# Patient Record
Sex: FEMALE | Race: WHITE | NOT HISPANIC OR LATINO | Employment: FULL TIME | URBAN - METROPOLITAN AREA
[De-identification: names, ages, dates, MRNs, and addresses within clinical notes are randomized per-mention and may not be internally consistent; named-entity substitution may affect disease eponyms.]

---

## 2018-08-08 ENCOUNTER — APPOINTMENT (OUTPATIENT)
Dept: CT IMAGING | Facility: HOSPITAL | Age: 28
End: 2018-08-08
Attending: EMERGENCY MEDICINE

## 2018-08-08 ENCOUNTER — HOSPITAL ENCOUNTER (EMERGENCY)
Facility: HOSPITAL | Age: 28
Discharge: HOME | End: 2018-08-08
Attending: EMERGENCY MEDICINE | Admitting: EMERGENCY MEDICINE

## 2018-08-08 ENCOUNTER — APPOINTMENT (OUTPATIENT)
Dept: GENERAL RADIOLOGY | Facility: HOSPITAL | Age: 28
End: 2018-08-08

## 2018-08-08 VITALS
RESPIRATION RATE: 18 BRPM | SYSTOLIC BLOOD PRESSURE: 118 MMHG | OXYGEN SATURATION: 99 % | HEIGHT: 65 IN | WEIGHT: 175 LBS | HEART RATE: 56 BPM | DIASTOLIC BLOOD PRESSURE: 74 MMHG | TEMPERATURE: 98.9 F | BODY MASS INDEX: 29.16 KG/M2

## 2018-08-08 DIAGNOSIS — K63.89 EPIPLOIC APPENDAGITIS: Primary | ICD-10-CM

## 2018-08-08 LAB
ALBUMIN SERPL-MCNC: 4.6 G/DL (ref 3.5–5.2)
ALBUMIN/GLOB SERPL: 1.4 G/DL
ALP SERPL-CCNC: 58 U/L (ref 40–129)
ALT SERPL W P-5'-P-CCNC: 22 U/L (ref 5–33)
ANION GAP SERPL CALCULATED.3IONS-SCNC: 13.7 MMOL/L
AST SERPL-CCNC: 23 U/L (ref 5–32)
BACTERIA UR QL AUTO: ABNORMAL /HPF
BASOPHILS # BLD AUTO: 0.03 10*3/MM3 (ref 0–0.2)
BASOPHILS NFR BLD AUTO: 0.5 % (ref 0–2)
BILIRUB SERPL-MCNC: 0.8 MG/DL (ref 0.2–1.2)
BILIRUB UR QL STRIP: NEGATIVE
BUN BLD-MCNC: 12 MG/DL (ref 6–20)
BUN/CREAT SERPL: 16.2 (ref 7–25)
CALCIUM SPEC-SCNC: 9.3 MG/DL (ref 8.6–10.5)
CHLORIDE SERPL-SCNC: 102 MMOL/L (ref 98–107)
CLARITY UR: CLEAR
CO2 SERPL-SCNC: 24.3 MMOL/L (ref 22–29)
COLOR UR: YELLOW
CREAT BLD-MCNC: 0.74 MG/DL (ref 0.57–1)
DEPRECATED RDW RBC AUTO: 40.9 FL (ref 37–54)
EOSINOPHIL # BLD AUTO: 0.13 10*3/MM3 (ref 0.1–0.3)
EOSINOPHIL NFR BLD AUTO: 2.2 % (ref 0–4)
ERYTHROCYTE [DISTWIDTH] IN BLOOD BY AUTOMATED COUNT: 11.9 % (ref 11.5–14.5)
GFR SERPL CREATININE-BSD FRML MDRD: 95 ML/MIN/1.73
GLOBULIN UR ELPH-MCNC: 3.3 GM/DL
GLUCOSE BLD-MCNC: 91 MG/DL (ref 65–99)
GLUCOSE UR STRIP-MCNC: NEGATIVE MG/DL
HCG SERPL QL: NEGATIVE
HCT VFR BLD AUTO: 40.5 % (ref 37–47)
HGB BLD-MCNC: 13.7 G/DL (ref 12–16)
HGB UR QL STRIP.AUTO: NEGATIVE
HYALINE CASTS UR QL AUTO: ABNORMAL /LPF
IMM GRANULOCYTES # BLD: 0.01 10*3/MM3 (ref 0–0.03)
IMM GRANULOCYTES NFR BLD: 0.2 % (ref 0–0.5)
KETONES UR QL STRIP: NEGATIVE
LEUKOCYTE ESTERASE UR QL STRIP.AUTO: ABNORMAL
LIPASE SERPL-CCNC: 25 U/L (ref 13–60)
LYMPHOCYTES # BLD AUTO: 1.56 10*3/MM3 (ref 0.6–4.8)
LYMPHOCYTES NFR BLD AUTO: 25.8 % (ref 20–45)
MCH RBC QN AUTO: 31.5 PG (ref 27–31)
MCHC RBC AUTO-ENTMCNC: 33.8 G/DL (ref 31–37)
MCV RBC AUTO: 93.1 FL (ref 81–99)
MONOCYTES # BLD AUTO: 0.68 10*3/MM3 (ref 0–1)
MONOCYTES NFR BLD AUTO: 11.3 % (ref 3–8)
NEUTROPHILS # BLD AUTO: 3.63 10*3/MM3 (ref 1.5–8.3)
NEUTROPHILS NFR BLD AUTO: 60 % (ref 45–70)
NITRITE UR QL STRIP: NEGATIVE
NRBC BLD MANUAL-RTO: 0 /100 WBC (ref 0–0)
PH UR STRIP.AUTO: 7 [PH] (ref 4.5–8)
PLATELET # BLD AUTO: 268 10*3/MM3 (ref 140–500)
PMV BLD AUTO: 10.6 FL (ref 7.4–10.4)
POTASSIUM BLD-SCNC: 3.8 MMOL/L (ref 3.5–5.2)
PROT SERPL-MCNC: 7.9 G/DL (ref 6–8.5)
PROT UR QL STRIP: NEGATIVE
RBC # BLD AUTO: 4.35 10*6/MM3 (ref 4.2–5.4)
RBC # UR: ABNORMAL /HPF
REF LAB TEST METHOD: ABNORMAL
SODIUM BLD-SCNC: 140 MMOL/L (ref 136–145)
SP GR UR STRIP: 1.01 (ref 1–1.03)
SQUAMOUS #/AREA URNS HPF: ABNORMAL /HPF
UROBILINOGEN UR QL STRIP: ABNORMAL
WBC NRBC COR # BLD: 6.04 10*3/MM3 (ref 4.8–10.8)
WBC UR QL AUTO: ABNORMAL /HPF

## 2018-08-08 PROCEDURE — 96374 THER/PROPH/DIAG INJ IV PUSH: CPT

## 2018-08-08 PROCEDURE — 0 IOPAMIDOL PER 1 ML: Performed by: EMERGENCY MEDICINE

## 2018-08-08 PROCEDURE — 71046 X-RAY EXAM CHEST 2 VIEWS: CPT

## 2018-08-08 PROCEDURE — 85025 COMPLETE CBC W/AUTO DIFF WBC: CPT | Performed by: EMERGENCY MEDICINE

## 2018-08-08 PROCEDURE — 74177 CT ABD & PELVIS W/CONTRAST: CPT

## 2018-08-08 PROCEDURE — 84703 CHORIONIC GONADOTROPIN ASSAY: CPT | Performed by: EMERGENCY MEDICINE

## 2018-08-08 PROCEDURE — 80053 COMPREHEN METABOLIC PANEL: CPT | Performed by: EMERGENCY MEDICINE

## 2018-08-08 PROCEDURE — 25010000002 KETOROLAC TROMETHAMINE PER 15 MG: Performed by: EMERGENCY MEDICINE

## 2018-08-08 PROCEDURE — 99284 EMERGENCY DEPT VISIT MOD MDM: CPT | Performed by: EMERGENCY MEDICINE

## 2018-08-08 PROCEDURE — 99283 EMERGENCY DEPT VISIT LOW MDM: CPT

## 2018-08-08 PROCEDURE — 83690 ASSAY OF LIPASE: CPT | Performed by: EMERGENCY MEDICINE

## 2018-08-08 PROCEDURE — 81001 URINALYSIS AUTO W/SCOPE: CPT | Performed by: EMERGENCY MEDICINE

## 2018-08-08 RX ORDER — KETOROLAC TROMETHAMINE 30 MG/ML
30 INJECTION, SOLUTION INTRAMUSCULAR; INTRAVENOUS ONCE
Status: COMPLETED | OUTPATIENT
Start: 2018-08-08 | End: 2018-08-08

## 2018-08-08 RX ORDER — PHENDIMETRAZINE TARTRATE 35 MG/1
TABLET ORAL
COMMUNITY
End: 2019-10-15 | Stop reason: ALTCHOICE

## 2018-08-08 RX ORDER — PHENTERMINE HYDROCHLORIDE 30 MG/1
30 CAPSULE ORAL EVERY MORNING
COMMUNITY
End: 2021-03-11

## 2018-08-08 RX ORDER — IBUPROFEN 800 MG/1
800 TABLET ORAL
Qty: 30 TABLET | Refills: 0 | Status: SHIPPED | OUTPATIENT
Start: 2018-08-08 | End: 2018-08-18

## 2018-08-08 RX ORDER — SODIUM CHLORIDE 0.9 % (FLUSH) 0.9 %
10 SYRINGE (ML) INJECTION AS NEEDED
Status: DISCONTINUED | OUTPATIENT
Start: 2018-08-08 | End: 2018-08-08 | Stop reason: HOSPADM

## 2018-08-08 RX ADMIN — KETOROLAC TROMETHAMINE 30 MG: 30 INJECTION, SOLUTION INTRAMUSCULAR at 10:33

## 2018-08-08 RX ADMIN — IOPAMIDOL 100 ML: 755 INJECTION, SOLUTION INTRAVENOUS at 13:43

## 2018-08-08 NOTE — ED PROVIDER NOTES
Subjective   History of Present Illness  History of Present Illness    Chief complaint: Right side pain     Location: Right lateral costal margin    Quality/Severity:  Described as sharp and moderate    Timing/Duration: Insidious onset approximately 24 hours ago    Modifying Factors: Certain movements and deep breathing causes pain    Associated Symptoms: None    Narrative: The patient is a normally healthy, 27-year-old, white female who presents as noted above.    Review of Systems   Constitutional: Negative for activity change, appetite change, fatigue and fever.   HENT: Negative for congestion.    Respiratory: Negative for cough, shortness of breath and wheezing.    Cardiovascular: Positive for chest pain. Negative for palpitations and leg swelling.   Gastrointestinal: Positive for abdominal pain (Right upper quadrant). Negative for diarrhea, nausea and vomiting.   Endocrine: Negative for polydipsia.   Genitourinary: Negative for difficulty urinating, dysuria, flank pain, frequency and urgency.   Musculoskeletal: Negative for back pain.   Skin: Negative for rash.   Neurological: Negative for dizziness, weakness and headaches.   Psychiatric/Behavioral: Negative for confusion.   All other systems reviewed and are negative.      History reviewed. No pertinent past medical history.    No Known Allergies    Past Surgical History:   Procedure Laterality Date   • THYROID SURGERY         History reviewed. No pertinent family history.    Social History     Social History   • Marital status: Unknown     Social History Main Topics   • Smoking status: Never Smoker   • Alcohol use Yes      Comment: occasionally   • Drug use: Unknown     Other Topics Concern   • Not on file           Objective   Physical Exam   Constitutional: She is oriented to person, place, and time. She appears well-developed and well-nourished.   HENT:   Head: Normocephalic and atraumatic.   Mouth/Throat: Oropharynx is clear and moist.   Eyes: Pupils are  equal, round, and reactive to light. Conjunctivae and EOM are normal.   Neck: Normal range of motion. Neck supple. No thyromegaly present.   Cardiovascular: Normal rate, regular rhythm and normal heart sounds.    No murmur heard.  Pulmonary/Chest: Effort normal and breath sounds normal. No respiratory distress. She has no wheezes. She has no rales. She exhibits tenderness (palpation of the right anterolateral costal margin with mild tenderness.).   Abdominal: Soft. Bowel sounds are normal. She exhibits no distension. There is tenderness (right upper quadrant).   Musculoskeletal: Normal range of motion. She exhibits no edema or tenderness.   Lymphadenopathy:     She has no cervical adenopathy.   Neurological: She is alert and oriented to person, place, and time.   Skin: Skin is warm and dry. No rash noted.   Psychiatric: She has a normal mood and affect. Her behavior is normal.   Nursing note and vitals reviewed.      Procedures  Xr Chest 2 View    Result Date: 8/8/2018  XR CHEST 2 VW-: 8/8/2018 11:45 AM  INDICATION:  Right-sided chest pain for 2 days  COMPARISON:  None available.  FINDINGS: PA and lateral views of the chest.  Heart and mediastinal contours are normal.  The lungs are clear.  No pneumothorax or pleural effusion.      Normal chest radiograph.  This report was finalized on 8/8/2018 12:16 PM by Dr. Arnol Swenson MD.      Ct Abdomen Pelvis With Contrast    Result Date: 8/8/2018  CT ABDOMEN PELVIS W CONTRAST-: 8/8/2018 1:35 PM  INDICATION: Right lower chest/abdominal pain.  TECHNIQUE: CT of the abdomen and pelvis with contrast. Coronal and sagittal reconstructions were obtained.  Radiation dose reduction techniques included automated exposure control or exposure modulation based on body size. Radiation audit for number of CT and nuclear cardiology exams performed in the last year: 0.   COMPARISON: None available.  FINDINGS: ABDOMEN: There is a focal area of inflammation surrounding some fat adjacent to the  hepatic flexure of the colon. I suspect this represents an epiploic appendicitis. The adjacent liver and: Do not show any significant pathologic.  The pancreas, spleen, and adrenal glands are normal. There is benign cyst off the superior pole left kidney.. The gallbladder is not distended.  The bowel is not dilated.  The appendix is normal.       PELVIS: There is a small volume of free fluid in the pelvis which is likely physiologic. The uterus and ovaries are within normal limits. The patient has an IUD.  No enlarged pelvic or inguinal lymph nodes.  No acute osseous abnormalities.      Small area of inflammation within the fat adjacent to the hepatic flexure of the colon.  This is likely secondary to an epiploic appendicitis. No complicating features are identified.     This report was finalized on 8/8/2018 1:50 PM by Dr. Arnol Swenson MD.           Final diagnoses:   Epiploic appendagitis         ED Course  ED Course as of Aug 08 1413   Wed Aug 08, 2018   1241 All pertinent findings discussed with patient as were the diagnosis of chest wall pain, treatment plan, expectations and warnings.  [ML]   1311 Patient was initially discharged after a full discussion of her current presentation.  However the patient seemed convinced that she was having a more significant problem and possibly an appendicitis.  The patient went so far as to call her mother who proceeded to explain to me that she had an atypical appendicitis at age 24.  Although based on current findings and workup it was felt that there was low risk of any significant thoracic or abdominal pathology.  However, because of the patient's extreme anxiety over her current pain a CT abdomen/pelvis will be obtained.  [ML]   1408 Final report on the CT abdomen/pelvis did show an area of inflammation in the hepatic flexure consistent with epiploic appendage orifice.  This diagnosis was discussed in detail with the patient along with the treatment plan, expectations  and warnings.  [ML]      ED Course User Index  [ML] Franklyn eBllo MD                  MDM  Number of Diagnoses or Management Options  Epiploic appendagitis: new and does not require workup     Amount and/or Complexity of Data Reviewed  Clinical lab tests: ordered and reviewed  Tests in the radiology section of CPT®: ordered and reviewed  Independent visualization of images, tracings, or specimens: yes    Risk of Complications, Morbidity, and/or Mortality  Presenting problems: high  Diagnostic procedures: high  Management options: moderate    Patient Progress  Patient progress: stable  My differential diagnosis for abdominal pain includes but is not limited to:  Gastritis, gastroenteritis, peptic ulcer disease, GERD, esophageal perforation, acute appendicitis, mesenteric adenitis, Meckel’s diverticulum, epiploic appendagitis, diverticulitis, colon cancer, ulcerative colitis, Crohn’s disease, intussusception, small bowel obstruction, adhesions, ischemic bowel, perforated viscus, ileus, obstipation, biliary colic, cholecystitis, cholelithiasis, Jerrod-Aneudy Get, hepatitis, pancreatitis, common bile duct obstruction, cholangitis, bile leak, splenic trauma, splenic rupture, splenic infarction, splenic abscess, abdominal abscess, ascites, spontaneous bacterial peritonitis, hernia, UTI, cystitis, prostatitis, ureterolithiasis, urinary obstruction, ovarian cyst, torsion, pregnancy, ectopic pregnancy, PID, pelvic abscess, mittelschmerz, endometriosis, AAA, myocardial infarction, pneumonia, cancer, porphyria, DKA, medications, sickle cell, viral syndrome, zoster    Labs this visit  Lab Results (last 24 hours)     Procedure Component Value Units Date/Time    Urinalysis With Microscopic If Indicated (No Culture) - Urine, Clean Catch [[id number]]  (Abnormal) Collected:  08/08/18 1027    Specimen:  Urine from Urine, Clean Catch Updated:  08/08/18 1057     Color, UA Yellow     Appearance, UA Clear     pH, UA 7.0      Specific Gravity, UA 1.015     Glucose, UA Negative     Ketones, UA Negative     Bilirubin, UA Negative     Blood, UA Negative     Protein, UA Negative     Leuk Esterase, UA Trace (A)     Nitrite, UA Negative     Urobilinogen, UA 0.2 E.U./dL    Urinalysis, Microscopic Only - Urine, Clean Catch [[id number]]  (Abnormal) Collected:  08/08/18 1027    Specimen:  Urine from Urine, Clean Catch Updated:  08/08/18 1106     RBC, UA None Seen /HPF      WBC, UA 3-5 (A) /HPF      Bacteria, UA None Seen /HPF      Squamous Epithelial Cells, UA 0-2 /HPF      Hyaline Casts, UA None Seen /LPF      Methodology Manual Light Microscopy    CBC & Differential [[id number]] Collected:  08/08/18 1032    Specimen:  Blood Updated:  08/08/18 1049    Narrative:       The following orders were created for panel order CBC & Differential.  Procedure                               Abnormality         Status                     ---------                               -----------         ------                     CBC Auto Differential[[id number]]        Abnormal            Final result                 Please view results for these tests on the individual orders.    Comprehensive Metabolic Panel [[id number]] Collected:  08/08/18 1032    Specimen:  Blood Updated:  08/08/18 1100     Glucose 91 mg/dL      BUN 12 mg/dL      Creatinine 0.74 mg/dL      Sodium 140 mmol/L      Potassium 3.8 mmol/L      Chloride 102 mmol/L      CO2 24.3 mmol/L      Calcium 9.3 mg/dL      Total Protein 7.9 g/dL      Albumin 4.60 g/dL      ALT (SGPT) 22 U/L      AST (SGOT) 23 U/L      Alkaline Phosphatase 58 U/L      Total Bilirubin 0.8 mg/dL      eGFR Non African Amer 95 mL/min/1.73      Globulin 3.3 gm/dL      A/G Ratio 1.4 g/dL      BUN/Creatinine Ratio 16.2     Anion Gap 13.7 mmol/L     Lipase [[id number]]  (Normal) Collected:  08/08/18 1032    Specimen:  Blood Updated:  08/08/18 1100     Lipase 25 U/L     hCG, Serum, Qualitative [[id number]]  (Normal) Collected:  08/08/18 1032     Specimen:  Blood Updated:  08/08/18 1055     HCG Qualitative Negative    CBC Auto Differential [[id number]]  (Abnormal) Collected:  08/08/18 1032    Specimen:  Blood Updated:  08/08/18 1049     WBC 6.04 10*3/mm3      RBC 4.35 10*6/mm3      Hemoglobin 13.7 g/dL      Hematocrit 40.5 %      MCV 93.1 fL      MCH 31.5 (H) pg      MCHC 33.8 g/dL      RDW 11.9 %      RDW-SD 40.9 fl      MPV 10.6 (H) fL      Platelets 268 10*3/mm3      Neutrophil % 60.0 %      Lymphocyte % 25.8 %      Monocyte % 11.3 (H) %      Eosinophil % 2.2 %      Basophil % 0.5 %      Immature Grans % 0.2 %      Neutrophils, Absolute 3.63 10*3/mm3      Lymphocytes, Absolute 1.56 10*3/mm3      Monocytes, Absolute 0.68 10*3/mm3      Eosinophils, Absolute 0.13 10*3/mm3      Basophils, Absolute 0.03 10*3/mm3      Immature Grans, Absolute 0.01 10*3/mm3      nRBC 0.0 /100 WBC         Prescribed on discharge             Medication List      New Prescriptions    ibuprofen 800 MG tablet  Commonly known as:  ADVIL,MOTRIN  Take 1 tablet by mouth 3 (Three) Times a Day With Meals for 30 doses.          All lab results, imaging results and other tests were reviewed by Franklyn Bello MD and unless otherwise specified were found to be unremarkable.        Final diagnoses:   Epiploic appendagitis            Franklyn Bello MD  08/08/18 [id number]

## 2018-08-08 NOTE — DISCHARGE INSTRUCTIONS
Return to the emergency department for increasing pain, fever, persistent vomiting, profuse diarrhea or any bleeding.  Diet and activity as tolerated.

## 2018-08-08 NOTE — ED NOTES
Upon discharging pt, she started crying and said she was not happy that she was being discharged. Spoke with pt for about 15mins about this and told her I will have the doctor come talk to her again. Pt was thankful for that.      Kevin Win, SERVANDO  08/08/18 [id number]

## 2020-02-26 ENCOUNTER — OFFICE VISIT (OUTPATIENT)
Dept: RETAIL CLINIC | Facility: CLINIC | Age: 30
End: 2020-02-26

## 2020-02-26 VITALS
HEART RATE: 78 BPM | TEMPERATURE: 98.5 F | DIASTOLIC BLOOD PRESSURE: 82 MMHG | OXYGEN SATURATION: 98 % | SYSTOLIC BLOOD PRESSURE: 132 MMHG | RESPIRATION RATE: 18 BRPM

## 2020-02-26 DIAGNOSIS — J34.89 TENDERNESS OVER MAXILLARY SINUS: ICD-10-CM

## 2020-02-26 DIAGNOSIS — G44.209 TENSION HEADACHE: Primary | ICD-10-CM

## 2020-02-26 PROCEDURE — 99213 OFFICE O/P EST LOW 20 MIN: CPT | Performed by: NURSE PRACTITIONER

## 2020-02-26 RX ORDER — GUAIFENESIN 600 MG/1
1200 TABLET, EXTENDED RELEASE ORAL 2 TIMES DAILY
Qty: 8 TABLET | Refills: 0 | Status: SHIPPED | OUTPATIENT
Start: 2020-02-26 | End: 2020-02-28

## 2020-02-26 RX ORDER — METHYLPREDNISOLONE 4 MG/1
TABLET ORAL
Qty: 21 TABLET | Refills: 0 | Status: SHIPPED | OUTPATIENT
Start: 2020-02-26 | End: 2021-03-11

## 2020-02-26 NOTE — PATIENT INSTRUCTIONS
Tension Headache, Adult  A tension headache is a feeling of pain, pressure, or aching in the head that is often felt over the front and sides of the head. The pain can be dull, or it can feel tight (constricting). There are two types of tension headache:  · Episodic tension headache. This is when the headaches happen fewer than 15 days a month.  · Chronic tension headache. This is when the headaches happen more than 15 days a month during a 3-month period.  A tension headache can last from 30 minutes to several days. It is the most common kind of headache. Tension headaches are not normally associated with nausea or vomiting, and they do not get worse with physical activity.  What are the causes?  The exact cause of this condition is not known. Tension headaches are often triggered by stress, anxiety, or depression. Other triggers include:  · Alcohol.  · Too much caffeine or caffeine withdrawal.  · Respiratory infections, such as colds, flu, or sinus infections.  · Dental problems or teeth clenching.  · Tiredness (fatigue).  · Holding your head and neck in the same position for a long period of time, such as while using a computer.  · Smoking.  · Arthritis of the neck.  What are the signs or symptoms?  Symptoms of this condition include:  · A feeling of pressure or tightness around the head.  · Dull, aching head pain.  · Pain over the front and sides of the head.  · Tenderness in the muscles of the head, neck, and shoulders.  How is this diagnosed?  This condition may be diagnosed based on your symptoms, your medical history, and a physical exam. If your symptoms are severe or unusual, you may have imaging tests, such as a CT scan or an MRI of your head. Your vision may also be checked.  How is this treated?  This condition may be treated with lifestyle changes and with medicines that help relieve symptoms.  Follow these instructions at home:  Managing pain  · Take over-the-counter and prescription medicines only as  told by your health care provider.  · When you have a headache, lie down in a dark, quiet room.  · If directed, apply ice to the head and neck:  ? Put ice in a plastic bag.  ? Place a towel between your skin and the bag.  ? Leave the ice on for 20 minutes, 2-3 times a day.  · If directed, apply heat to the back of your neck as often as told by your health care provider. Use the heat source that your health care provider recommends, such as a moist heat pack or a heating pad.  ? Place a towel between your skin and the heat source.  ? Leave the heat on for 20-30 minutes.  ? Remove the heat if your skin turns bright red. This is especially important if you are unable to feel pain, heat, or cold. You may have a greater risk of getting burned.  Eating and drinking  · Eat meals on a regular schedule.  · Limit alcohol intake to no more than 1 drink a day for nonpregnant women and 2 drinks a day for men. One drink equals 12 oz of beer, 5 oz of wine, or 1½ oz of hard liquor.  · Drink enough fluid to keep your urine pale yellow.  · Decrease your caffeine intake, or stop using caffeine.  Lifestyle  · Get 7-9 hours of sleep each night, or get the amount of sleep recommended by your health care provider.  · At bedtime, remove all electronic devices from your room. Electronic devices include computers, phones, and tablets.  · Find ways to manage your stress. Some things that can help relieve stress include:  ? Exercise.  ? Deep breathing exercises.  ? Yoga.  ? Listening to music.  ? Positive mental imagery.  · Try to sit up straight and avoid tensing your muscles.  · Do not use any products that contain nicotine or tobacco, such as cigarettes and e-cigarettes. If you need help quitting, ask your health care provider.  General instructions    · Keep all follow-up visits as told by your health care provider. This is important.  · Avoid any headache triggers. Keep a headache journal to help find out what may trigger your headaches.  For example, write down:  ? What you eat and drink.  ? How much sleep you get.  ? Any change to your diet or medicines.  Contact a health care provider if:  · Your headache does not get better.  · Your headache comes back.  · You are sensitive to sounds, light, or smells because of a headache.  · You have nausea or you vomit.  · Your stomach hurts.  Get help right away if:  · You suddenly develop a very severe headache along with any of the following:  ? A stiff neck.  ? Nausea and vomiting.  ? Confusion.  ? Weakness.  ? Double vision or loss of vision.  ? Shortness of breath.  ? Rash.  ? Unusual sleepiness.  ? Fever.  ? Trouble speaking.  ? Pain in your eyes or ears.  ? Trouble walking or balancing.  ? Feeling faint or passing out.  Summary  · A tension headache is a feeling of pain, pressure, or aching in the head that is often felt over the front and sides of the head.  · A tension headache can last from 30 minutes to several days. It is the most common kind of headache.  · This condition may be diagnosed based on your symptoms, your medical history, and a physical exam.  · This condition may be treated with lifestyle changes and with medicines that help relieve symptoms.  This information is not intended to replace advice given to you by your health care provider. Make sure you discuss any questions you have with your health care provider.  Document Released: 12/18/2006 Document Revised: 03/30/2018 Document Reviewed: 03/30/2018  [employer] Patient Education © 2020 [employer]

## 2020-07-03 ENCOUNTER — TELEPHONE (OUTPATIENT)
Dept: URGENT CARE | Facility: CLINIC | Age: 30
End: 2020-07-03

## 2020-07-03 DIAGNOSIS — B37.31 VAGINAL YEAST INFECTION: Primary | ICD-10-CM

## 2020-07-03 RX ORDER — FLUCONAZOLE 150 MG/1
TABLET ORAL
Qty: 2 TABLET | Refills: 0 | Status: SHIPPED | OUTPATIENT
Start: 2020-07-03 | End: 2021-03-11

## 2021-03-11 ENCOUNTER — OFFICE VISIT (OUTPATIENT)
Dept: OBSTETRICS AND GYNECOLOGY | Facility: CLINIC | Age: 31
End: 2021-03-11

## 2021-03-11 VITALS
SYSTOLIC BLOOD PRESSURE: 118 MMHG | WEIGHT: 168.2 LBS | BODY MASS INDEX: 28.02 KG/M2 | DIASTOLIC BLOOD PRESSURE: 80 MMHG | HEIGHT: 65 IN

## 2021-03-11 DIAGNOSIS — Z01.419 PAP SMEAR, LOW-RISK: ICD-10-CM

## 2021-03-11 DIAGNOSIS — Z01.419 ROUTINE GYNECOLOGICAL EXAMINATION: ICD-10-CM

## 2021-03-11 DIAGNOSIS — Z30.431 IUD CHECK UP: ICD-10-CM

## 2021-03-11 DIAGNOSIS — Z01.419 WELL WOMAN EXAM WITH ROUTINE GYNECOLOGICAL EXAM: Primary | ICD-10-CM

## 2021-03-11 LAB
B-HCG UR QL: NEGATIVE
BILIRUB BLD-MCNC: NEGATIVE MG/DL
CLARITY, POC: CLEAR
COLOR UR: YELLOW
GLUCOSE UR STRIP-MCNC: NEGATIVE MG/DL
INTERNAL NEGATIVE CONTROL: NEGATIVE
INTERNAL POSITIVE CONTROL: POSITIVE
KETONES UR QL: NEGATIVE
LEUKOCYTE EST, POC: NEGATIVE
Lab: 55
NITRITE UR-MCNC: NEGATIVE MG/ML
PH UR: 5 [PH] (ref 5–8)
PROT UR STRIP-MCNC: NEGATIVE MG/DL
RBC # UR STRIP: NEGATIVE /UL
SP GR UR: 1 (ref 1–1.03)
UROBILINOGEN UR QL: NORMAL

## 2021-03-11 PROCEDURE — 81025 URINE PREGNANCY TEST: CPT | Performed by: NURSE PRACTITIONER

## 2021-03-11 PROCEDURE — 99395 PREV VISIT EST AGE 18-39: CPT | Performed by: NURSE PRACTITIONER

## 2021-03-11 PROCEDURE — 81002 URINALYSIS NONAUTO W/O SCOPE: CPT | Performed by: NURSE PRACTITIONER

## 2021-03-11 RX ORDER — MULTIVIT WITH MINERALS/LUTEIN
250 TABLET ORAL DAILY
COMMUNITY
End: 2022-03-14

## 2021-03-11 RX ORDER — CALCIUM CARBONATE/VITAMIN D3 500-10/5ML
LIQUID (ML) ORAL
COMMUNITY

## 2021-03-11 NOTE — PROGRESS NOTES
GYN Annual Exam     Chief Complaint   Patient presents with   • Gynecologic Exam       Suha Mcconnell is a 30 y.o. female who presents for annual well woman exam. She is a new patient. She is sexually active. Currently using ParaGuard for contraception. She is happy with her contraception: Yes. Periods are regular every 28-30 days, lasting 5 days. Dysmenorrhea:moderate, occurring a varying times of cycle.. Cyclic symptoms include heavy bleeding, passing of clots, and cramping. She does have Naproxen for PRN use during cycles. She was on hormonal contraception and did not like how it made her feel so she changed to Para Guard. Para Guard was placed in . She does not do string checks.  No intermenstrual bleeding, spotting, or discharge.  Performing SBE: does do occas    She does not have any children. Menarche age 12.   HPI    OB History        0    Para   0    Term   0       0    AB   0    Living   0       SAB   0    TAB   0    Ectopic   0    Molar   0    Multiple   0    Live Births   0                Last Pap :   History of abnormal Pap smear: no  Family history of uterine, colon or ovarian cancer: no  Family history of breast cancer: no  History of abnormal mammogram: no  Gardasil Vaccine: did not get     Past Medical History:   Diagnosis Date   • Abdominal cramps    • Weight loss        Past Surgical History:   Procedure Laterality Date   • TONSILLECTOMY           Current Outpatient Medications:   •  albuterol sulfate HFA (Ventolin HFA) 108 (90 Base) MCG/ACT inhaler, INHALE 2 PUFFS PO Q 6 H PRN, Disp: , Rfl:   •  Cyanocobalamin (VITAMIN B 12 PO), Take  by mouth., Disp: , Rfl:   •  Fexofenadine HCl (ALLERGY 24-HR PO), Take  by mouth., Disp: , Rfl:   •  magnesium oxide (MAGOX) 400 (241.3 Mg) MG tablet tablet, Take 400 mg by mouth Daily., Disp: , Rfl:   •  naproxen (EC NAPROSYN) 500 MG EC tablet, Take 500 mg by mouth., Disp: , Rfl:   •  Omega-3 Fatty Acids (FISH OIL) 1000 MG capsule capsule, Take   "by mouth Daily., Disp: , Rfl:   •  PARAGARD INTRAUTERINE COPPER IU, by Intrauterine route., Disp: , Rfl:   •  vitamin C (ASCORBIC ACID) 250 MG tablet, Take 250 mg by mouth Daily., Disp: , Rfl:   •  vitamin D (ERGOCALCIFEROL) 1.25 MG (74061 UT) capsule capsule, Take 50,000 Units by mouth 1 (One) Time Per Week., Disp: , Rfl:   •  Zinc 30 MG capsule, Take  by mouth., Disp: , Rfl:     No Known Allergies    Social History     Tobacco Use   • Smoking status: Never Smoker   • Smokeless tobacco: Never Used   Substance Use Topics   • Alcohol use: Yes     Comment: occasionally   • Drug use: Never       Family History   Problem Relation Age of Onset   • No Known Problems Mother    • No Known Problems Father    • Breast cancer Neg Hx    • Ovarian cancer Neg Hx    • Colon cancer Neg Hx    • Uterine cancer Neg Hx        Review of Systems   Constitutional: Negative.    Respiratory: Negative.    Cardiovascular: Negative.    Gastrointestinal: Negative.    Genitourinary: Negative.    Musculoskeletal: Negative.    Skin: Negative.    Neurological: Negative.    Psychiatric/Behavioral: Negative.        /80   Ht 165.1 cm (65\")   Wt 76.3 kg (168 lb 3.2 oz)   LMP 02/08/2021   Breastfeeding No   BMI 27.99 kg/m²     Physical Exam  Vitals reviewed.   Constitutional:       Appearance: She is well-developed.   Neck:      Thyroid: No thyromegaly.   Cardiovascular:      Rate and Rhythm: Normal rate and regular rhythm.   Pulmonary:      Effort: Pulmonary effort is normal.      Breath sounds: Normal breath sounds.   Chest:      Breasts:         Right: No inverted nipple, mass, nipple discharge, skin change or tenderness.         Left: No inverted nipple, mass, nipple discharge, skin change or tenderness.   Abdominal:      General: Bowel sounds are normal.      Palpations: Abdomen is soft.   Genitourinary:     Exam position: Supine.      Labia:         Right: No rash, tenderness, lesion or injury.         Left: No rash, tenderness, lesion " or injury.       Vagina: No signs of injury and foreign body. No vaginal discharge, erythema, tenderness or bleeding.      Cervix: No cervical motion tenderness, discharge or friability.      Uterus: Not deviated, not enlarged, not fixed and not tender.       Adnexa:         Right: No mass, tenderness or fullness.          Left: No mass, tenderness or fullness.        Rectum: Normal.      Comments: IUD strings visualized  Musculoskeletal:         General: Normal range of motion.      Cervical back: Normal range of motion and neck supple.   Skin:     General: Skin is warm and dry.   Neurological:      General: No focal deficit present.      Mental Status: She is alert and oriented to person, place, and time.   Psychiatric:         Mood and Affect: Mood normal.         Behavior: Behavior normal.          Assessment     1. Well woman exam   2. IUD check  3. HPV vaccine need     Plan   1. Well woman exam: Pap collected Yes. Instructed on how to perform SBE. Recommend MVI daily.    2. Contraception: ParaGaurd IUD. Placed 2014. Strings visualized today.   3. STD: Enc condoms. Desires STD screen today- No.   4. Smoking status: non smoker  5.   Patient's Body mass index is 27.99 kg/m². BMI is above normal parameters. Recommendations include: exercise counseling and nutrition counseling.  6.   HPV vaccine need- Counseled on HPV vaccine     RTO SUNDEEP Carson  3/11/2021  13:12 EST

## 2021-03-17 PROBLEM — Z30.431 IUD CHECK UP: Status: ACTIVE | Noted: 2021-03-17

## 2021-03-17 PROBLEM — Z01.419 WELL WOMAN EXAM WITH ROUTINE GYNECOLOGICAL EXAM: Status: ACTIVE | Noted: 2021-03-17

## 2021-03-17 LAB
CONV .: ABNORMAL
CYTOLOGIST CVX/VAG CYTO: ABNORMAL
CYTOLOGY CVX/VAG DOC CYTO: ABNORMAL
CYTOLOGY CVX/VAG DOC THIN PREP: ABNORMAL
DX ICD CODE: ABNORMAL
DX ICD CODE: ABNORMAL
HIV 1 & 2 AB SER-IMP: ABNORMAL
HPV I/H RISK 4 DNA CVX QL PROBE+SIG AMP: NEGATIVE
OTHER STN SPEC: ABNORMAL
PATHOLOGIST CVX/VAG CYTO: ABNORMAL
STAT OF ADQ CVX/VAG CYTO-IMP: ABNORMAL

## 2021-09-13 ENCOUNTER — OFFICE VISIT (OUTPATIENT)
Dept: OBSTETRICS AND GYNECOLOGY | Facility: CLINIC | Age: 31
End: 2021-09-13

## 2021-09-13 VITALS — HEIGHT: 65 IN | BODY MASS INDEX: 28.29 KG/M2 | WEIGHT: 169.8 LBS

## 2021-09-13 DIAGNOSIS — Z87.42 HISTORY OF ABNORMAL CERVICAL PAPANICOLAOU SMEAR: Primary | ICD-10-CM

## 2021-09-13 PROBLEM — Z01.419 ROUTINE GYNECOLOGICAL EXAMINATION: Status: ACTIVE | Noted: 2021-09-13

## 2021-09-13 PROBLEM — Z01.419 PAP SMEAR, LOW-RISK: Status: ACTIVE | Noted: 2021-09-13

## 2021-09-13 PROCEDURE — 99214 OFFICE O/P EST MOD 30 MIN: CPT | Performed by: NURSE PRACTITIONER

## 2021-09-13 RX ORDER — HYDROXYCHLOROQUINE SULFATE 200 MG/1
200 TABLET, FILM COATED ORAL 2 TIMES DAILY
COMMUNITY
Start: 2021-08-01 | End: 2022-03-14

## 2021-09-13 NOTE — PROGRESS NOTES
"Subjective     Chief Complaint   Patient presents with   • Gynecologic Exam       Suha Mcconnell is a 30 y.o.  whose LMP is Patient's last menstrual period was 2021.. She presents today for an AE. She just completed an AE in 3/21. When questioned as to why she presents for an AE today when she just had one in March, she states, \"This is what I normally do, I pay for one out of my pocked and then 6 months later, I have an annual that I bill my insurance for.\" Engaged in a conversation with the patient regarding why she was wanting 2 annual exams per year. After asking several questions, it is revealed that she has a history of abnormal pap with her previous provider. She reports the provider wanted her to have a colpo. She implied that her previous provider was being very \"pushy\" regarding the procedure. She patient reports that she did not want to have to have the colpo as advised. In seeking another opinion, she presented to our office as a \"new patient\" and did not disclose the abnormal pap history so \"you would give me an unbiased opinion.\"     An in depth chart review was done with patient. Pap smear was neg in  and . She had a ASC-H/HPV neg in . Her pap smear this year was ASCUS/HPV neg.       HPI    HPI    The following portions of the patient's history were reviewed and updated as appropriate:vital signs, allergies, current medications, past medical history, past social history, past surgical history and problem list      Review of Systems     Review of Systems   Constitutional: Negative.    Respiratory: Negative.    Cardiovascular: Negative.    Gastrointestinal: Negative.    Genitourinary: Negative.    Musculoskeletal: Negative.    Skin: Negative.    Neurological: Negative.    Psychiatric/Behavioral: Negative.        Objective      Ht 165.1 cm (65\")   Wt 77 kg (169 lb 12.8 oz)   LMP 2021   Breastfeeding No   BMI 28.26 kg/m²     Physical Exam    Physical Exam  Vitals and " "nursing note reviewed.   Constitutional:       Appearance: Normal appearance.   Abdominal:      Palpations: Abdomen is soft.   Skin:     General: Skin is warm and dry.   Neurological:      General: No focal deficit present.      Mental Status: She is alert and oriented to person, place, and time.   Psychiatric:         Mood and Affect: Mood is anxious. Affect is tearful.         Lab Review   Labs:Previous labs from [facility]   No data reviewed    Assessment  Diagnoses and all orders for this visit:    1. History of abnormal cervical Papanicolaou smear (Primary)  -     POC Urinalysis Dipstick  -     POC Pregnancy, Urine  -     Cancel: IGP, Rfx Aptima HPV ASCU        Additional Assessment:   1. Abnormal pap     Plan     1. Abnormal pap- Had a lengthy disc regarding her abnormal pap history and the need for colpo. Disc that withholding information during a health care visit did not provide her with an unbiased opinion, it provided her with an incorrect opinion and follow up which could ultimately impact her negative health. Disc the importance of colpo and appropriate follow up for abnormal pap smears. In discussion of colpo she starts crying and saying, \"I dont have time for this, I dont want to be cut on.\" Again disc what the procedure for colpo was. She agrees to no repeat pap today and to schedule a colpo. Disc that 1 AE per year is sufficient care.     F/U for colpo     I spent 34 minutes caring for Suha on this date of service. This time includes time spent by me in the following activities: reviewing tests, obtaining and/or reviewing a separately obtained history, counseling and educating the patient/family/caregiver, documenting information in the medical record, independently interpreting results and communicating that information with the patient/family/caregiver and care coordination    Noreen Taylor, APRN  9/13/2021        "

## 2021-10-11 ENCOUNTER — OFFICE VISIT (OUTPATIENT)
Dept: OBSTETRICS AND GYNECOLOGY | Facility: CLINIC | Age: 31
End: 2021-10-11

## 2021-10-11 VITALS
WEIGHT: 171 LBS | SYSTOLIC BLOOD PRESSURE: 118 MMHG | HEIGHT: 65 IN | DIASTOLIC BLOOD PRESSURE: 72 MMHG | BODY MASS INDEX: 28.49 KG/M2

## 2021-10-11 DIAGNOSIS — Z87.42 HISTORY OF ABNORMAL CERVICAL PAPANICOLAOU SMEAR: ICD-10-CM

## 2021-10-11 DIAGNOSIS — Z13.89 SCREENING FOR GENITOURINARY CONDITION: Primary | ICD-10-CM

## 2021-10-11 PROBLEM — Z01.419 WELL WOMAN EXAM WITH ROUTINE GYNECOLOGICAL EXAM: Status: RESOLVED | Noted: 2021-03-17 | Resolved: 2021-10-11

## 2021-10-11 PROBLEM — Z01.419 ROUTINE GYNECOLOGICAL EXAMINATION: Status: RESOLVED | Noted: 2021-09-13 | Resolved: 2021-10-11

## 2021-10-11 LAB
B-HCG UR QL: NEGATIVE
INTERNAL NEGATIVE CONTROL: NEGATIVE
INTERNAL POSITIVE CONTROL: POSITIVE
Lab: NORMAL

## 2021-10-11 PROCEDURE — 99213 OFFICE O/P EST LOW 20 MIN: CPT | Performed by: OBSTETRICS & GYNECOLOGY

## 2021-10-11 PROCEDURE — 81025 URINE PREGNANCY TEST: CPT | Performed by: OBSTETRICS & GYNECOLOGY

## 2021-10-11 RX ORDER — LANOLIN ALCOHOL/MO/W.PET/CERES
CREAM (GRAM) TOPICAL SEE ADMIN INSTRUCTIONS
COMMUNITY

## 2021-10-11 RX ORDER — PHENTERMINE HYDROCHLORIDE 37.5 MG/1
CAPSULE ORAL EVERY 24 HOURS
COMMUNITY
End: 2022-03-14

## 2021-10-11 RX ORDER — CHLORAL HYDRATE 500 MG
CAPSULE ORAL EVERY 24 HOURS
COMMUNITY

## 2021-10-11 RX ORDER — FOLIC ACID 0.8 MG
TABLET ORAL EVERY 24 HOURS
COMMUNITY

## 2021-10-11 RX ORDER — HYDROXYCHLOROQUINE SULFATE 200 MG/1
TABLET, FILM COATED ORAL EVERY 12 HOURS SCHEDULED
COMMUNITY
Start: 2021-07-31 | End: 2022-03-14

## 2021-10-13 RX ORDER — NAPROXEN 500 MG/1
500 TABLET ORAL
OUTPATIENT
Start: 2021-10-13

## 2021-10-13 NOTE — PROGRESS NOTES
"PROBLEM VISIT    Chief Complaint: abnormal pap smear      Suha Mcconnell is a 31 y.o. patient who presents to me as a new patient for colposcopy.  Patient was seen by the nurse practitioner 9/13/21.  At that time patient was asking for a repeat Pap smear.  Patient had had a Pap smear in 3/2021 revealing ASCUS with negative high-risk HPV.  On further questioning it was discovered that the patient has had an abnormal Pap smear in with her previous OB/GYN and it was recommended that she have a colposcopy.  The pathology from the previous Pap smear revealed ASCUS-H.  Patient presents today for that colposcopy but is stating that she does not want the colposcopy and is very anxious about it.  Patient states that her previous OB/GYN told her that if she did not do a colposcopy that she could no longer be a patient there.  Patient is nervous that if she does not proceed with a colposcopy that she cannot be seen at our practice as well.  Chief Complaint   Patient presents with   • Colposcopy             The following portions of the patient's history were reviewed and updated as appropriate: allergies, current medications and problem list.    Review of Systems   Constitutional: Negative for appetite change, chills, fatigue, fever and unexpected weight change.   Gastrointestinal: Negative for abdominal distention, abdominal pain, anal bleeding, blood in stool, constipation, diarrhea, nausea and vomiting.   Genitourinary: Negative for dyspareunia, dysuria, menstrual problem, pelvic pain, vaginal bleeding, vaginal discharge and vaginal pain.       /72   Ht 165.1 cm (65\")   Wt 77.6 kg (171 lb)   LMP 09/20/2021   BMI 28.46 kg/m²     Physical Exam  Vitals reviewed.   Constitutional:       General: She is not in acute distress.     Appearance: She is well-developed. She is not diaphoretic.   Genitourinary:     General: Normal vulva.      Labia:         Right: No rash, tenderness, lesion or injury.         Left: No rash, " tenderness, lesion or injury.       Vagina: No signs of injury and foreign body. No vaginal discharge, erythema, tenderness or bleeding.      Cervix: No cervical motion tenderness, discharge or friability.   Skin:     General: Skin is warm.      Coloration: Skin is not pale.      Findings: No erythema or rash.   Neurological:      General: No focal deficit present.      Mental Status: She is alert and oriented to person, place, and time.      Cranial Nerves: No cranial nerve deficit.      Coordination: Coordination normal.   Psychiatric:         Mood and Affect: Mood normal.         Behavior: Behavior normal.         Thought Content: Thought content normal.         Judgment: Judgment normal.           Assessment/Plan   Diagnoses and all orders for this visit:    1. Screening for genitourinary condition (Primary)  -     POC Pregnancy, Urine    2. History of abnormal cervical Papanicolaou smear  -     IgP, Aptima HPV      31-year-old with history of abnormal Pap smear    Patient's ASCUS-H Pap smear was in 8/2020.  She never had a colposcopy.  Repeat Pap smear in 3/2021 was ASCUS with negative high-risk HPV.  Patient does not want a colposcopy.  She is asking for a repeat Pap smear.  Discussed with patient that I cannot completely reassure her that she does not have an abnormality there with just a screening Pap smear.  Patient states that she would like to proceed with the Pap smear and based on those findings we will consider doing a colposcopy if the results returned back as ASCUS.  A Pap smear was performed without difficulty.  Will await results before proceeding with further management.             No follow-ups on file.      Sara Perez,     10/11/21

## 2021-10-15 LAB
CYTOLOGIST CVX/VAG CYTO: ABNORMAL
CYTOLOGY CVX/VAG DOC CYTO: ABNORMAL
CYTOLOGY CVX/VAG DOC THIN PREP: ABNORMAL
DX ICD CODE: ABNORMAL
DX ICD CODE: ABNORMAL
HIV 1 & 2 AB SER-IMP: ABNORMAL
HPV I/H RISK 4 DNA CVX QL PROBE+SIG AMP: NEGATIVE
OTHER STN SPEC: ABNORMAL
PATHOLOGIST CVX/VAG CYTO: ABNORMAL
RECOM F/U CVX/VAG CYTO: ABNORMAL
STAT OF ADQ CVX/VAG CYTO-IMP: ABNORMAL

## 2021-11-02 ENCOUNTER — TELEPHONE (OUTPATIENT)
Dept: OBSTETRICS AND GYNECOLOGY | Facility: CLINIC | Age: 31
End: 2021-11-02

## 2021-11-02 NOTE — TELEPHONE ENCOUNTER
I signed off on this pap smear yesterday but I am sure nobody has had time to call her. Her pap smear is abnormal and she needs to be scheduled for a colposcopy

## 2021-11-12 ENCOUNTER — OFFICE VISIT (OUTPATIENT)
Dept: OBSTETRICS AND GYNECOLOGY | Facility: CLINIC | Age: 31
End: 2021-11-12

## 2021-11-12 DIAGNOSIS — F41.9 ANXIETY: Primary | ICD-10-CM

## 2021-11-12 RX ORDER — NAPROXEN 500 MG/1
500 TABLET ORAL 3 TIMES DAILY PRN
Qty: 30 TABLET | Refills: 0 | Status: SHIPPED | OUTPATIENT
Start: 2021-11-12 | End: 2021-11-15 | Stop reason: SDUPTHER

## 2021-11-12 RX ORDER — ALPRAZOLAM 0.5 MG/1
1 TABLET ORAL ONCE
Qty: 1 TABLET | Refills: 0 | Status: SHIPPED | OUTPATIENT
Start: 2021-11-12 | End: 2021-11-12

## 2021-11-12 NOTE — PROGRESS NOTES
PROBLEM VISIT    Chief Complaint: cervical dysplasia      Suha Mcconnell is a 31 y.o. patient requesting a phone call to discuss her abnormal Pap smear.  I saw patient for the first time on 10/11/2021 for colposcopy.  She had been seen by Noreen Taylor in the office and Beata had recommended to proceed with a colposcopy due to patient's history of ASCUS-H Pap smear.  Patient was very upset about proceeding with a colposcopy and said she wanted another Pap smear instead.  That Pap smear is now come back as LGSIL.  Patient is asking about what LGSIL means and what her risk is progressing to cancer.  Patient states that she is extremely nervous about proceeding with a cold poss could be an cries every time she thinks about it.          The following portions of the patient's history were reviewed and updated as appropriate: allergies, current medications and problem list.    Review of Systems    There were no vitals taken for this visit.    Physical Exam      Assessment/Plan   Diagnoses and all orders for this visit:    1. Anxiety (Primary)  -     ALPRAZolam (Xanax) 0.5 MG tablet; Take 2 tablets by mouth 1 (One) Time for 1 dose.  Dispense: 1 tablet; Refill: 0    Other orders  -     naproxen (EC NAPROSYN) 500 MG EC tablet; Take 1 tablet by mouth 3 (Three) Times a Day As Needed for Mild Pain .  Dispense: 30 tablet; Refill: 0      31-year-old with cervical dysplasia    Patient has a history of an ASCUS-H Pap smear.  She now has another Pap smear that is LGSIL.  I discussed with patient that my recommendations are to proceed with colposcopy to ensure that she does not have a high-grade lesion and needs a LEEP procedure.  Patient states that she is extremely nervous and is asking for anxiety medication.  We discussed the progression of low-grade to high-grade and then the cancer.  I discussed with patient that I am willing to give her an anxiety medicine to take prior to the procedure if she has someone to drive her.  She is  also asking for refill of her naproxen for her dysmenorrhea as well as to take prior to the procedure.  A refill of the naproxen as well as Xanax 0.5 mg 1 tab was prescribed.  We will proceed with scheduling colposcopy.             No follow-ups on file.      Sara Perez DO    11/12/2021  12:56 EST

## 2021-11-15 DIAGNOSIS — Z01.812 PRE-PROCEDURE LAB EXAM: Primary | ICD-10-CM

## 2021-11-15 RX ORDER — ALPRAZOLAM 0.5 MG/1
0.5 TABLET ORAL ONCE
Qty: 1 TABLET | Refills: 0 | Status: SHIPPED | OUTPATIENT
Start: 2021-11-15 | End: 2021-11-15

## 2021-11-15 RX ORDER — NAPROXEN 500 MG/1
500 TABLET ORAL 3 TIMES DAILY PRN
Qty: 30 TABLET | Refills: 0 | Status: SHIPPED | OUTPATIENT
Start: 2021-11-15 | End: 2022-06-23

## 2021-11-15 NOTE — TELEPHONE ENCOUNTER
[facility] due to no pharmacist, but the PT's scripts for her colpo on 11/16/21 are there.   Pharm[facility] was updated to [facility], Naproxen order sent to you for refill but unable to process a refill request for the controlled substance, Xanax. Please place new order for Xanax.

## 2021-11-16 ENCOUNTER — OFFICE VISIT (OUTPATIENT)
Dept: OBSTETRICS AND GYNECOLOGY | Facility: CLINIC | Age: 31
End: 2021-11-16

## 2021-11-16 VITALS
BODY MASS INDEX: 28.29 KG/M2 | DIASTOLIC BLOOD PRESSURE: 88 MMHG | HEIGHT: 65 IN | SYSTOLIC BLOOD PRESSURE: 118 MMHG | WEIGHT: 169.8 LBS

## 2021-11-16 DIAGNOSIS — R87.612 LGSIL ON PAP SMEAR OF CERVIX: Primary | ICD-10-CM

## 2021-11-16 LAB
B-HCG UR QL: NEGATIVE
BILIRUB BLD-MCNC: NEGATIVE MG/DL
CLARITY, POC: CLEAR
COLOR UR: YELLOW
EXPIRATION DATE: NORMAL
GLUCOSE UR STRIP-MCNC: NEGATIVE MG/DL
INTERNAL NEGATIVE CONTROL: NEGATIVE
INTERNAL POSITIVE CONTROL: POSITIVE
KETONES UR QL: NEGATIVE
LEUKOCYTE EST, POC: NEGATIVE
Lab: 55
NITRITE UR-MCNC: NEGATIVE MG/ML
PH UR: 5 [PH] (ref 5–8)
PROT UR STRIP-MCNC: NEGATIVE MG/DL
RBC # UR STRIP: NEGATIVE /UL
SP GR UR: 1 (ref 1–1.03)
UROBILINOGEN UR QL: NORMAL

## 2021-11-16 PROCEDURE — 81025 URINE PREGNANCY TEST: CPT | Performed by: OBSTETRICS & GYNECOLOGY

## 2021-11-16 PROCEDURE — 57455 BIOPSY OF CERVIX W/SCOPE: CPT | Performed by: OBSTETRICS & GYNECOLOGY

## 2021-11-16 PROCEDURE — 81002 URINALYSIS NONAUTO W/O SCOPE: CPT | Performed by: OBSTETRICS & GYNECOLOGY

## 2021-11-16 RX ORDER — ALPRAZOLAM 0.5 MG/1
TABLET ORAL
COMMUNITY
Start: 2021-11-15 | End: 2022-03-14

## 2021-11-16 NOTE — PROGRESS NOTES
Colposcopy Procedure Note    Chief Complaint: LGSIL    Colposcopy    Date/Time: 11/16/2021 11:04 AM  Performed by: Sara Perez DO  Authorized by: Sara Perez DO   Preparation: Patient was prepped and draped in the usual sterile fashion.  Local anesthesia used: no    Anesthesia:  Local anesthesia used: no    Sedation:  Patient sedated: no    Patient tolerance: patient tolerated the procedure well with no immediate complications          Indications: Most recent Pap smear showed: low-grade squamous intraepithelial neoplasia (LGSIL - encompassing HPV,mild dysplasia,KIA I).  Prior cervical/vaginal disease: none.  Prior cervical treatment: no treatment.    Procedure Details   The risks and benefits of the procedure and Verbal informed consent obtained.    Speculum placed in vagina and excellent visualization of cervix achieved, cervix swabbed x 3 with acetic acid solution and Lugol's solution     Findings:  Cervix: acetowhite lesion(s) noted at 12 o'clock; cervical biopsies taken at 12 o'clock, specimen labelled and sent to pathology and hemostasis achieved with Monsel's solution.  Vaginal inspection: vaginal colposcopy not performed.  Vulvar colposcopy: vulvar colposcopy not performed.    Specimens: cervical biopsy    Complications: none.    Plan:  Specimens labelled and sent to Pathology.  Will base further treatment on Pathology findings.  Treatment options discussed with patient.    Sara Perez DO   11/16/2021  11:04 EST

## 2021-11-24 LAB
DX ICD CODE: NORMAL
DX ICD CODE: NORMAL
PATH REPORT.FINAL DX SPEC: NORMAL
PATH REPORT.GROSS SPEC: NORMAL
PATH REPORT.SITE OF ORIGIN SPEC: NORMAL
PATHOLOGIST NAME: NORMAL
PAYMENT PROCEDURE: NORMAL

## 2022-03-14 ENCOUNTER — OFFICE VISIT (OUTPATIENT)
Dept: OBSTETRICS AND GYNECOLOGY | Facility: CLINIC | Age: 32
End: 2022-03-14

## 2022-03-14 VITALS
BODY MASS INDEX: 30.49 KG/M2 | SYSTOLIC BLOOD PRESSURE: 132 MMHG | HEIGHT: 65 IN | WEIGHT: 183 LBS | DIASTOLIC BLOOD PRESSURE: 98 MMHG

## 2022-03-14 DIAGNOSIS — Z87.42 HISTORY OF ABNORMAL CERVICAL PAPANICOLAOU SMEAR: ICD-10-CM

## 2022-03-14 DIAGNOSIS — Z13.89 SCREENING FOR GENITOURINARY CONDITION: ICD-10-CM

## 2022-03-14 DIAGNOSIS — Z01.419 WELL WOMAN EXAM WITH ROUTINE GYNECOLOGICAL EXAM: Primary | ICD-10-CM

## 2022-03-14 DIAGNOSIS — Z30.431 IUD CHECK UP: ICD-10-CM

## 2022-03-14 LAB

## 2022-03-14 PROCEDURE — 99395 PREV VISIT EST AGE 18-39: CPT | Performed by: NURSE PRACTITIONER

## 2022-03-14 PROCEDURE — 81025 URINE PREGNANCY TEST: CPT | Performed by: NURSE PRACTITIONER

## 2022-03-14 PROCEDURE — 81002 URINALYSIS NONAUTO W/O SCOPE: CPT | Performed by: NURSE PRACTITIONER

## 2022-03-14 RX ORDER — ANTIARTHRITIC COMBINATION NO.2 900 MG
TABLET ORAL
COMMUNITY

## 2022-03-14 RX ORDER — FEXOFENADINE HCL 180 MG/1
TABLET ORAL
COMMUNITY

## 2022-03-14 NOTE — PROGRESS NOTES
"GYN Annual Exam     Chief Complaint   Patient presents with   • Gynecologic Exam       Suha Mcconnell is a 31 y.o. female who presents for annual well woman exam. She is an established patient. She is sexually active. Currently using ParaGard for contraception. It was placed in .  She is happy with her contraception: Yes.     Periods are irregular, reports this change occurred after the COVID vaccine. She rec'd the Moderna vaccine.. Report this last cycle was very heavy and lasted approx 7-8 days. Dysmenorrhea: reports \"not great\".     She is not doing SBE.     She has a complicated pap history. She had a ASC-H pap . It was recommended at that time she have a colpo but she declined. She then was seen in this office for an AE. Upon questioning, she reported a normal pap history. Her pap smear in 3/21 ws ASCUS/HPV neg. It was recommended a repeat pap in 1 year (the previous abnormal pap was unknown by the provider at this time). Then in , the patient presented for another AE wanting to pay out of pocket for the pap smear. When questioned regarding this, she disclosed her abnormal pap history. After a lengthy disc, it was agreed that she needed to have a colpo as previously advised by her prior OB/GYN. The patient was then sent to see Dr. Perez. In 10/21, she declined colpo and had a repeat pap only. This pap smear was abnormal with LGSIL. She finally agreed to a colpo and biopsy showed LGSIL. The recommendation was for repeat pap and colpo in 6 months. She presents today for AE so a repeat pap only will be collected.     HPI    OB History        0    Para   0    Term   0       0    AB   0    Living   0       SAB   0    IAB   0    Ectopic   0    Molar   0    Multiple   0    Live Births   0                Last Pap : 10/21 LGSIL, 3/21 ASCUS/HPV neg.  ASC-H  History of abnormal Pap smear: yes - see above  Family history of uterine, colon or ovarian cancer: no  Family history of breast cancer: " "no  History of abnormal mammogram: no  Gardasil Vaccine: has not had     Past Medical History:   Diagnosis Date   • Abdominal cramps    • Abnormal Pap smear of cervix    • Asthma    • Varicella    • Weight loss        Past Surgical History:   Procedure Laterality Date   • TONSILLECTOMY           Current Outpatient Medications:   •  albuterol sulfate HFA (Ventolin HFA) 108 (90 Base) MCG/ACT inhaler, INHALE 2 PUFFS PO Q 6 H PRN, Disp: , Rfl:   •  aspirin 81 MG oral suspension, , Disp: , Rfl:   •  Biotin 5000 MCG tablet, , Disp: , Rfl:   •  fexofenadine (QC Fexofenadine Hydrochloride) 180 MG tablet, , Disp: , Rfl:   •  Magnesium 500 MG capsule, Daily., Disp: , Rfl:   •  naproxen (EC NAPROSYN) 500 MG EC tablet, Take 1 tablet by mouth 3 (Three) Times a Day As Needed for Mild Pain., Disp: 30 tablet, Rfl: 0  •  Omega-3 Fatty Acids (fish oil) 1000 MG capsule capsule, Daily., Disp: , Rfl:   •  PARAGARD INTRAUTERINE COPPER IU, by Intrauterine route., Disp: , Rfl:   •  vitamin B-12 (CYANOCOBALAMIN) 1000 MCG tablet, See Admin Instructions., Disp: , Rfl:   •  Zinc 30 MG capsule, Take  by mouth., Disp: , Rfl:     No Known Allergies    Social History     Tobacco Use   • Smoking status: Never Smoker   • Smokeless tobacco: Never Used   Substance Use Topics   • Alcohol use: Yes     Alcohol/week: 0.0 standard drinks     Comment: casual   • Drug use: Never       Family History   Problem Relation Age of Onset   • No Known Problems Mother    • No Known Problems Father    • Breast cancer Neg Hx    • Ovarian cancer Neg Hx    • Colon cancer Neg Hx    • Uterine cancer Neg Hx        Review of Systems   Constitutional: Negative.    Respiratory: Negative.    Cardiovascular: Negative.    Gastrointestinal: Negative.    Genitourinary: Positive for menstrual problem (irreg menses).   Musculoskeletal: Negative.    Skin: Negative.    Neurological: Negative.    Psychiatric/Behavioral: Negative.        /98   Ht 165.1 cm (65\")   Wt 83 kg (183 " lb)   LMP 02/21/2022   Breastfeeding No   BMI 30.45 kg/m²     Physical Exam  Vitals reviewed.   Constitutional:       Appearance: She is well-developed.   Neck:      Thyroid: No thyromegaly.   Cardiovascular:      Rate and Rhythm: Normal rate and regular rhythm.      Heart sounds: Normal heart sounds.   Pulmonary:      Effort: Pulmonary effort is normal.      Breath sounds: Normal breath sounds.   Chest:   Breasts:      Right: No inverted nipple, mass, nipple discharge, skin change or tenderness.      Left: No inverted nipple, mass, nipple discharge, skin change or tenderness.       Abdominal:      General: Bowel sounds are normal.      Palpations: Abdomen is soft.   Genitourinary:     Exam position: Supine.      Labia:         Right: No rash, tenderness, lesion or injury.         Left: No rash, tenderness, lesion or injury.       Vagina: No signs of injury and foreign body. No vaginal discharge, erythema, tenderness or bleeding.      Cervix: No cervical motion tenderness, discharge or friability.      Uterus: Not deviated, not enlarged, not fixed and not tender.       Adnexa:         Right: No mass, tenderness or fullness.          Left: No mass, tenderness or fullness.        Rectum: Normal.   Musculoskeletal:         General: No swelling. Normal range of motion.      Cervical back: Normal range of motion and neck supple.   Skin:     General: Skin is warm and dry.   Neurological:      General: No focal deficit present.      Mental Status: She is alert and oriented to person, place, and time.   Psychiatric:         Mood and Affect: Mood normal.         Behavior: Behavior normal.            Assessment     1. Well woman exam   2. IUD- ParaGard since 2014  3. H/O abnormal pap   4. Elevated BP   5. Needs HPV vaccine    Plan   1. Well woman exam: Pap collected Yes. Instructed on how to perform SBE. Recommend MVI daily.    2. Contraception: ParaGard IUD 2014.   3. H/O abnormal pap- Check Pap and HPV today. Plan of  care TBD by pap results  4. Elevated BP- Enc pt to f/u with PCP  5. STD: Enc condoms. Desires STD screen today- No.   6. Smoking status: Non smoker   7.    Patient's Body mass index is 30.45 kg/m². indicating that she is obese (BMI >30).   8.   Needs HPV vaccine     RTO PRN       SUNDEEP Dutton  3/14/2022  13:21 EDT

## 2022-03-20 LAB
CYTOLOGIST CVX/VAG CYTO: ABNORMAL
CYTOLOGY CVX/VAG DOC CYTO: ABNORMAL
CYTOLOGY CVX/VAG DOC THIN PREP: ABNORMAL
DX ICD CODE: ABNORMAL
DX ICD CODE: ABNORMAL
HIV 1 & 2 AB SER-IMP: ABNORMAL
HPV I/H RISK 4 DNA CVX QL PROBE+SIG AMP: POSITIVE
OTHER STN SPEC: ABNORMAL
PATHOLOGIST CVX/VAG CYTO: ABNORMAL
RECOM F/U CVX/VAG CYTO: ABNORMAL
STAT OF ADQ CVX/VAG CYTO-IMP: ABNORMAL

## 2022-04-20 ENCOUNTER — TELEPHONE (OUTPATIENT)
Dept: OBSTETRICS AND GYNECOLOGY | Facility: CLINIC | Age: 32
End: 2022-04-20

## 2022-04-20 DIAGNOSIS — F41.9 ANXIETY: Primary | ICD-10-CM

## 2022-04-20 RX ORDER — ALPRAZOLAM 0.5 MG/1
0.5 TABLET ORAL ONCE
Qty: 1 TABLET | Refills: 0 | Status: SHIPPED | OUTPATIENT
Start: 2022-04-20 | End: 2022-04-20

## 2022-04-20 NOTE — TELEPHONE ENCOUNTER
Caller: Suha Mcconnell    Relationship to patient: Self    Best call back number: [phone]    Patient is needing: PT CALLED IN TO ADD IUD REMOVAL TO HER UPCOMING PROCEDURE FOR COLPO ON 5/26. SHE HAS ANXIETY AND IS TYPICALLY PRESCRIBED A XANAX FOR THE PROCEDURE. IT IS NOT SHOWING IN THE SYSTEM AND PT IS WANTING TO CONFIRM THAT IT WILL BE PRESCRIBED BEFORE THE PROCEDURE. PT IS AVAILABLE ANYTIME FOR A CALL BACK AND A DETAILED MESSAGE CAN BE LEFT IF SHE IS UNABLE TO ANSWER.

## 2022-05-26 ENCOUNTER — OFFICE VISIT (OUTPATIENT)
Dept: OBSTETRICS AND GYNECOLOGY | Facility: CLINIC | Age: 32
End: 2022-05-26

## 2022-05-26 ENCOUNTER — LAB (OUTPATIENT)
Dept: LAB | Facility: HOSPITAL | Age: 32
End: 2022-05-26

## 2022-05-26 ENCOUNTER — TELEPHONE (OUTPATIENT)
Dept: OBSTETRICS AND GYNECOLOGY | Facility: CLINIC | Age: 32
End: 2022-05-26

## 2022-05-26 VITALS
HEIGHT: 65 IN | WEIGHT: 187 LBS | BODY MASS INDEX: 31.16 KG/M2 | DIASTOLIC BLOOD PRESSURE: 88 MMHG | SYSTOLIC BLOOD PRESSURE: 126 MMHG

## 2022-05-26 DIAGNOSIS — Z87.42 HISTORY OF ABNORMAL CERVICAL PAPANICOLAOU SMEAR: Primary | ICD-10-CM

## 2022-05-26 DIAGNOSIS — Z30.432 ENCOUNTER FOR IUD REMOVAL: ICD-10-CM

## 2022-05-26 DIAGNOSIS — N87.9 CERVICAL DYSPLASIA: ICD-10-CM

## 2022-05-26 DIAGNOSIS — N92.6 MISSED MENSES: Primary | ICD-10-CM

## 2022-05-26 DIAGNOSIS — T83.32XA INTRAUTERINE CONTRACEPTIVE DEVICE THREADS LOST, INITIAL ENCOUNTER: ICD-10-CM

## 2022-05-26 LAB
B-HCG UR QL: POSITIVE
BILIRUB BLD-MCNC: NEGATIVE MG/DL
CLARITY, POC: CLEAR
COLOR UR: YELLOW
EXPIRATION DATE: ABNORMAL
GLUCOSE UR STRIP-MCNC: NEGATIVE MG/DL
HCG INTACT+B SERPL-ACNC: 75.84 MIU/ML
INTERNAL NEGATIVE CONTROL: NEGATIVE
INTERNAL POSITIVE CONTROL: POSITIVE
KETONES UR QL: NEGATIVE
LEUKOCYTE EST, POC: NEGATIVE
Lab: 55
NITRITE UR-MCNC: NEGATIVE MG/ML
PH UR: 5 [PH] (ref 5–8)
PROT UR STRIP-MCNC: NEGATIVE MG/DL
RBC # UR STRIP: NEGATIVE /UL
SP GR UR: 1 (ref 1–1.03)
UROBILINOGEN UR QL: NORMAL

## 2022-05-26 PROCEDURE — 84702 CHORIONIC GONADOTROPIN TEST: CPT

## 2022-05-26 PROCEDURE — 81002 URINALYSIS NONAUTO W/O SCOPE: CPT | Performed by: OBSTETRICS & GYNECOLOGY

## 2022-05-26 PROCEDURE — 99214 OFFICE O/P EST MOD 30 MIN: CPT | Performed by: OBSTETRICS & GYNECOLOGY

## 2022-05-26 PROCEDURE — 81025 URINE PREGNANCY TEST: CPT | Performed by: OBSTETRICS & GYNECOLOGY

## 2022-05-26 PROCEDURE — 36415 COLL VENOUS BLD VENIPUNCTURE: CPT

## 2022-05-26 NOTE — PROGRESS NOTES
"PROBLEM VISIT    Chief Complaint: cervical dysplasia      Suha Mcconnell is a 31 y.o. patient who presents for a colposcopy due to ASCUS-H on 3/14/22. She was scheduled for a colposcopy today. She had plans to also have her ParaGuard IUD removed bc she has been having a lot of cramping with it. She reports her cycles have been irregular since she had COVID in 9/2021. She doesn't remember if her last cycle was in April or May. Her UPT is positive today.   Chief Complaint   Patient presents with   • Procedure     COLPO + IUD REMOVAL + VAGINAL ITCHING             The following portions of the patient's history were reviewed and updated as appropriate: allergies, current medications and problem list.    Review of Systems   Constitutional: Negative for appetite change, chills, fatigue, fever and unexpected weight change.   Gastrointestinal: Negative for abdominal distention, abdominal pain, anal bleeding, blood in stool, constipation, diarrhea, nausea and vomiting.   Genitourinary: Positive for menstrual problem. Negative for dyspareunia, dysuria, pelvic pain, vaginal bleeding, vaginal discharge and vaginal pain.       /88   Ht 165.1 cm (65\")   Wt 84.8 kg (187 lb)   LMP 04/24/2022 (LMP Unknown)   Breastfeeding No   BMI 31.12 kg/m²     Physical Exam  Vitals reviewed.   Constitutional:       General: She is not in acute distress.     Appearance: Normal appearance. She is well-developed. She is not ill-appearing, toxic-appearing or diaphoretic.   Genitourinary:     General: Normal vulva.      Labia:         Right: No rash, tenderness, lesion or injury.         Left: No rash, tenderness, lesion or injury.       Vagina: No signs of injury and foreign body. No vaginal discharge, erythema, tenderness or bleeding.      Cervix: No cervical motion tenderness, discharge or friability.      Comments: No IUD string seen  Skin:     General: Skin is warm.      Coloration: Skin is not pale.      Findings: No erythema or rash. "   Neurological:      General: No focal deficit present.      Mental Status: She is alert and oriented to person, place, and time. Mental status is at baseline.      Cranial Nerves: No cranial nerve deficit.      Motor: No weakness.      Coordination: Coordination normal.      Gait: Gait normal.   Psychiatric:         Mood and Affect: Mood normal.         Behavior: Behavior normal.         Thought Content: Thought content normal.         Judgment: Judgment normal.           Assessment & Plan   Diagnoses and all orders for this visit:    1. Missed menses (Primary)  -     Cancel: HCG, B-subunit, Quantitative  -     HCG, B-subunit, Quantitative    2. Encounter for IUD removal  -     POC Urinalysis Dipstick  -     POC Pregnancy, Urine    3. Cervical dysplasia    4. Intrauterine contraceptive device threads lost, initial encounter      32yo with cervical dysplasia, missing IUD strings and +UPT    1) Cervical dysplasia: ASCUS-H in 3/2022. Pt was scheduled for a colposcopy today but will reschedule colposcopy once it has been determined if patient is pregnant and where her IUD is located.    2) Missing IUD: IUD string on seen or palpated on PE. TVUS ordered and reveals: AV uterus with EM lining 1.7cm. Right ovary with simple cyst measuring 1.7 x 1.2 x 1.7cm with complex area measuring 1.6 x 1.5 x 1.7cm. Normal left ovary. Free fluid in cul de sac and right adnexa. No IUD noted.    3) +UPT: Will check BHCG quant today and follow them to evaluate for doubling. Discussed with pt that she is at increased risk of an ectopic pregnancy if IUD was in place at time of conception. Precautions reviewed.             No follow-ups on file.      Sara Perez DO    5/26/2022  11:56 EDT

## 2022-05-31 ENCOUNTER — LAB (OUTPATIENT)
Dept: LAB | Facility: HOSPITAL | Age: 32
End: 2022-05-31

## 2022-05-31 ENCOUNTER — LAB (OUTPATIENT)
Dept: OBSTETRICS AND GYNECOLOGY | Facility: CLINIC | Age: 32
End: 2022-05-31

## 2022-05-31 DIAGNOSIS — Z32.00 ENCOUNTER FOR CONFIRMATION OF PREGNANCY TEST RESULT WITH PHYSICAL EXAMINATION: Primary | ICD-10-CM

## 2022-05-31 DIAGNOSIS — Z87.42 HISTORY OF ABNORMAL CERVICAL PAPANICOLAOU SMEAR: Primary | ICD-10-CM

## 2022-05-31 LAB — HCG INTACT+B SERPL-ACNC: 160.9 MIU/ML

## 2022-05-31 PROCEDURE — 36415 COLL VENOUS BLD VENIPUNCTURE: CPT

## 2022-05-31 PROCEDURE — 84702 CHORIONIC GONADOTROPIN TEST: CPT

## 2022-06-01 ENCOUNTER — OFFICE VISIT (OUTPATIENT)
Dept: OBSTETRICS AND GYNECOLOGY | Facility: CLINIC | Age: 32
End: 2022-06-01

## 2022-06-01 VITALS
HEIGHT: 65 IN | SYSTOLIC BLOOD PRESSURE: 122 MMHG | BODY MASS INDEX: 29.99 KG/M2 | DIASTOLIC BLOOD PRESSURE: 74 MMHG | WEIGHT: 180 LBS

## 2022-06-01 DIAGNOSIS — N92.6 MISSED MENSES: Primary | ICD-10-CM

## 2022-06-01 LAB — HCG INTACT+B SERPL-ACNC: 173 MIU/ML

## 2022-06-01 PROCEDURE — 99212 OFFICE O/P EST SF 10 MIN: CPT | Performed by: OBSTETRICS & GYNECOLOGY

## 2022-06-01 NOTE — PROGRESS NOTES
"      Suha Mcconnell is a 31 y.o. patient who presents for follow up of   Chief Complaint   Patient presents with   • Follow-up     U/S       HPI 31-year-old female here for early pregnancy versus ectopic.  She unexpectedly had an IUD fall out and showed up for colposcopy with a positive pregnancy test.  She has not had regular cycles for many months.  She is unsure of conception.  She is currently asymptomatic with no vaginal bleeding and no abdominal pain.  Her quantitative hCGs tonio from 75-1 60.  Ultrasound shows nothing in the uterus.  There is a small right sided cyst versus complex area that is grown from 1 to 2 cm over the last week.  She presents for reultrasound today.    The following portions of the patient's history were reviewed and updated as appropriate: allergies, current medications and problem list.    Review of Systems   Constitutional: Negative for appetite change, fever and unexpected weight change.   HENT: Negative for congestion and sore throat.    Respiratory: Negative for cough and shortness of breath.    Cardiovascular: Negative for chest pain and palpitations.   Gastrointestinal: Negative for abdominal distention, abdominal pain, constipation, diarrhea, nausea and vomiting.   Endocrine: Negative.    Genitourinary: Positive for menstrual problem (missed menses). Negative for dyspareunia, pelvic pain and vaginal discharge.   Skin: Negative.    Neurological: Negative for dizziness and syncope.   Hematological: Negative.    Psychiatric/Behavioral: Negative for dysphoric mood and sleep disturbance. The patient is not nervous/anxious.        /74   Ht 165.1 cm (65\")   Wt 81.6 kg (180 lb)   LMP 04/24/2022 (LMP Unknown)   BMI 29.95 kg/m²     Physical Exam  Vitals and nursing note reviewed.   Constitutional:       Appearance: She is well-developed.   HENT:      Head: Normocephalic and atraumatic.   Pulmonary:      Effort: Pulmonary effort is normal. No respiratory distress.   Abdominal:      " General: There is no distension.      Palpations: Abdomen is soft. There is no mass.      Tenderness: There is no abdominal tenderness. There is no guarding or rebound.   Musculoskeletal:         General: Normal range of motion.   Skin:     General: Skin is warm and dry.   Neurological:      Mental Status: She is alert and oriented to person, place, and time.   Psychiatric:         Behavior: Behavior normal.         Thought Content: Thought content normal.         Judgment: Judgment normal.     Ultrasound today shows a thickened endometrial lining thicker than last week.  Left ovary is normal.  Right ovary has a 2 x 2 x 2 cm complex area that is grown slightly from last week.  No cul-de-sac fluid.      Assessment/Plan    Diagnoses and all orders for this visit:    1. Missed menses (Primary)  -     HCG, B-subunit, Quantitative; Future    Suspect early IUP versus SAB.  Continue to follow serial hCGs.  Ectopic warnings given.  hCG to be drawn tomorrow.  Repeat ultrasound and visit next week.    Return in about 1 week (around 6/8/2022) for US, TV, Follow up with me.      Tayo Carrasco MD  6/1/2022  14:13 EDT

## 2022-06-02 ENCOUNTER — LAB (OUTPATIENT)
Dept: LAB | Facility: HOSPITAL | Age: 32
End: 2022-06-02

## 2022-06-02 ENCOUNTER — TELEPHONE (OUTPATIENT)
Dept: OBSTETRICS AND GYNECOLOGY | Facility: CLINIC | Age: 32
End: 2022-06-02

## 2022-06-02 DIAGNOSIS — N92.6 MISSED MENSES: ICD-10-CM

## 2022-06-02 LAB — HCG INTACT+B SERPL-ACNC: 439 MIU/ML

## 2022-06-02 PROCEDURE — 36415 COLL VENOUS BLD VENIPUNCTURE: CPT

## 2022-06-02 PROCEDURE — 84702 CHORIONIC GONADOTROPIN TEST: CPT

## 2022-06-02 NOTE — TELEPHONE ENCOUNTER
Pt called inquiring on her labs she had drawn yesterday. Pt was told to call office today to discuss them with you. Please inform. EDNA

## 2022-06-07 ENCOUNTER — OFFICE VISIT (OUTPATIENT)
Dept: OBSTETRICS AND GYNECOLOGY | Facility: CLINIC | Age: 32
End: 2022-06-07

## 2022-06-07 VITALS
BODY MASS INDEX: 30.66 KG/M2 | WEIGHT: 184 LBS | HEIGHT: 65 IN | SYSTOLIC BLOOD PRESSURE: 130 MMHG | DIASTOLIC BLOOD PRESSURE: 84 MMHG

## 2022-06-07 DIAGNOSIS — O36.80X0 PREGNANCY WITH INCONCLUSIVE FETAL VIABILITY, SINGLE OR UNSPECIFIED FETUS: Primary | ICD-10-CM

## 2022-06-07 PROCEDURE — 99212 OFFICE O/P EST SF 10 MIN: CPT | Performed by: OBSTETRICS & GYNECOLOGY

## 2022-06-08 NOTE — PROGRESS NOTES
"      Suha Mcconnell is a 31 y.o. patient who presents for follow up of   Chief Complaint   Patient presents with   • Follow-up     US       HPI 31-year-old G1 who is being followed for positive pregnancy test done at the time of colposcopy visit.  Quantitative hCGs have been rising.  She denies any bleeding.  Presents today for repeat ultrasound.    The following portions of the patient's history were reviewed and updated as appropriate: allergies, current medications and problem list.    Review of Systems   Constitutional: Negative for appetite change, fever and unexpected weight change.   HENT: Negative for congestion and sore throat.    Respiratory: Negative for cough and shortness of breath.    Cardiovascular: Negative for chest pain and palpitations.   Gastrointestinal: Negative for abdominal distention, abdominal pain, constipation, diarrhea, nausea and vomiting.   Endocrine: Negative.    Genitourinary: Positive for menstrual problem (missed menses). Negative for dyspareunia, pelvic pain and vaginal discharge.   Skin: Negative.    Neurological: Negative for dizziness and syncope.   Hematological: Negative.    Psychiatric/Behavioral: Negative for dysphoric mood and sleep disturbance. The patient is not nervous/anxious.        /84   Ht 165.1 cm (65\")   Wt 83.5 kg (184 lb)   LMP 04/24/2022 (LMP Unknown)   Breastfeeding No   BMI 30.62 kg/m²     Physical Exam  Vitals and nursing note reviewed.   Constitutional:       Appearance: She is well-developed.   HENT:      Head: Normocephalic and atraumatic.   Pulmonary:      Effort: Pulmonary effort is normal. No respiratory distress.   Abdominal:      General: There is no distension.      Palpations: Abdomen is soft. There is no mass.      Tenderness: There is no abdominal tenderness. There is no guarding or rebound.   Musculoskeletal:         General: Normal range of motion.   Skin:     General: Skin is warm and dry.   Neurological:      Mental Status: She is " alert and oriented to person, place, and time.   Psychiatric:         Behavior: Behavior normal.         Thought Content: Thought content normal.         Judgment: Judgment normal.     Ultrasound reviewed with patient.  Gestational sac seen in the intrauterine cavity.  Nabothian cyst seen in cervix.  Adnexa clear.  Sac measures 5 weeks.      Assessment/Plan    Diagnoses and all orders for this visit:    1. Pregnancy with inconclusive fetal viability, single or unspecified fetus (Primary)    Early IUP.  Repeat ultrasound in 2 weeks.  New OB visit in 2 weeks.  Bleeding warnings given.    Return in about 2 weeks (around 6/21/2022) for US, TV, NOB.      Tayo Carrasco MD  6/8/2022  12:57 EDT

## 2022-06-17 LAB — HBA1C MFR BLD: 5.3 %

## 2022-06-23 ENCOUNTER — INITIAL PRENATAL (OUTPATIENT)
Dept: OBSTETRICS AND GYNECOLOGY | Facility: CLINIC | Age: 32
End: 2022-06-23

## 2022-06-23 VITALS — SYSTOLIC BLOOD PRESSURE: 112 MMHG | BODY MASS INDEX: 30.95 KG/M2 | WEIGHT: 186 LBS | DIASTOLIC BLOOD PRESSURE: 86 MMHG

## 2022-06-23 DIAGNOSIS — Z34.91 INITIAL OBSTETRIC VISIT IN FIRST TRIMESTER: Primary | ICD-10-CM

## 2022-06-23 DIAGNOSIS — Z36.9 ENCOUNTER FOR ANTENATAL SCREENING, UNSPECIFIED: ICD-10-CM

## 2022-06-23 DIAGNOSIS — R87.611 ATYPICAL SQUAMOUS CELLS CANNOT EXCLUDE HIGH GRADE SQUAMOUS INTRAEPITHELIAL LESION ON CYTOLOGIC SMEAR OF CERVIX (ASC-H): ICD-10-CM

## 2022-06-23 LAB
EXTERNAL CYSTIC FIBROSIS: NORMAL
EXTERNAL HEMATOCRIT: 40 %
EXTERNAL HEMOGLOBIN: 13.3 G/DL

## 2022-06-23 PROCEDURE — 0501F PRENATAL FLOW SHEET: CPT | Performed by: NURSE PRACTITIONER

## 2022-06-23 NOTE — PROGRESS NOTES
"Initial ob visit     Chief Complaint   Patient presents with   • Initial Prenatal Visit       Suha Mcconnell is being seen today for her first obstetrical visit.  She is a 31 y.o.  @  6w6d gestation. This problem is new to me: yes. When she found out she was pregnant, she has a friend that is an \"anti-aging doctor that elvia some labs.\" She presented her lab results and asked for no labs to be duplicated with her new OB labs today. She is accompanied by her boyfriend today. She learned she was pregnant at a visit for her colposcopy.     # 1 - Date: None, Sex: None, Weight: None, GA: None, Delivery: None, Apgar1: None, Apgar5: None, Living: None, Birth Comments: None      LNMP: 22    Taking prenatal vitamins: Yes  Planned pregnancy: No  Prior obstetric issues, potential pregnancy concerns: FIRST PREGNANCY   Family history of genetic issues (includes FOB): FOB adopted, pt denies   Varicella Hx: disease as child  Flu vaccine: S/P flu vaccine  COVID Vaccine: S/P Vaccine x2, enc booster   History of STDs: H/O HSV 1, denies genital sores   Current medications: PNV   Last pap smear:  ASC-H (Extensive history of abnromal pap)   Smoker: No  Drug or alcohol abuse: No  H/O Physical, mental or sexual abuse: denies   H/O mental health disorder: denies dx   Prior testing for Cystic Fibrosis Carrier or Sickle Cell Trait- has not had   Prepregnancy BMI: Body mass index is 30.95 kg/m².      Past Medical History:   Diagnosis Date   • Abdominal cramps    • Abnormal Pap smear of cervix    • Asthma    • Varicella    • Weight loss        Past Surgical History:   Procedure Laterality Date   • TONSILLECTOMY           Current Outpatient Medications:   •  albuterol sulfate  (90 Base) MCG/ACT inhaler, INHALE 2 PUFFS PO Q 6 H PRN, Disp: , Rfl:   •  Biotin 5000 MCG tablet, , Disp: , Rfl:   •  fexofenadine (ALLEGRA) 180 MG tablet, , Disp: , Rfl:   •  Magnesium 500 MG capsule, Daily., Disp: , Rfl:   •  naproxen (EC NAPROSYN) " 500 MG EC tablet, Take 1 tablet by mouth 3 (Three) Times a Day As Needed for Mild Pain., Disp: 30 tablet, Rfl: 0  •  Omega-3 Fatty Acids (fish oil) 1000 MG capsule capsule, Daily., Disp: , Rfl:   •  vitamin B-12 (CYANOCOBALAMIN) 1000 MCG tablet, See Admin Instructions., Disp: , Rfl:   •  Zinc 30 MG capsule, Take  by mouth., Disp: , Rfl:     No Known Allergies    Social History     Socioeconomic History   • Marital status: Single   Tobacco Use   • Smoking status: Never Smoker   • Smokeless tobacco: Never Used   Substance and Sexual Activity   • Alcohol use: Yes     Alcohol/week: 0.0 standard drinks     Comment: casual   • Drug use: Never   • Sexual activity: Yes     Partners: Male     Birth control/protection: I.U.D.     Comment: ParaGuard- August 2014       Family History   Problem Relation Age of Onset   • No Known Problems Mother    • No Known Problems Father    • Breast cancer Neg Hx    • Ovarian cancer Neg Hx    • Colon cancer Neg Hx    • Uterine cancer Neg Hx        Review of systems     All other systems reviewed and are negative except for: Gastrointestinal: positive for nausea     Objective    /86   Wt 84.4 kg (186 lb)   LMP 04/24/2022 (LMP Unknown)   BMI 30.95 kg/m²       General Appearance:    Alert, cooperative, in no acute distress, habitus overweight    Head:    Not examined   Eyes:           Not examined   Ears:  Not examined       Neck:  Def   Back:     No kyphosis present, no scoliosis present,                       Lungs:   Def    Heart:  Def   Breast Exam:    Def    Abdomen:   Def   Genitalia:  Def   Extremities:   Moves all extremities well, no edema, no cyanosis, no              redness       Skin:   No bleeding, bruising or rash       Neurologic:   Sensation intact, A&O times 3      Assessment/Plan    1) Pregnancy at 6w6d- US IMP: [place], viable IUP @ 6.6 weeks. EDC 2/10/23. FHR 138bpm. Uterus AV. Ovaries wnl. . US findings discussed with patient. EDC established 2/10/23 and confirmed  by US. EDC by [facility] rejected.      2) OB exam: OB exam completed: Yes. New OB bag provided Yes. Pap collected: No.    3) Labs: OB labs collected: Yes Counseled on genetic screening: Yes, she desires CF, SMA and FX. Counseled on Quad screen and AFP: No, she is too early for AFP. Counseled on NIPS: Yes, she is too early for NIPS.      4) Body mass index is 30.95 kg/m². Obese women with a pre-pregnancy BMI of 30+ should strive to gain approx 11-20 pounds for the entire pregnancy.     5)  Prenatal care: Oriented to the office and prenatal care. Encourage prenatal vitamins. Disc Tylenol products are fine, avoid aspirin and ibuprofen; Zika (travel restrictions/ok to use insect repellant); not to change cat litter; food restrictions; exercise;  avoidance of alcohol, tobacco, drugs and saunas/hot tubs.     6) COVID19 precautions were reviewed with the patient. Continue to encourage social distancing, wearing a mask, and good hand hygiene.  I wore a mask, protective eye wear, and gloves during this patient encounter.  Patient also wearing a surgical mask and social distancing was observed. Hand hygeine performed before and after seeing the patient. Info provided on Covid vaccine: S/P COVID x2, enc booster     7) ASC-H/HPV+: needs colpo. Disc no biopsy would be taken d/t pregnancy.     8) Labs- Will not collect CBC or HgbA1C per pt request.     9) Nausea- Safe med list provided     All questions answered.     RTO 4 weeks     SUNDEEP Dutton  6/23/2022  15:37 EDT

## 2022-06-24 LAB
ABO GROUP BLD: NORMAL
BASOPHILS # BLD AUTO: 0.1 X10E3/UL (ref 0–0.2)
BASOPHILS NFR BLD AUTO: 1 %
BLD GP AB SCN SERPL QL: NEGATIVE
EOSINOPHIL # BLD AUTO: 0.1 X10E3/UL (ref 0–0.4)
EOSINOPHIL NFR BLD AUTO: 2 %
ERYTHROCYTE [DISTWIDTH] IN BLOOD BY AUTOMATED COUNT: 12.4 % (ref 11.7–15.4)
HBV SURFACE AG SERPL QL IA: NEGATIVE
HCT VFR BLD AUTO: 39.8 % (ref 34–46.6)
HCV AB S/CO SERPL IA: 0.1 S/CO RATIO (ref 0–0.9)
HGB BLD-MCNC: 13.7 G/DL (ref 11.1–15.9)
HIV 1+2 AB+HIV1 P24 AG SERPL QL IA: NON REACTIVE
IMM GRANULOCYTES # BLD AUTO: 0 X10E3/UL (ref 0–0.1)
IMM GRANULOCYTES NFR BLD AUTO: 0 %
LYMPHOCYTES # BLD AUTO: 2.1 X10E3/UL (ref 0.7–3.1)
LYMPHOCYTES NFR BLD AUTO: 27 %
MCH RBC QN AUTO: 31.9 PG (ref 26.6–33)
MCHC RBC AUTO-ENTMCNC: 34.4 G/DL (ref 31.5–35.7)
MCV RBC AUTO: 93 FL (ref 79–97)
MONOCYTES # BLD AUTO: 0.8 X10E3/UL (ref 0.1–0.9)
MONOCYTES NFR BLD AUTO: 11 %
NEUTROPHILS # BLD AUTO: 4.7 X10E3/UL (ref 1.4–7)
NEUTROPHILS NFR BLD AUTO: 59 %
PLATELET # BLD AUTO: 280 X10E3/UL (ref 150–450)
RBC # BLD AUTO: 4.29 X10E6/UL (ref 3.77–5.28)
RH BLD: POSITIVE
RPR SER QL: NON REACTIVE
RUBV IGG SERPL IA-ACNC: 2.85 INDEX
VZV IGG SER IA-ACNC: 1430 INDEX
WBC # BLD AUTO: 7.8 X10E3/UL (ref 3.4–10.8)

## 2022-06-25 LAB
AMPHETAMINES UR QL SCN: NEGATIVE NG/ML
BACTERIA UR CULT: NORMAL
BACTERIA UR CULT: NORMAL
BARBITURATES UR QL SCN: NEGATIVE NG/ML
BENZODIAZ UR QL SCN: NEGATIVE NG/ML
BZE UR QL SCN: NEGATIVE NG/ML
C TRACH RRNA SPEC QL NAA+PROBE: NEGATIVE
CANNABINOIDS UR QL SCN: NEGATIVE NG/ML
CREAT UR-MCNC: 226.7 MG/DL (ref 20–300)
LABORATORY COMMENT REPORT: NORMAL
METHADONE UR QL SCN: NEGATIVE NG/ML
N GONORRHOEA RRNA SPEC QL NAA+PROBE: NEGATIVE
OPIATES UR QL SCN: NEGATIVE NG/ML
OXYCODONE+OXYMORPHONE UR QL SCN: NEGATIVE NG/ML
PCP UR QL: NEGATIVE NG/ML
PH UR: 5 [PH] (ref 4.5–8.9)
PROPOXYPH UR QL SCN: NEGATIVE NG/ML
T VAGINALIS RRNA SPEC QL NAA+PROBE: NEGATIVE

## 2022-06-30 LAB
CLINICAL GENETICS COUNSELING NOTE: NORMAL
CLINICAL INFO: NORMAL
CYSTIC FIBROSIS MUTATION 97: NORMAL
ETHNIC BACKGROUND STATED: NORMAL
FMR1 GENE CGG RPT BLD/T QL: NORMAL
GENE DIS ANL CARRIER INTERP-IMP: NORMAL
LAB DIRECTOR NAME PROVIDER: NORMAL
LABORATORY COMMENT REPORT: NORMAL
LABORATORY COMMENT REPORT: NORMAL
REASON FOR REFERRAL (NARRATIVE): NORMAL
REF LAB TEST METHOD: NORMAL
SMN1 GENE MUT ANL BLD/T: NORMAL
SPECIMEN SOURCE: NORMAL
TEST PERFORMANCE INFO SPEC: NORMAL
TEST PERFORMANCE INFO SPEC: NORMAL

## 2022-07-20 ENCOUNTER — ROUTINE PRENATAL (OUTPATIENT)
Dept: OBSTETRICS AND GYNECOLOGY | Facility: CLINIC | Age: 32
End: 2022-07-20

## 2022-07-20 VITALS — WEIGHT: 187 LBS | BODY MASS INDEX: 31.12 KG/M2 | DIASTOLIC BLOOD PRESSURE: 82 MMHG | SYSTOLIC BLOOD PRESSURE: 124 MMHG

## 2022-07-20 DIAGNOSIS — R87.611 ATYPICAL SQUAMOUS CELLS CANNOT EXCLUDE HIGH GRADE SQUAMOUS INTRAEPITHELIAL LESION ON CYTOLOGIC SMEAR OF CERVIX (ASC-H): Primary | ICD-10-CM

## 2022-07-20 LAB
GLUCOSE UR STRIP-MCNC: NEGATIVE MG/DL
PROT UR STRIP-MCNC: NEGATIVE MG/DL

## 2022-07-20 PROCEDURE — 99212 OFFICE O/P EST SF 10 MIN: CPT | Performed by: OBSTETRICS & GYNECOLOGY

## 2022-07-20 NOTE — PROGRESS NOTES
31-year-old G1 at 10 weeks and 5 days presents for colposcopy.  6 months ago patient had a ASCUS Pap smear followed by a biopsy that showed HPV effect.  Now she is currently pregnant and her last Pap was ASCUS cannot rule out high-grade squamous intraepithelial lesion.  She is a non-smoker.  I went in to explain the procedure in detail.  Patient declined colposcopy because she is too anxious and she cannot take NSAIDs while she was pregnant.  She says the pain after the biopsies are severe and requires NSAID in the past and that because she cannot take it while pregnant declines to have colposcopy done today.  Patient understands that this Pap smear may be showing worsening compared with that 6 months ago.  She understands the risk of delaying follow-up until 6-week postpartum in March 2023.  Her significant other was present and heard the entire discussion.  Questions were answered.  She does desire noninvasive prenatal testing being drawn today.  15 minutes was spent with the patient and her significant other explaining risk benefits alternatives.    Tayo Carrasco MD

## 2022-07-28 ENCOUNTER — ROUTINE PRENATAL (OUTPATIENT)
Dept: OBSTETRICS AND GYNECOLOGY | Facility: CLINIC | Age: 32
End: 2022-07-28

## 2022-07-28 VITALS — DIASTOLIC BLOOD PRESSURE: 76 MMHG | WEIGHT: 185 LBS | SYSTOLIC BLOOD PRESSURE: 120 MMHG | BODY MASS INDEX: 30.79 KG/M2

## 2022-07-28 DIAGNOSIS — Z87.42 HISTORY OF ABNORMAL CERVICAL PAPANICOLAOU SMEAR: ICD-10-CM

## 2022-07-28 DIAGNOSIS — Z34.90 PREGNANCY, UNSPECIFIED GESTATIONAL AGE: ICD-10-CM

## 2022-07-28 DIAGNOSIS — J45.20 MILD INTERMITTENT ASTHMA WITHOUT COMPLICATION: ICD-10-CM

## 2022-07-28 DIAGNOSIS — R87.611 ATYPICAL SQUAMOUS CELLS CANNOT EXCLUDE HIGH GRADE SQUAMOUS INTRAEPITHELIAL LESION ON CYTOLOGIC SMEAR OF CERVIX (ASC-H): ICD-10-CM

## 2022-07-28 DIAGNOSIS — B00.9 HSV-1 INFECTION: Primary | ICD-10-CM

## 2022-07-28 PROBLEM — Z34.91 INITIAL OBSTETRIC VISIT IN FIRST TRIMESTER: Status: RESOLVED | Noted: 2022-06-23 | Resolved: 2022-07-28

## 2022-07-28 PROBLEM — Z01.419 PAP SMEAR, LOW-RISK: Status: RESOLVED | Noted: 2021-09-13 | Resolved: 2022-07-28

## 2022-07-28 PROBLEM — Z30.431 IUD CHECK UP: Status: RESOLVED | Noted: 2021-03-17 | Resolved: 2022-07-28

## 2022-07-28 LAB
EXTERNAL NIPT: NORMAL
GLUCOSE UR STRIP-MCNC: NEGATIVE MG/DL
PROT UR STRIP-MCNC: NEGATIVE MG/DL

## 2022-07-28 PROCEDURE — 0502F SUBSEQUENT PRENATAL CARE: CPT | Performed by: OBSTETRICS & GYNECOLOGY

## 2022-07-28 RX ORDER — FLUCONAZOLE 150 MG/1
150 TABLET ORAL ONCE
Qty: 1 TABLET | Refills: 1 | Status: SHIPPED | OUTPATIENT
Start: 2022-07-28 | End: 2022-07-30

## 2022-07-28 NOTE — PROGRESS NOTES
"OB follow up     Suha Mcconnell is a 31 y.o.  11w6d being seen today for her obstetrical visit. Fetal movement: not yet. She called and requested an appointment because she felt like her last visit was \" all about the colpo\". Overall, she feels much better in the second trimester. She does have a yeast infection. She first asked me to write in her chart not to bring up colpo again. I explained that each provider feels a responsibility to go over areas of concern in her health in general and related to pregnancy.  While we have no intention of harassing her, each provider will likely review her problem list at each visit.  This in no way obligates her to undergo colpo and/or biopsy, but the education component of our job is very important. [place] discussion of a topic of such importance is not likely to lead to a collegial and collaborative therapeutic alliance.  We also discussed the importance of colposcopy earlier in pregnancy due to some of the changes in the cervix as pregnancy progresses that may exaggerate and/or obscure important findings.  Overall, we had a very productive discussion and all of her questions were answered. She said she will call back and let us know if she decides to undergo colpo.     Review of Systems  No bleeding, No cramping/contractions     /76   Wt 83.9 kg (185 lb)   LMP 2022 (LMP Unknown)   BMI 30.79 kg/m²     FHT: 162 BPM   Uterine Size: 12  cm       Assessment/Plan:    1) 31 y.o.  -pregnancy at 11w6d- NIPT low risk. Check AFP > 15 weeks    2) Yeast infection- ERX Diflucan 150 mg    3) ACACIA- H pap- pt declines colpo for now.     4) HSV 1- no genital lesions    5) S/P C19 vaccine x 2. I saw the patient with a face mask, gloves and eye protection  The patient herself was masked.  Social distancing was observed as appropriate.    6)CF/SMA/FX neg    7) Asthma- pt has inhaler at home, rarely needed.    8) Reviewed this stage of pregnancy    9) Problem list " updated     10) RTO as scheduled for OBT and AFP > 15 weeks      Sravanthi Obando MD    7/28/2022  10:13 EDT

## 2022-08-16 ENCOUNTER — ROUTINE PRENATAL (OUTPATIENT)
Dept: OBSTETRICS AND GYNECOLOGY | Facility: CLINIC | Age: 32
End: 2022-08-16

## 2022-08-16 VITALS — BODY MASS INDEX: 31.55 KG/M2 | DIASTOLIC BLOOD PRESSURE: 88 MMHG | SYSTOLIC BLOOD PRESSURE: 140 MMHG | WEIGHT: 189.6 LBS

## 2022-08-16 DIAGNOSIS — Z3A.14 14 WEEKS GESTATION OF PREGNANCY: Primary | ICD-10-CM

## 2022-08-16 LAB
GLUCOSE UR STRIP-MCNC: NEGATIVE MG/DL
PROT UR STRIP-MCNC: NEGATIVE MG/DL

## 2022-08-16 PROCEDURE — 0502F SUBSEQUENT PRENATAL CARE: CPT | Performed by: OBSTETRICS & GYNECOLOGY

## 2022-08-16 NOTE — PROGRESS NOTES
Pt is a 31 y.o. @14w4d here for an ob check  I saw the patient with a face mask, gloves and eye protection  The patient herself was masked.  Social distancing was observed as appropriate. All COVID precautions observed.   Discussed pap results,

## 2022-09-12 RX ORDER — FLUCONAZOLE 200 MG/1
200 TABLET ORAL ONCE
Qty: 1 TABLET | Refills: 2 | Status: SHIPPED | OUTPATIENT
Start: 2022-09-12 | End: 2022-09-14

## 2022-09-20 ENCOUNTER — TELEPHONE (OUTPATIENT)
Dept: OBSTETRICS AND GYNECOLOGY | Facility: CLINIC | Age: 32
End: 2022-09-20

## 2022-09-20 NOTE — TELEPHONE ENCOUNTER
Caller: DEVAN KEYA     Phone Number: [phone] OK TO [facility]     Reason for Call: PT WANTS TO CONFIRM SHE WILL BE HAVING A COLPOSCOPY ON HER 9/27/22 OB APPT

## 2022-09-27 ENCOUNTER — ROUTINE PRENATAL (OUTPATIENT)
Dept: OBSTETRICS AND GYNECOLOGY | Facility: CLINIC | Age: 32
End: 2022-09-27

## 2022-09-27 VITALS — WEIGHT: 189 LBS | SYSTOLIC BLOOD PRESSURE: 140 MMHG | DIASTOLIC BLOOD PRESSURE: 80 MMHG | BODY MASS INDEX: 31.45 KG/M2

## 2022-09-27 DIAGNOSIS — Z36.9 ENCOUNTER FOR ANTENATAL SCREENING, UNSPECIFIED: Primary | ICD-10-CM

## 2022-09-27 PROCEDURE — 0502F SUBSEQUENT PRENATAL CARE: CPT | Performed by: STUDENT IN AN ORGANIZED HEALTH CARE EDUCATION/TRAINING PROGRAM

## 2022-09-27 NOTE — PROGRESS NOTES
Ob follow up      Suha Mcconnell is a 32 y.o.  20w4d patient being seen today for her obstetrical visit. Patient reports no complaints. Fetal movement: Not feeling yet ( Seen on TAUS during anatomy scan) .      ROS - Denies leaking fluid, vaginal bleeding and notes good fetal movement.     /80   Wt 85.7 kg (189 lb)   LMP 2022 (LMP Unknown)   BMI 31.45 kg/m²       Vitals: VSS; AF    General Appearance:  Awake. Alert. Well developed. Well nourished. In no acute distress.    Visual Inspection: ° Abdomen was normal on visual inspection.  Palpation: ° Abdomen was soft. ° Abdominal non-tender.    Uterus: ° Fundal height was normal for gestational age. ° Not tender.  Uterine Adnexae: ° Normal without masses or tenderness.  Neurological:  ° Oriented to time, place, and person.  Skin:  ° General appearance was normal. No bruising or ecchymosis.  Obstetrical:    A/P      1) 31 y.o.  -pregnancy at 20+4- NIPT low risk. Check AFP  Today      2) ACACIA- H pap- pt declines colpo for now.      3) HSV 1- no genital lesions     4) S/P C19 vaccine x 2. I saw the patient with a face mask, gloves and eye protection  The patient herself was masked.  Social distancing was observed as appropriate.     5)CF/SMA/FX neg     6) Asthma- pt has inhaler at home, rarely needed.     7) Reviewed this stage of pregnancy     8) Problem list updated      9) RTO as scheduled for OBT  In 4 weeks ; Declines flu vaccine today     Plan    F/U 4 weeks   Flu vaccine next appt   Dr Pastrana to see per pt for Colpo      Dylan Avila DO     2022  16:31 EDT

## 2022-09-29 LAB
AFP INTERP SERPL-IMP: NORMAL
AFP INTERP SERPL-IMP: NORMAL
AFP MOM SERPL: 0.87
AFP SERPL-MCNC: 48.5 NG/ML
AGE AT DELIVERY: 31.3 YR
GA METHOD: NORMAL
GA: 20.6 WEEKS
IDDM PATIENT QL: NO
LABORATORY COMMENT REPORT: NORMAL
MULTIPLE PREGNANCY: NO
NEURAL TUBE DEFECT RISK FETUS: NORMAL %
RESULT: NORMAL

## 2022-10-20 ENCOUNTER — TELEPHONE (OUTPATIENT)
Dept: OBSTETRICS AND GYNECOLOGY | Facility: CLINIC | Age: 32
End: 2022-10-20

## 2022-10-26 ENCOUNTER — ROUTINE PRENATAL (OUTPATIENT)
Dept: OBSTETRICS AND GYNECOLOGY | Facility: CLINIC | Age: 32
End: 2022-10-26

## 2022-10-26 VITALS
WEIGHT: 200.8 LBS | SYSTOLIC BLOOD PRESSURE: 160 MMHG | BODY MASS INDEX: 33.41 KG/M2 | DIASTOLIC BLOOD PRESSURE: 100 MMHG

## 2022-10-26 DIAGNOSIS — R03.0 BORDERLINE HIGH BLOOD PRESSURE: Primary | ICD-10-CM

## 2022-10-26 PROBLEM — R87.613 HGSIL (HIGH GRADE SQUAMOUS INTRAEPITHELIAL LESION) ON PAP SMEAR OF CERVIX: Status: ACTIVE | Noted: 2022-10-26

## 2022-10-26 LAB
GLUCOSE UR STRIP-MCNC: NEGATIVE MG/DL
PROT UR STRIP-MCNC: NEGATIVE MG/DL

## 2022-10-26 PROCEDURE — 99024 POSTOP FOLLOW-UP VISIT: CPT | Performed by: OBSTETRICS & GYNECOLOGY

## 2022-11-04 PROBLEM — R03.0 BORDERLINE HIGH BLOOD PRESSURE: Status: ACTIVE | Noted: 2022-11-04

## 2022-11-23 ENCOUNTER — ROUTINE PRENATAL (OUTPATIENT)
Dept: OBSTETRICS AND GYNECOLOGY | Facility: CLINIC | Age: 32
End: 2022-11-23

## 2022-11-23 ENCOUNTER — LAB (OUTPATIENT)
Dept: OBSTETRICS AND GYNECOLOGY | Facility: CLINIC | Age: 32
End: 2022-11-23
Payer: COMMERCIAL

## 2022-11-23 VITALS — DIASTOLIC BLOOD PRESSURE: 82 MMHG | BODY MASS INDEX: 33.61 KG/M2 | WEIGHT: 202 LBS | SYSTOLIC BLOOD PRESSURE: 134 MMHG

## 2022-11-23 DIAGNOSIS — Z87.42 HISTORY OF ABNORMAL CERVICAL PAPANICOLAOU SMEAR: ICD-10-CM

## 2022-11-23 DIAGNOSIS — J45.20 MILD INTERMITTENT ASTHMA WITHOUT COMPLICATION: ICD-10-CM

## 2022-11-23 DIAGNOSIS — B00.9 HSV-1 INFECTION: ICD-10-CM

## 2022-11-23 DIAGNOSIS — R87.611 ATYPICAL SQUAMOUS CELLS CANNOT EXCLUDE HIGH GRADE SQUAMOUS INTRAEPITHELIAL LESION ON CYTOLOGIC SMEAR OF CERVIX (ASC-H): ICD-10-CM

## 2022-11-23 DIAGNOSIS — Z3A.28 28 WEEKS GESTATION OF PREGNANCY: ICD-10-CM

## 2022-11-23 DIAGNOSIS — Z36.9 ENCOUNTER FOR ANTENATAL SCREENING, UNSPECIFIED: Primary | ICD-10-CM

## 2022-11-23 PROCEDURE — 0502F SUBSEQUENT PRENATAL CARE: CPT | Performed by: STUDENT IN AN ORGANIZED HEALTH CARE EDUCATION/TRAINING PROGRAM

## 2022-11-23 NOTE — PROGRESS NOTES
Ob follow up      Suha Mcconnell is a 32 y.o.  28+ patient being seen today for her obstetrical visit. Patient reports no complaints. Fetal movement: + . Had Colpo with Dr. Victoriano TY - Denies leaking fluid, vaginal bleeding and notes good fetal movement.     /82   Wt 91.6 kg (202 lb)   LMP 2022 (LMP Unknown)   BMI 33.61 kg/m²       Vitals: VSS; AF    General Appearance:  Awake. Alert. Well developed. Well nourished. In no acute distress.    Visual Inspection: ° Abdomen was normal on visual inspection.  Palpation: ° Abdomen was soft. ° Abdominal non-tender.    Uterus: ° Fundal height was normal for gestational age. ° Not tender.  Uterine Adnexae: ° Normal without masses or tenderness.  Neurological:  ° Oriented to time, place, and person.  Skin:  ° General appearance was normal. No bruising or ecchymosis.  Obstetrical:+FM and FCA     A/P      1) 31 y.o.  -pregnancy at 28+-   NIPT low risk.   AFP Neg     2) ACACIA- H pap- Colpo no Lesions   Needs ECC PP      3) HSV 1- no genital lesions     4) S/P C19 vaccine x 2. I saw the patient with a face mask, gloves and eye protection  The patient herself was masked.  Social distancing was observed as appropriate.     5)CF/SMA/FX neg     6) Asthma- pt has inhaler at home, rarely needed.     7) Reviewed this stage of pregnancy     8) Problem list updated      9) RTO as scheduled for OBT  In 4 weeks ; Declines flu vaccine and Tdap today     Plan    F/U 2 weeks   Flu vaccine, Tdap next [facility] [place]     I spent 30 minutes caring for Suha on this date of service. This time includes time spent by me in the following activities: preparing for the visit, reviewing tests, obtaining and/or reviewing a separately obtained history, performing a medically appropriate examination and/or evaluation, counseling and educating the patient/family/caregiver, ordering medications, tests, or procedures, documenting information in the medical record, independently  interpreting results and communicating that information with the patient/family/caregiver and care coordination       Dylan Avila DO     11/23/2022  09:10 EST

## 2022-11-24 LAB
GLUCOSE 1H P 75 G GLC PO SERPL-MCNC: 159 MG/DL (ref 65–179)
GLUCOSE 2H P 75 G GLC PO SERPL-MCNC: 94 MG/DL (ref 65–154)
GLUCOSE P FAST SERPL-MCNC: 79 MG/DL (ref 65–94)
HCT VFR BLD AUTO: 35.1 % (ref 34–46.6)
HGB BLD-MCNC: 12.1 G/DL (ref 12–15.9)

## 2022-12-07 ENCOUNTER — ROUTINE PRENATAL (OUTPATIENT)
Dept: OBSTETRICS AND GYNECOLOGY | Facility: CLINIC | Age: 32
End: 2022-12-07

## 2022-12-07 VITALS — SYSTOLIC BLOOD PRESSURE: 118 MMHG | DIASTOLIC BLOOD PRESSURE: 86 MMHG | BODY MASS INDEX: 34.11 KG/M2 | WEIGHT: 205 LBS

## 2022-12-07 DIAGNOSIS — Z34.93 PRENATAL CARE IN THIRD TRIMESTER: Primary | ICD-10-CM

## 2022-12-07 LAB
GLUCOSE UR STRIP-MCNC: NEGATIVE MG/DL
PROT UR STRIP-MCNC: ABNORMAL MG/DL

## 2022-12-07 PROCEDURE — 0502F SUBSEQUENT PRENATAL CARE: CPT | Performed by: NURSE PRACTITIONER

## 2022-12-07 NOTE — PROGRESS NOTES
OB follow up > 20 weeks    Chief Complaint   Patient presents with   • Routine Prenatal Visit       Suha Mcconnell is a 32 y.o.  30w5d being seen today for her obstetrical visit.  Patient reports no complaints. Taking prenatal vitamins: Yes      Review of Systems  Genitourinary: Negative for contractions, cramping, vaginal bleeding, or SROM.   Fetal movement: normal  No Known Allergies     /86   Wt 93 kg (205 lb)   LMP 2022 (LMP Unknown)   BMI 34.11 kg/m²     FHT: 140s BPM   Uterine Size: size equals dates       Assessment    1) pregnancy at 30w5d- Passed 2hr GTT. Rh +.     2) Declines flu vaccine    3) Covid vaccine- S/P vaccine, enc booster.     4) Declines tDap    5) Asthma- Inhaler at home for PRN use.    6) Taking prenatal classes. Undecided on peds. Male- disc circumcision. Breast feeding. Considering epidural.     Plan    Continue prenatal vitamins  Reviewed this stage of pregnancy  Problem list updated   Follow up in 2 weeks    SUNDEEP Dutton  2022  16:21 EST

## 2022-12-21 ENCOUNTER — ROUTINE PRENATAL (OUTPATIENT)
Dept: OBSTETRICS AND GYNECOLOGY | Facility: CLINIC | Age: 32
End: 2022-12-21

## 2022-12-21 VITALS — BODY MASS INDEX: 34.78 KG/M2 | SYSTOLIC BLOOD PRESSURE: 122 MMHG | DIASTOLIC BLOOD PRESSURE: 82 MMHG | WEIGHT: 209 LBS

## 2022-12-21 DIAGNOSIS — R87.613 HGSIL (HIGH GRADE SQUAMOUS INTRAEPITHELIAL LESION) ON PAP SMEAR OF CERVIX: Primary | ICD-10-CM

## 2022-12-21 DIAGNOSIS — B00.9 HSV-1 INFECTION: ICD-10-CM

## 2022-12-21 DIAGNOSIS — J45.20 MILD INTERMITTENT ASTHMA WITHOUT COMPLICATION: ICD-10-CM

## 2022-12-21 LAB
GLUCOSE UR STRIP-MCNC: NEGATIVE MG/DL
PROT UR STRIP-MCNC: NEGATIVE MG/DL

## 2022-12-21 PROCEDURE — 0502F SUBSEQUENT PRENATAL CARE: CPT | Performed by: OBSTETRICS & GYNECOLOGY

## 2022-12-21 RX ORDER — PRENATAL VIT/IRON FUM/FOLIC AC 27MG-0.8MG
TABLET ORAL DAILY
COMMUNITY

## 2022-12-21 NOTE — PROGRESS NOTES
Pt is a 32 y.o. @32w5d here for ob check.  Pt has declined all vaccinations. Pt without complaints.  This stage of pregnancy reviewed.

## 2023-01-04 ENCOUNTER — ROUTINE PRENATAL (OUTPATIENT)
Dept: OBSTETRICS AND GYNECOLOGY | Facility: CLINIC | Age: 33
End: 2023-01-04
Payer: COMMERCIAL

## 2023-01-04 VITALS — DIASTOLIC BLOOD PRESSURE: 88 MMHG | WEIGHT: 212 LBS | BODY MASS INDEX: 35.28 KG/M2 | SYSTOLIC BLOOD PRESSURE: 140 MMHG

## 2023-01-04 DIAGNOSIS — R87.613 HGSIL (HIGH GRADE SQUAMOUS INTRAEPITHELIAL LESION) ON PAP SMEAR OF CERVIX: ICD-10-CM

## 2023-01-04 DIAGNOSIS — R03.0 BORDERLINE HIGH BLOOD PRESSURE: ICD-10-CM

## 2023-01-04 DIAGNOSIS — B00.9 HSV-1 INFECTION: ICD-10-CM

## 2023-01-04 DIAGNOSIS — R87.611 ATYPICAL SQUAMOUS CELLS CANNOT EXCLUDE HIGH GRADE SQUAMOUS INTRAEPITHELIAL LESION ON CYTOLOGIC SMEAR OF CERVIX (ASC-H): ICD-10-CM

## 2023-01-04 DIAGNOSIS — Z3A.34 34 WEEKS GESTATION OF PREGNANCY: ICD-10-CM

## 2023-01-04 DIAGNOSIS — Z36.9 ENCOUNTER FOR ANTENATAL SCREENING, UNSPECIFIED: Primary | ICD-10-CM

## 2023-01-04 DIAGNOSIS — J45.20 MILD INTERMITTENT ASTHMA WITHOUT COMPLICATION: ICD-10-CM

## 2023-01-04 DIAGNOSIS — Z87.42 HISTORY OF ABNORMAL CERVICAL PAPANICOLAOU SMEAR: ICD-10-CM

## 2023-01-04 LAB
GLUCOSE UR STRIP-MCNC: NEGATIVE MG/DL
PROT UR STRIP-MCNC: NEGATIVE MG/DL

## 2023-01-04 PROCEDURE — 0502F SUBSEQUENT PRENATAL CARE: CPT | Performed by: STUDENT IN AN ORGANIZED HEALTH CARE EDUCATION/TRAINING PROGRAM

## 2023-01-05 LAB
ALBUMIN SERPL-MCNC: 4.1 G/DL (ref 3.5–5.2)
ALBUMIN/GLOB SERPL: 1.7 G/DL
ALP SERPL-CCNC: 112 U/L (ref 39–117)
ALT SERPL-CCNC: 13 U/L (ref 1–33)
AST SERPL-CCNC: 16 U/L (ref 1–32)
BILIRUB SERPL-MCNC: 0.5 MG/DL (ref 0–1.2)
BUN SERPL-MCNC: 7 MG/DL (ref 6–20)
BUN/CREAT SERPL: 15.2 (ref 7–25)
CALCIUM SERPL-MCNC: 9.2 MG/DL (ref 8.6–10.5)
CHLORIDE SERPL-SCNC: 103 MMOL/L (ref 98–107)
CO2 SERPL-SCNC: 21 MMOL/L (ref 22–29)
CREAT SERPL-MCNC: 0.46 MG/DL (ref 0.57–1)
EGFRCR SERPLBLD CKD-EPI 2021: 131.4 ML/MIN/1.73
ERYTHROCYTE [DISTWIDTH] IN BLOOD BY AUTOMATED COUNT: 11.7 % (ref 12.3–15.4)
GLOBULIN SER CALC-MCNC: 2.4 GM/DL
GLUCOSE SERPL-MCNC: 87 MG/DL (ref 65–99)
HCT VFR BLD AUTO: 34.3 % (ref 34–46.6)
HGB BLD-MCNC: 12.2 G/DL (ref 12–15.9)
MCH RBC QN AUTO: 33.2 PG (ref 26.6–33)
MCHC RBC AUTO-ENTMCNC: 35.6 G/DL (ref 31.5–35.7)
MCV RBC AUTO: 93.5 FL (ref 79–97)
PLATELET # BLD AUTO: 232 10*3/MM3 (ref 140–450)
POTASSIUM SERPL-SCNC: 3.9 MMOL/L (ref 3.5–5.2)
PROT SERPL-MCNC: 6.5 G/DL (ref 6–8.5)
RBC # BLD AUTO: 3.67 10*6/MM3 (ref 3.77–5.28)
SODIUM SERPL-SCNC: 136 MMOL/L (ref 136–145)
WBC # BLD AUTO: 8.5 10*3/MM3 (ref 3.4–10.8)

## 2023-01-05 NOTE — PROGRESS NOTES
OB follow up > 20 weeks    Chief Complaint   Patient presents with   • Routine Prenatal Visit       Suha Mcconnell is a 32 y.o.  34w5d being seen today for her obstetrical visit.  Patient reports back pain . Taking prenatal vitamins: Yes  Denies s/s of Pre-e SF       Review of Systems  Genitourinary: Negative for contractions, cramping, vaginal bleeding, or SROM.   Fetal movement: normal  No Known Allergies     /88   Wt 96.2 kg (212 lb)   LMP 2022 (LMP Unknown)   BMI 35.28 kg/m²   Vitals: VSS; AF    General Appearance:  Awake. Alert. Well developed. Well nourished. In no acute distress.    Visual Inspection: ° Abdomen was normal on visual inspection.  Palpation: ° Abdomen was soft. ° Abdominal non-tender.    Uterus: ° Fundal height was normal for gestational age. ° Not tender.  Uterine Adnexae: ° Normal without masses or tenderness.  Neurological:  ° Oriented to time, place, and person.  Skin:  ° General appearance was normal. No bruising or ecchymosis.  Obstetrical:+FM and FCA     Assessment    1) pregnancy at 34w5d- Passed 2hr GTT. Rh +.     2) Declines flu vaccine    3) Covid vaccine- S/P vaccine, enc booster.     4) Declines tDap    5) Asthma- Inhaler at home for PRN use.    6) Taking prenatal classes. Undecided on peds. Male- disc circumcision. Breast feeding. Considering epidural.     7) Concern for GHTN   Baseline labs today; drop 24hr UA and B/P check on Friday   Growth on Friday   If b/p elevated would recommend NST/BPP weekly with B/P check   IOL 38wga or as indicated   Of note is on ASA daily     Plan    Continue prenatal vitamins  Reviewed this stage of pregnancy  Problem list updated   Follow up Friday for above     I spent >30 minutes caring for Suha on this date of service. This time includes time spent by me in the following activities: preparing for the visit, reviewing tests, obtaining and/or reviewing a separately obtained history, performing a medically appropriate  examination and/or evaluation, counseling and educating the patient/family/caregiver, ordering medications, tests, or procedures, documenting information in the medical record, independently interpreting results and communicating that information with the patient/family/caregiver and care coordination     Dylan Avila DO  1/5/2023  08:11 EST

## 2023-01-06 ENCOUNTER — ROUTINE PRENATAL (OUTPATIENT)
Dept: OBSTETRICS AND GYNECOLOGY | Facility: CLINIC | Age: 33
End: 2023-01-06
Payer: COMMERCIAL

## 2023-01-06 VITALS — WEIGHT: 211.2 LBS | BODY MASS INDEX: 35.15 KG/M2 | DIASTOLIC BLOOD PRESSURE: 78 MMHG | SYSTOLIC BLOOD PRESSURE: 132 MMHG

## 2023-01-06 DIAGNOSIS — R03.0 BORDERLINE HIGH BLOOD PRESSURE: Primary | ICD-10-CM

## 2023-01-06 DIAGNOSIS — Z3A.35 35 WEEKS GESTATION OF PREGNANCY: ICD-10-CM

## 2023-01-06 LAB
GLUCOSE UR STRIP-MCNC: NEGATIVE MG/DL
PROT UR STRIP-MCNC: NEGATIVE MG/DL

## 2023-01-06 PROCEDURE — 0502F SUBSEQUENT PRENATAL CARE: CPT | Performed by: OBSTETRICS & GYNECOLOGY

## 2023-01-06 NOTE — PROGRESS NOTES
OB follow up     Chief Complaint: PNC FU    Suha Mcconnell is a 32 y.o.  35w0d being seen today for her obstetrical visit.  Pt was seen 2 days ago with elevated BP. She presents today for repeat BP, US and to drop off 24hr urine.    Review of Systems  No bleeding, No cramping/contractions     /78   Wt 95.8 kg (211 lb 3.2 oz)   LMP 2022 (LMP Unknown)   BMI 35.15 kg/m²     FHT:   present   Uterine Size:           Assessment/Plan: Low risk pregnancy    1) pregnancy at 35w0d: Check GBBS next visit     2) ? CHTN: Pt has had elevated BP's prior to 20 weeks. Suspect pt has CHTN that has never been diagnosed. She denies a hx of CHTN. Pre  E labs normal. US today with EFW 61% and MELISSA 10cm. 24hr urine pending. Pt is on ASA daily    3) declines flu and tDap    4) s/p Covid vaccine    5) Asthma: IH at home for prn use.    Reviewed this stage of pregnancy  Problem list updated   No follow-ups on file.      Sara Perez,     2023  11:51 EST

## 2023-01-09 LAB
PROT 24H UR-MRATE: NORMAL MG/24HOURS
PROT UR-MCNC: <4 MG/DL

## 2023-01-10 ENCOUNTER — TELEPHONE (OUTPATIENT)
Dept: OBSTETRICS AND GYNECOLOGY | Facility: CLINIC | Age: 33
End: 2023-01-10
Payer: COMMERCIAL

## 2023-01-10 NOTE — TELEPHONE ENCOUNTER
Patient Calling would like to know if she needs to redo her 24 hr urine test since it was cancelled?

## 2023-01-10 NOTE — TELEPHONE ENCOUNTER
It was nt cancelled. Her 24hr urine had such little protein in it that a 24hr could not be calculated. This is a good thig

## 2023-01-12 ENCOUNTER — ROUTINE PRENATAL (OUTPATIENT)
Dept: OBSTETRICS AND GYNECOLOGY | Facility: CLINIC | Age: 33
End: 2023-01-12
Payer: COMMERCIAL

## 2023-01-12 VITALS — BODY MASS INDEX: 35.45 KG/M2 | DIASTOLIC BLOOD PRESSURE: 76 MMHG | WEIGHT: 213 LBS | SYSTOLIC BLOOD PRESSURE: 128 MMHG

## 2023-01-12 DIAGNOSIS — Z3A.35 35 WEEKS GESTATION OF PREGNANCY: ICD-10-CM

## 2023-01-12 DIAGNOSIS — R87.611 ATYPICAL SQUAMOUS CELLS CANNOT EXCLUDE HIGH GRADE SQUAMOUS INTRAEPITHELIAL LESION ON CYTOLOGIC SMEAR OF CERVIX (ASC-H): ICD-10-CM

## 2023-01-12 DIAGNOSIS — B00.9 HSV-1 INFECTION: ICD-10-CM

## 2023-01-12 DIAGNOSIS — R87.613 HGSIL (HIGH GRADE SQUAMOUS INTRAEPITHELIAL LESION) ON PAP SMEAR OF CERVIX: ICD-10-CM

## 2023-01-12 DIAGNOSIS — J45.20 MILD INTERMITTENT ASTHMA WITHOUT COMPLICATION: ICD-10-CM

## 2023-01-12 DIAGNOSIS — R03.0 BORDERLINE HIGH BLOOD PRESSURE: Primary | ICD-10-CM

## 2023-01-12 PROCEDURE — 0502F SUBSEQUENT PRENATAL CARE: CPT | Performed by: STUDENT IN AN ORGANIZED HEALTH CARE EDUCATION/TRAINING PROGRAM

## 2023-01-12 NOTE — PROGRESS NOTES
OB follow up     Chief Complaint: PNC FU    Suha Raines is a 32 y.o.  35+6 being seen today for her obstetrical visit.  Denies RUQ pain, vision changes    Review of Systems  No bleeding, No cramping/contractions     Wt 96.6 kg (213 lb)   LMP 2022 (LMP Unknown)   BMI 35.45 kg/m²     Vitals: VSS; AF    General Appearance:  Awake. Alert. Well developed. Well nourished. In no acute distress.    Visual Inspection: ° Abdomen was normal on visual inspection.  Palpation: ° Abdomen was soft. ° Abdominal non-tender.    Uterus: ° Fundal height was normal for gestational age. ° Not tender.  Uterine Adnexae: ° Normal without masses or tenderness.  Neurological:  ° Oriented to time, place, and person.  Skin:  ° General appearance was normal. No bruising or ecchymosis.  Obstetrical:   Presentation: VTX  Placenta: Posterior  MELISSA: 13cm  FCA: 150's    BPP          Assessment/Plan: Low risk pregnancy    1) pregnancy at 35w6d: Check GBBS next visit     2) ? CHTN: Pt has had elevated BP's prior to 20 weeks. Suspect pt has CHTN that has never been diagnosed. She denies a hx of CHTN. Pre  E labs normal. US today with EFW 61% and MELISSA 10cm. 24hr urine reassuring. Pt is on ASA daily    3) declines flu and tDap    4) s/p Covid vaccine    5) Asthma: IH at home for prn use.    Reviewed this stage of pregnancy  Problem list updated   F/u weekly, NST/BPP     She is strongly against IOL. Discussed ACOG recommendation for hypertensive disorders of pregnancy. We discussed if she declines she risks harm to herself or fetus to include cerebral edema, pulmonary edema, seizure, end organ dysfunction,  abruption maternal/fetal demise.   It is her right to decline the standard of care but I strongly recommend IOL by 38wga     I spent 30 minutes caring for Suha  on this date of service. This time includes time spent by me in the following activities: preparing for the visit, reviewing tests, obtaining and/or reviewing a separately  obtained history, performing a medically appropriate examination and/or evaluation, counseling and educating the patient/family/caregiver, ordering medications, tests, or procedures, documenting information in the medical record, independently interpreting results and communicating that information with the patient/family/caregiver and care coordination     Dylan Avila DO    1/12/2023  16:02 EST

## 2023-01-19 ENCOUNTER — ROUTINE PRENATAL (OUTPATIENT)
Dept: OBSTETRICS AND GYNECOLOGY | Facility: CLINIC | Age: 33
End: 2023-01-19
Payer: COMMERCIAL

## 2023-01-19 VITALS — DIASTOLIC BLOOD PRESSURE: 88 MMHG | SYSTOLIC BLOOD PRESSURE: 140 MMHG | WEIGHT: 215 LBS | BODY MASS INDEX: 35.78 KG/M2

## 2023-01-19 DIAGNOSIS — R03.0 BORDERLINE HIGH BLOOD PRESSURE: ICD-10-CM

## 2023-01-19 DIAGNOSIS — J45.20 MILD INTERMITTENT ASTHMA WITHOUT COMPLICATION: ICD-10-CM

## 2023-01-19 DIAGNOSIS — Z36.85 ENCOUNTER FOR ANTENATAL SCREENING FOR STREPTOCOCCUS B: Primary | ICD-10-CM

## 2023-01-19 DIAGNOSIS — Z3A.36 36 WEEKS GESTATION OF PREGNANCY: ICD-10-CM

## 2023-01-19 DIAGNOSIS — Z87.42 HISTORY OF ABNORMAL CERVICAL PAPANICOLAOU SMEAR: ICD-10-CM

## 2023-01-19 DIAGNOSIS — B00.9 HSV-1 INFECTION: ICD-10-CM

## 2023-01-19 LAB
GLUCOSE UR STRIP-MCNC: NEGATIVE MG/DL
GLUCOSE UR STRIP-MCNC: NEGATIVE MG/DL
PROT UR STRIP-MCNC: NEGATIVE MG/DL
PROT UR STRIP-MCNC: NEGATIVE MG/DL

## 2023-01-19 PROCEDURE — 59025 FETAL NON-STRESS TEST: CPT | Performed by: STUDENT IN AN ORGANIZED HEALTH CARE EDUCATION/TRAINING PROGRAM

## 2023-01-19 PROCEDURE — 0502F SUBSEQUENT PRENATAL CARE: CPT | Performed by: STUDENT IN AN ORGANIZED HEALTH CARE EDUCATION/TRAINING PROGRAM

## 2023-01-19 NOTE — PROGRESS NOTES
OB follow up     Chief Complaint: PNC FU    Suha Raines is a 32 y.o.  36+6 being seen today for her obstetrical visit.  Denies RUQ pain, vision changes. Had BPP before Appt     Review of Systems  No bleeding, No cramping/contractions     Wt 97.5 kg (215 lb)   LMP 2022 (LMP Unknown)   BMI 35.78 kg/m²     Vitals: VSS; AF    General Appearance:  Awake. Alert. Well developed. Well nourished. In no acute distress.    Visual Inspection: ° Abdomen was normal on visual inspection.  Palpation: ° Abdomen was soft. ° Abdominal non-tender.    Uterus: ° Fundal height was normal for gestational age. ° Not tender.  Uterine Adnexae: ° Normal without masses or tenderness.  Neurological:  ° Oriented to time, place, and person.  Skin:  ° General appearance was normal. No bruising or ecchymosis.  Obstetrical:     Presentation: VTX  Placenta: Posterior   MELISSA: 14cm  FCA: 130's    NST reactive > 20 minutes   BPP          Assessment/Plan: Low risk pregnancy    1) pregnancy at 36w6d:  VTX US  GBS obtained     2) ? CHTN: Pt has had elevated BP's prior to 20 weeks. Suspect pt has CHTN that has never been diagnosed. She denies a hx of CHTN. Pre  E labs normal. US today with EFW 61% and MELISSA 10cm. 24hr urine reassuring. Pt is on ASA daily    3) declines flu and tDap    4) s/p Covid vaccine    5) Asthma: IH at home for prn use.    Reviewed this stage of pregnancy  Problem list updated   F/u weekly, NST/BPP     She is strongly against IOL. Discussed ACOG recommendation for hypertensive disorders of pregnancy. We discussed if she declines she risks harm to herself or fetus to include cerebral edema, pulmonary edema, seizure, end organ dysfunction,  abruption maternal/fetal demise.   It is her right to decline the standard of care but I strongly recommend IOL by 38wga for GHTN. If CHTN by at least 39+6wga unless     I spent 30 minutes caring for Suha  on this date of service. This time includes time spent by me in the following  activities: preparing for the visit, reviewing tests, obtaining and/or reviewing a separately obtained history, performing a medically appropriate examination and/or evaluation, counseling and educating the patient/family/caregiver, ordering medications, tests, or procedures, documenting information in the medical record, independently interpreting results and communicating that information with the patient/family/caregiver and care coordination     Dylan Avila, DO    1/19/2023  16:11 EST

## 2023-01-21 LAB — GP B STREP DNA SPEC QL NAA+PROBE: NEGATIVE

## 2023-01-26 ENCOUNTER — ROUTINE PRENATAL (OUTPATIENT)
Dept: OBSTETRICS AND GYNECOLOGY | Facility: CLINIC | Age: 33
End: 2023-01-26
Payer: COMMERCIAL

## 2023-01-26 VITALS — SYSTOLIC BLOOD PRESSURE: 136 MMHG | WEIGHT: 211.2 LBS | DIASTOLIC BLOOD PRESSURE: 84 MMHG | BODY MASS INDEX: 35.15 KG/M2

## 2023-01-26 DIAGNOSIS — R87.613 HGSIL (HIGH GRADE SQUAMOUS INTRAEPITHELIAL LESION) ON PAP SMEAR OF CERVIX: ICD-10-CM

## 2023-01-26 DIAGNOSIS — R03.0 BORDERLINE HIGH BLOOD PRESSURE: Primary | ICD-10-CM

## 2023-01-26 DIAGNOSIS — Z3A.37 37 WEEKS GESTATION OF PREGNANCY: ICD-10-CM

## 2023-01-26 DIAGNOSIS — J45.20 MILD INTERMITTENT ASTHMA WITHOUT COMPLICATION: ICD-10-CM

## 2023-01-26 DIAGNOSIS — B00.9 HSV-1 INFECTION: ICD-10-CM

## 2023-01-26 LAB
GLUCOSE UR STRIP-MCNC: NEGATIVE MG/DL
PROT UR STRIP-MCNC: ABNORMAL MG/DL
PROT UR STRIP-MCNC: NEGATIVE MG/DL

## 2023-01-26 PROCEDURE — 0502F SUBSEQUENT PRENATAL CARE: CPT | Performed by: STUDENT IN AN ORGANIZED HEALTH CARE EDUCATION/TRAINING PROGRAM

## 2023-01-26 NOTE — PROGRESS NOTES
OB follow up     Chief Complaint: PNC FU    Suha Raines is a 32 y.o.  37+6 being seen today for her obstetrical visit.  Denies RUQ pain, vision changes or HA . Had BPP before Appt     Review of Systems  No bleeding, No cramping/contractions     Wt 95.8 kg (211 lb 3.2 oz)   LMP 2022 (LMP Unknown)   BMI 35.15 kg/m²     Vitals: VSS; AF    General Appearance:  Awake. Alert. Well developed. Well nourished. In no acute distress.    Visual Inspection: ° Abdomen was normal on visual inspection.  Palpation: ° Abdomen was soft. ° Abdominal non-tender.    Uterus: ° Fundal height was normal for gestational age. ° Not tender.  Uterine Adnexae: ° Normal without masses or tenderness.  Neurological:  ° Oriented to time, place, and person.  Skin:  ° General appearance was normal. No bruising or ecchymosis.  Obstetrical:     Presentation: VTX  Placenta: Anterior  MELISSA: 16cm  FCA: 130's    BPP 8/8    NST reactive > 20 minutes     Assessment/Plan: Low risk pregnancy    1) pregnancy at 37w6d:  VTX US  GBS Negative    2) ? CHTN: Pt has had elevated BP's prior to 20 weeks. Suspect pt has CHTN that has never been diagnosed. She denies a hx of CHTN. Pre  E labs normal. US today with EFW 61% and MELISSA 10cm. 24hr urine reassuring. Pt is on ASA daily  NST and BPP reassuring  CMP, CBC today ; results pending   B/P at home normotensive     3) Declines flu and tDap    4) s/p Covid vaccine    5) Asthma: IH at home for prn use.    Reviewed this stage of pregnancy  Problem list updated   F/u weekly, NST/BPP     She is strongly against IOL. Discussed ACOG recommendation for hypertensive disorders of pregnancy. We discussed if she declines she risks harm to herself or fetus to include cerebral edema, pulmonary edema, seizure, end organ dysfunction,  Abruption, and/or maternal/fetal demise.   It is her right to decline the standard of care but I strongly recommend IOL by 38wga for GHTN. If CHTN by at least 39+6wga unless. In this  complicated CHTN vs GHTN. B/P appears pt may have CHTN, whitecoat HTN in the office, as she is normotensive at home. We will see weekly until delivery or prn.     I spent 30 minutes caring for Suha  on this date of service. This time includes time spent by me in the following activities: preparing for the visit, reviewing tests, obtaining and/or reviewing a separately obtained history, performing a medically appropriate examination and/or evaluation, counseling and educating the patient/family/caregiver, ordering medications, tests, or procedures, documenting information in the medical record, independently interpreting results and communicating that information with the patient/family/caregiver and care coordination     Dylan Avila,     1/26/2023  14:35 EST

## 2023-01-27 LAB
ALBUMIN SERPL-MCNC: 4 G/DL (ref 3.5–5.2)
ALBUMIN/GLOB SERPL: 1.6 G/DL
ALP SERPL-CCNC: 160 U/L (ref 39–117)
ALT SERPL-CCNC: 13 U/L (ref 1–33)
AST SERPL-CCNC: 14 U/L (ref 1–32)
BASOPHILS # BLD AUTO: 0.04 10*3/MM3 (ref 0–0.2)
BASOPHILS NFR BLD AUTO: 0.5 % (ref 0–1.5)
BILIRUB SERPL-MCNC: 0.7 MG/DL (ref 0–1.2)
BUN SERPL-MCNC: 7 MG/DL (ref 6–20)
BUN/CREAT SERPL: 12.5 (ref 7–25)
CALCIUM SERPL-MCNC: 9.5 MG/DL (ref 8.6–10.5)
CHLORIDE SERPL-SCNC: 102 MMOL/L (ref 98–107)
CO2 SERPL-SCNC: 23.1 MMOL/L (ref 22–29)
CREAT SERPL-MCNC: 0.56 MG/DL (ref 0.57–1)
EGFRCR SERPLBLD CKD-EPI 2021: 125.3 ML/MIN/1.73
EOSINOPHIL # BLD AUTO: 0.08 10*3/MM3 (ref 0–0.4)
EOSINOPHIL NFR BLD AUTO: 0.9 % (ref 0.3–6.2)
ERYTHROCYTE [DISTWIDTH] IN BLOOD BY AUTOMATED COUNT: 12 % (ref 12.3–15.4)
GLOBULIN SER CALC-MCNC: 2.5 GM/DL
GLUCOSE SERPL-MCNC: 72 MG/DL (ref 65–99)
HCT VFR BLD AUTO: 38.1 % (ref 34–46.6)
HGB BLD-MCNC: 13.1 G/DL (ref 12–15.9)
IMM GRANULOCYTES # BLD AUTO: 0.03 10*3/MM3 (ref 0–0.05)
IMM GRANULOCYTES NFR BLD AUTO: 0.4 % (ref 0–0.5)
LYMPHOCYTES # BLD AUTO: 1.97 10*3/MM3 (ref 0.7–3.1)
LYMPHOCYTES NFR BLD AUTO: 23.4 % (ref 19.6–45.3)
MCH RBC QN AUTO: 33.2 PG (ref 26.6–33)
MCHC RBC AUTO-ENTMCNC: 34.4 G/DL (ref 31.5–35.7)
MCV RBC AUTO: 96.7 FL (ref 79–97)
MONOCYTES # BLD AUTO: 0.84 10*3/MM3 (ref 0.1–0.9)
MONOCYTES NFR BLD AUTO: 10 % (ref 5–12)
NEUTROPHILS # BLD AUTO: 5.47 10*3/MM3 (ref 1.7–7)
NEUTROPHILS NFR BLD AUTO: 64.8 % (ref 42.7–76)
NRBC BLD AUTO-RTO: 0 /100 WBC (ref 0–0.2)
PLATELET # BLD AUTO: 254 10*3/MM3 (ref 140–450)
POTASSIUM SERPL-SCNC: 4.1 MMOL/L (ref 3.5–5.2)
PROT SERPL-MCNC: 6.5 G/DL (ref 6–8.5)
RBC # BLD AUTO: 3.94 10*6/MM3 (ref 3.77–5.28)
SODIUM SERPL-SCNC: 137 MMOL/L (ref 136–145)
WBC # BLD AUTO: 8.43 10*3/MM3 (ref 3.4–10.8)

## 2023-02-02 ENCOUNTER — ROUTINE PRENATAL (OUTPATIENT)
Dept: OBSTETRICS AND GYNECOLOGY | Facility: CLINIC | Age: 33
End: 2023-02-02
Payer: COMMERCIAL

## 2023-02-02 VITALS — SYSTOLIC BLOOD PRESSURE: 138 MMHG | BODY MASS INDEX: 35.28 KG/M2 | DIASTOLIC BLOOD PRESSURE: 88 MMHG | WEIGHT: 212 LBS

## 2023-02-02 DIAGNOSIS — R03.0 BORDERLINE HIGH BLOOD PRESSURE: Primary | ICD-10-CM

## 2023-02-02 DIAGNOSIS — O13.9 GESTATIONAL HYPERTENSION, ANTEPARTUM: ICD-10-CM

## 2023-02-02 DIAGNOSIS — B00.9 HSV-1 INFECTION: ICD-10-CM

## 2023-02-02 DIAGNOSIS — R87.613 HGSIL (HIGH GRADE SQUAMOUS INTRAEPITHELIAL LESION) ON PAP SMEAR OF CERVIX: ICD-10-CM

## 2023-02-02 DIAGNOSIS — Z3A.38 38 WEEKS GESTATION OF PREGNANCY: ICD-10-CM

## 2023-02-02 DIAGNOSIS — J45.20 MILD INTERMITTENT ASTHMA WITHOUT COMPLICATION: ICD-10-CM

## 2023-02-02 DIAGNOSIS — R87.611 ATYPICAL SQUAMOUS CELLS CANNOT EXCLUDE HIGH GRADE SQUAMOUS INTRAEPITHELIAL LESION ON CYTOLOGIC SMEAR OF CERVIX (ASC-H): ICD-10-CM

## 2023-02-02 LAB
GLUCOSE UR STRIP-MCNC: NEGATIVE MG/DL
PROT UR STRIP-MCNC: NEGATIVE MG/DL

## 2023-02-02 PROCEDURE — 0502F SUBSEQUENT PRENATAL CARE: CPT | Performed by: STUDENT IN AN ORGANIZED HEALTH CARE EDUCATION/TRAINING PROGRAM

## 2023-02-02 NOTE — PROGRESS NOTES
OB follow up     Chief Complaint: PNC FU    Suha Raines is a 32 y.o.  38+6 being seen today for her obstetrical visit.  Denies RUQ pain, vision changes or HA . Had BPP before Appt     Review of Systems  No bleeding, No cramping/contractions     Wt 96.2 kg (212 lb)   LMP 2022 (LMP Unknown)   BMI 35.28 kg/m²     Vitals: VSS; AF    General Appearance:  Awake. Alert. Well developed. Well nourished. In no acute distress.    Visual Inspection: ° Abdomen was normal on visual inspection.  Palpation: ° Abdomen was soft. ° Abdominal non-tender.    Uterus: ° Fundal height was normal for gestational age. ° Not tender.  Uterine Adnexae: ° Normal without masses or tenderness.  Neurological:  ° Oriented to time, place, and person.  Skin:  ° General appearance was normal. No bruising or ecchymosis.  Obstetrical:   Presentation: VTX  Placenta: Posterior  MELSISA: 11.05cm  FCA: 130's    EFW  3597g 7.15# 69.6%   BPP 8/8    NST reactive > 20 minutes     SVE 3-4/60/-2    Assessment/Plan: Low risk pregnancy    1) Pregnancy at 38+6  VTX US  GBS Negative    2) ? CHTN: Pt has had elevated BP's prior to 20 weeks. Suspect pt has CHTN that has never been diagnosed. She denies a hx of CHTN. Pre  E labs normal. US today with EFW 61% and MELISSA 10cm. 24hr urine reassuring. Pt is on ASA daily  NST and BPP reassuring  CMP, CBC today ; reassuring  B/P at home normotensive   Growth today : EFW  3597g 7.15# 69.6%     3) Declines flu and tDap    4) s/p Covid vaccine    5) Asthma: IH at home for prn use.    Reviewed this stage of pregnancy  Problem list updated   F/u weekly,  IOL 6Feb23 for CHTN concern for GHTN?   Labor precaution today   Pre-e SF precautions     She is strongly against IOL. Discussed ACOG recommendation for hypertensive disorders of pregnancy. We discussed if she declines she risks harm to herself or fetus to include cerebral edema, pulmonary edema, seizure, end organ dysfunction,  Abruption, and/or maternal/fetal demise.    It is her right to decline the standard of care but I strongly recommend IOL by 38wga for GHTN. If CHTN by at least 39+6wga unless. In this complicated CHTN vs GHTN. B/P appears pt may have CHTN, whitecoat HTN in the office, as she is normotensive at home. We will see weekly until delivery or prn.     I spent 30 minutes caring for Suha  on this date of service. This time includes time spent by me in the following activities: preparing for the visit, reviewing tests, obtaining and/or reviewing a separately obtained history, performing a medically appropriate examination and/or evaluation, counseling and educating the patient/family/caregiver, ordering medications, tests, or procedures, documenting information in the medical record, independently interpreting results and communicating that information with the patient/family/caregiver and care coordination     Dylan Avila, DO    2/2/2023  16:20 EST

## 2023-02-06 ENCOUNTER — HOSPITAL ENCOUNTER (INPATIENT)
Facility: HOSPITAL | Age: 33
LOS: 2 days | Discharge: HOME | End: 2023-02-08
Attending: OBSTETRICS & GYNECOLOGY | Admitting: OBSTETRICS & GYNECOLOGY
Payer: COMMERCIAL

## 2023-02-06 ENCOUNTER — ANESTHESIA (OUTPATIENT)
Dept: OBSTETRICS AND GYNECOLOGY | Facility: HOSPITAL | Age: 33
End: 2023-02-06
Payer: COMMERCIAL

## 2023-02-06 ENCOUNTER — ANESTHESIA EVENT (OUTPATIENT)
Dept: OBSTETRICS AND GYNECOLOGY | Facility: HOSPITAL | Age: 33
End: 2023-02-06
Payer: COMMERCIAL

## 2023-02-06 ENCOUNTER — HOSPITAL ENCOUNTER (OUTPATIENT)
Dept: LABOR AND DELIVERY | Facility: HOSPITAL | Age: 33
Discharge: HOME | End: 2023-02-06
Payer: COMMERCIAL

## 2023-02-06 PROBLEM — R03.0 BORDERLINE HIGH BLOOD PRESSURE: Status: RESOLVED | Noted: 2022-11-04 | Resolved: 2023-02-06

## 2023-02-06 PROBLEM — Z87.42 HISTORY OF ABNORMAL CERVICAL PAPANICOLAOU SMEAR: Status: RESOLVED | Noted: 2021-09-13 | Resolved: 2023-02-06

## 2023-02-06 PROBLEM — R87.611 ATYPICAL SQUAMOUS CELLS CANNOT EXCLUDE HIGH GRADE SQUAMOUS INTRAEPITHELIAL LESION ON CYTOLOGIC SMEAR OF CERVIX (ASC-H): Status: RESOLVED | Noted: 2022-06-23 | Resolved: 2023-02-06

## 2023-02-06 PROBLEM — Z34.90 ENCOUNTER FOR PLANNED INDUCTION OF LABOR: Status: ACTIVE | Noted: 2023-02-06

## 2023-02-06 LAB
ABO GROUP BLD: NORMAL
ABO GROUP BLD: NORMAL
ALBUMIN SERPL-MCNC: 3.7 G/DL (ref 3.5–5.2)
ALBUMIN/GLOB SERPL: 1.3 G/DL
ALP SERPL-CCNC: 168 U/L (ref 39–117)
ALT SERPL W P-5'-P-CCNC: 13 U/L (ref 1–33)
AMPHET+METHAMPHET UR QL: NEGATIVE
AMPHETAMINES UR QL: NEGATIVE
ANION GAP SERPL CALCULATED.3IONS-SCNC: 13.8 MMOL/L (ref 5–15)
AST SERPL-CCNC: 20 U/L (ref 1–32)
BARBITURATES UR QL SCN: NEGATIVE
BENZODIAZ UR QL SCN: NEGATIVE
BILIRUB SERPL-MCNC: 0.6 MG/DL (ref 0–1.2)
BLD GP AB SCN SERPL QL: NEGATIVE
BUN SERPL-MCNC: 9 MG/DL (ref 6–20)
BUN/CREAT SERPL: 16.1 (ref 7–25)
BUPRENORPHINE SERPL-MCNC: NEGATIVE NG/ML
CALCIUM SPEC-SCNC: 8.8 MG/DL (ref 8.6–10.5)
CANNABINOIDS SERPL QL: NEGATIVE
CHLORIDE SERPL-SCNC: 104 MMOL/L (ref 98–107)
CO2 SERPL-SCNC: 20.2 MMOL/L (ref 22–29)
COCAINE UR QL: NEGATIVE
CREAT SERPL-MCNC: 0.56 MG/DL (ref 0.57–1)
DEPRECATED RDW RBC AUTO: 43.6 FL (ref 37–54)
EGFRCR SERPLBLD CKD-EPI 2021: 125.3 ML/MIN/1.73
ERYTHROCYTE [DISTWIDTH] IN BLOOD BY AUTOMATED COUNT: 12.1 % (ref 12.3–15.4)
GLOBULIN UR ELPH-MCNC: 2.8 GM/DL
GLUCOSE SERPL-MCNC: 101 MG/DL (ref 65–99)
HCT VFR BLD AUTO: 33 % (ref 34–46.6)
HGB BLD-MCNC: 11.5 G/DL (ref 12–15.9)
MCH RBC QN AUTO: 33.6 PG (ref 26.6–33)
MCHC RBC AUTO-ENTMCNC: 34.8 G/DL (ref 31.5–35.7)
MCV RBC AUTO: 96.5 FL (ref 79–97)
METHADONE UR QL SCN: NEGATIVE
OPIATES UR QL: NEGATIVE
OXYCODONE UR QL SCN: NEGATIVE
PCP UR QL SCN: NEGATIVE
PLATELET # BLD AUTO: 221 10*3/MM3 (ref 140–450)
PMV BLD AUTO: 10.5 FL (ref 6–12)
POTASSIUM SERPL-SCNC: 3.6 MMOL/L (ref 3.5–5.2)
PROPOXYPH UR QL: NEGATIVE
PROT SERPL-MCNC: 6.5 G/DL (ref 6–8.5)
RBC # BLD AUTO: 3.42 10*6/MM3 (ref 3.77–5.28)
RH BLD: POSITIVE
RH BLD: POSITIVE
SODIUM SERPL-SCNC: 138 MMOL/L (ref 136–145)
T&S EXPIRATION DATE: NORMAL
TRICYCLICS UR QL SCN: NEGATIVE
WBC NRBC COR # BLD: 7.49 10*3/MM3 (ref 3.4–10.8)

## 2023-02-06 PROCEDURE — 80053 COMPREHEN METABOLIC PANEL: CPT | Performed by: STUDENT IN AN ORGANIZED HEALTH CARE EDUCATION/TRAINING PROGRAM

## 2023-02-06 PROCEDURE — 3E033VJ INTRODUCTION OF OTHER HORMONE INTO PERIPHERAL VEIN, PERCUTANEOUS APPROACH: ICD-10-PCS | Performed by: STUDENT IN AN ORGANIZED HEALTH CARE EDUCATION/TRAINING PROGRAM

## 2023-02-06 PROCEDURE — 85027 COMPLETE CBC AUTOMATED: CPT | Performed by: STUDENT IN AN ORGANIZED HEALTH CARE EDUCATION/TRAINING PROGRAM

## 2023-02-06 PROCEDURE — 80306 DRUG TEST PRSMV INSTRMNT: CPT | Performed by: STUDENT IN AN ORGANIZED HEALTH CARE EDUCATION/TRAINING PROGRAM

## 2023-02-06 PROCEDURE — 10907ZC DRAINAGE OF AMNIOTIC FLUID, THERAPEUTIC FROM PRODUCTS OF CONCEPTION, VIA NATURAL OR ARTIFICIAL OPENING: ICD-10-PCS | Performed by: STUDENT IN AN ORGANIZED HEALTH CARE EDUCATION/TRAINING PROGRAM

## 2023-02-06 PROCEDURE — 86901 BLOOD TYPING SEROLOGIC RH(D): CPT | Performed by: STUDENT IN AN ORGANIZED HEALTH CARE EDUCATION/TRAINING PROGRAM

## 2023-02-06 PROCEDURE — 86901 BLOOD TYPING SEROLOGIC RH(D): CPT

## 2023-02-06 PROCEDURE — 25010000002 FENTANYL CITRATE (PF) 50 MCG/ML SOLUTION: Performed by: STUDENT IN AN ORGANIZED HEALTH CARE EDUCATION/TRAINING PROGRAM

## 2023-02-06 PROCEDURE — 25010000002 ONDANSETRON PER 1 MG

## 2023-02-06 PROCEDURE — 3E0DXGC INTRODUCTION OF OTHER THERAPEUTIC SUBSTANCE INTO MOUTH AND PHARYNX, EXTERNAL APPROACH: ICD-10-PCS | Performed by: STUDENT IN AN ORGANIZED HEALTH CARE EDUCATION/TRAINING PROGRAM

## 2023-02-06 PROCEDURE — 86850 RBC ANTIBODY SCREEN: CPT | Performed by: STUDENT IN AN ORGANIZED HEALTH CARE EDUCATION/TRAINING PROGRAM

## 2023-02-06 PROCEDURE — 86900 BLOOD TYPING SEROLOGIC ABO: CPT | Performed by: STUDENT IN AN ORGANIZED HEALTH CARE EDUCATION/TRAINING PROGRAM

## 2023-02-06 PROCEDURE — S0260 H&P FOR SURGERY: HCPCS | Performed by: STUDENT IN AN ORGANIZED HEALTH CARE EDUCATION/TRAINING PROGRAM

## 2023-02-06 PROCEDURE — 86900 BLOOD TYPING SEROLOGIC ABO: CPT

## 2023-02-06 RX ORDER — FENTANYL 0.2 MG/100ML-BUPIV 0.125%-NACL 0.9% EPIDURAL INJ 2/0.125 MCG/ML-%
SOLUTION INJECTION CONTINUOUS
Status: DISCONTINUED | OUTPATIENT
Start: 2023-02-06 | End: 2023-02-08 | Stop reason: HOSPADM

## 2023-02-06 RX ORDER — ONDANSETRON 2 MG/ML
INJECTION INTRAMUSCULAR; INTRAVENOUS
Status: COMPLETED
Start: 2023-02-06 | End: 2023-02-06

## 2023-02-06 RX ORDER — MISOPROSTOL 100 MCG
25 TABLET ORAL ONCE
Status: DISCONTINUED | OUTPATIENT
Start: 2023-02-06 | End: 2023-02-06

## 2023-02-06 RX ORDER — SODIUM CHLORIDE, SODIUM LACTATE, POTASSIUM CHLORIDE, CALCIUM CHLORIDE 600; 310; 30; 20 MG/100ML; MG/100ML; MG/100ML; MG/100ML
125 INJECTION, SOLUTION INTRAVENOUS CONTINUOUS
Status: DISCONTINUED | OUTPATIENT
Start: 2023-02-06 | End: 2023-02-08 | Stop reason: HOSPADM

## 2023-02-06 RX ORDER — BUPIVACAINE HYDROCHLORIDE 2.5 MG/ML
INJECTION, SOLUTION EPIDURAL; INFILTRATION; INTRACAUDAL AS NEEDED
Status: DISCONTINUED | OUTPATIENT
Start: 2023-02-06 | End: 2023-02-07 | Stop reason: SURG

## 2023-02-06 RX ORDER — OXYTOCIN/0.9 % SODIUM CHLORIDE 30/500 ML
2-20 PLASTIC BAG, INJECTION (ML) INTRAVENOUS
Status: DISCONTINUED | OUTPATIENT
Start: 2023-02-06 | End: 2023-02-07

## 2023-02-06 RX ORDER — ONDANSETRON 2 MG/ML
4 INJECTION INTRAMUSCULAR; INTRAVENOUS EVERY 8 HOURS PRN
Status: DISCONTINUED | OUTPATIENT
Start: 2023-02-06 | End: 2023-02-08 | Stop reason: HOSPADM

## 2023-02-06 RX ORDER — LIDOCAINE HYDROCHLORIDE AND EPINEPHRINE 15; 5 MG/ML; UG/ML
INJECTION, SOLUTION EPIDURAL AS NEEDED
Status: DISCONTINUED | OUTPATIENT
Start: 2023-02-06 | End: 2023-02-07 | Stop reason: SURG

## 2023-02-06 RX ORDER — ONDANSETRON 2 MG/ML
4 INJECTION INTRAMUSCULAR; INTRAVENOUS ONCE
Status: COMPLETED | OUTPATIENT
Start: 2023-02-06 | End: 2023-02-06

## 2023-02-06 RX ORDER — FENTANYL 0.2 MG/100ML-BUPIV 0.125%-NACL 0.9% EPIDURAL INJ 2/0.125 MCG/ML-%
SOLUTION INJECTION
Status: DISCONTINUED
Start: 2023-02-06 | End: 2023-02-07 | Stop reason: HOSPADM

## 2023-02-06 RX ORDER — OXYTOCIN/0.9 % SODIUM CHLORIDE 30/500 ML
2-20 PLASTIC BAG, INJECTION (ML) INTRAVENOUS
Status: DISCONTINUED | OUTPATIENT
Start: 2023-02-06 | End: 2023-02-06

## 2023-02-06 RX ORDER — LIDOCAINE HYDROCHLORIDE 10 MG/ML
5 INJECTION, SOLUTION EPIDURAL; INFILTRATION; INTRACAUDAL; PERINEURAL AS NEEDED
Status: DISCONTINUED | OUTPATIENT
Start: 2023-02-06 | End: 2023-02-07

## 2023-02-06 RX ORDER — FENTANYL CITRATE 50 UG/ML
100 INJECTION, SOLUTION INTRAMUSCULAR; INTRAVENOUS
Status: DISCONTINUED | OUTPATIENT
Start: 2023-02-06 | End: 2023-02-08 | Stop reason: HOSPADM

## 2023-02-06 RX ORDER — MISOPROSTOL 100 MCG
25 TABLET ORAL ONCE
Status: COMPLETED | OUTPATIENT
Start: 2023-02-06 | End: 2023-02-06

## 2023-02-06 RX ORDER — SODIUM CHLORIDE 0.9 % (FLUSH) 0.9 %
10 SYRINGE (ML) INJECTION AS NEEDED
Status: DISCONTINUED | OUTPATIENT
Start: 2023-02-06 | End: 2023-02-07

## 2023-02-06 RX ORDER — SODIUM CHLORIDE 0.9 % (FLUSH) 0.9 %
10 SYRINGE (ML) INJECTION EVERY 12 HOURS SCHEDULED
Status: DISCONTINUED | OUTPATIENT
Start: 2023-02-06 | End: 2023-02-07

## 2023-02-06 RX ORDER — EPHEDRINE SULFATE 5 MG/ML
10 INJECTION INTRAVENOUS
Status: DISCONTINUED | OUTPATIENT
Start: 2023-02-06 | End: 2023-02-07

## 2023-02-06 RX ORDER — FENTANYL 0.2 MG/100ML-BUPIV 0.125%-NACL 0.9% EPIDURAL INJ 2/0.125 MCG/ML-%
SOLUTION INJECTION
Status: COMPLETED
Start: 2023-02-06 | End: 2023-02-07

## 2023-02-06 RX ADMIN — LIDOCAINE HYDROCHLORIDE AND EPINEPHRINE 2 ML: 15; 5 INJECTION, SOLUTION EPIDURAL at 15:53

## 2023-02-06 RX ADMIN — OXYTOCIN-SODIUM CHLORIDE 0.9% IV SOLN 30 UNIT/500ML 2 MILLI-UNITS/MIN: 30-0.9/5 SOLUTION at 11:37

## 2023-02-06 RX ADMIN — SODIUM CHLORIDE, POTASSIUM CHLORIDE, SODIUM LACTATE AND CALCIUM CHLORIDE 125 ML/HR: 600; 310; 30; 20 INJECTION, SOLUTION INTRAVENOUS at 20:53

## 2023-02-06 RX ADMIN — LIDOCAINE HYDROCHLORIDE AND EPINEPHRINE 2 ML: 15; 5 INJECTION, SOLUTION EPIDURAL at 15:09

## 2023-02-06 RX ADMIN — Medication 10 ML/HR: at 15:53

## 2023-02-06 RX ADMIN — LIDOCAINE HYDROCHLORIDE AND EPINEPHRINE 3 ML: 15; 5 INJECTION, SOLUTION EPIDURAL at 15:05

## 2023-02-06 RX ADMIN — LIDOCAINE HYDROCHLORIDE AND EPINEPHRINE 3 ML: 15; 5 INJECTION, SOLUTION EPIDURAL at 15:48

## 2023-02-06 RX ADMIN — FENTANYL CITRATE 100 MCG: 50 INJECTION, SOLUTION INTRAMUSCULAR; INTRAVENOUS at 14:44

## 2023-02-06 RX ADMIN — EPHEDRINE SULFATE 10 MG: 5 INJECTION INTRAVENOUS at 15:13

## 2023-02-06 RX ADMIN — SODIUM CHLORIDE, POTASSIUM CHLORIDE, SODIUM LACTATE AND CALCIUM CHLORIDE 125 ML/HR: 600; 310; 30; 20 INJECTION, SOLUTION INTRAVENOUS at 13:00

## 2023-02-06 RX ADMIN — ONDANSETRON 4 MG: 2 INJECTION INTRAMUSCULAR; INTRAVENOUS at 15:15

## 2023-02-06 RX ADMIN — SODIUM CHLORIDE, POTASSIUM CHLORIDE, SODIUM LACTATE AND CALCIUM CHLORIDE 1000 ML: 600; 310; 30; 20 INJECTION, SOLUTION INTRAVENOUS at 14:45

## 2023-02-06 RX ADMIN — SODIUM CHLORIDE, POTASSIUM CHLORIDE, SODIUM LACTATE AND CALCIUM CHLORIDE 125 ML/HR: 600; 310; 30; 20 INJECTION, SOLUTION INTRAVENOUS at 05:50

## 2023-02-06 RX ADMIN — BUPIVACAINE HYDROCHLORIDE 5 ML: 2.5 INJECTION, SOLUTION EPIDURAL; INFILTRATION; INTRACAUDAL; PERINEURAL at 22:00

## 2023-02-06 RX ADMIN — Medication 25 MCG: at 07:19

## 2023-02-06 NOTE — ANESTHESIA PROCEDURE NOTES
Labor Epidural    Pre-sedation assessment completed: 2/6/2023 2:50 PM    Patient reassessed immediately prior to procedure    Patient location during procedure: OB  Start Time: 2/6/2023 2:55 PM  Stop Time: 2/6/2023 3:07 PM  Performed By  Anesthesiologist: Rina Wolfe MDSRNA: Patrick Arias SRNA  Preanesthetic Checklist  Completed: patient identified, IV checked, site marked, risks and benefits discussed, surgical consent, monitors and equipment checked, pre-op evaluation and timeout performed  Prep:  Pt Position:sitting  Sterile Tech:cap, gloves, mask and sterile barrier  Prep:povidone-iodine 7.5% surgical scrub  Monitoring:blood pressure monitoring, continuous pulse oximetry and EKG (FHTs)  Epidural Block Procedure:  Approach:midline  Guidance:landmark technique and palpation technique  Location:L4-L5  Needle Type:Tuohy  Needle Gauge:17 G  Loss of Resistance Medium: saline  Loss of Resistance: 8cm  Catheter at skin depth (cm): threaded 3 cm.  Paresthesia: left and transient  Aspiration:negative  Test Dose:negative  Number of Attempts: 1  Post Assessment:  Dressing:occlusive dressing applied and secured with tape  Pt Tolerance:patient tolerated the procedure well with no apparent complications  Complications:no

## 2023-02-06 NOTE — ANESTHESIA PREPROCEDURE EVALUATION
Anesthesia Evaluation     Patient summary reviewed and Nursing notes reviewed   no history of anesthetic complications:  NPO Solid Status: Waived due to emergency  NPO Liquid Status: Waived due to emergency           Airway   Mallampati: I  TM distance: >3 FB  Neck ROM: full  No difficulty expected  Dental - normal exam     Pulmonary - normal exam    breath sounds clear to auscultation  (+) asthma,  Cardiovascular - normal exam    Rhythm: regular  Rate: normal        Neuro/Psych  GI/Hepatic/Renal/Endo      Musculoskeletal     Abdominal    Substance History      OB/GYN    (+) Pregnant, pregnancy induced hypertension        Other                        Anesthesia Plan    ASA 2     epidural     (Late entry, Patient seen and examined prior to procedure.)    Anesthetic plan, risks, benefits, and alternatives have been provided, discussed and informed consent has been obtained with: patient and spouse/significant other.        CODE STATUS:

## 2023-02-06 NOTE — PLAN OF CARE
Problem: Adult Inpatient Plan of Care  Goal: Plan of Care Review  Outcome: Ongoing, Progressing  Goal: Patient-Specific Goal (Individualized)  Outcome: Ongoing, Progressing  Goal: Absence of Hospital-Acquired Illness or Injury  Outcome: Ongoing, Progressing  Intervention: Identify and Manage Fall Risk  Recent Flowsheet Documentation  Taken 2/6/2023 1534 by Maegan Bearden RN  Safety Promotion/Fall Prevention: safety round/check completed  Taken 2/6/2023 0722 by Maegan Bearden RN  Safety Promotion/Fall Prevention: safety round/check completed  Intervention: Prevent Skin Injury  Recent Flowsheet Documentation  Taken 2/6/2023 0722 by Maegan Bearden RN  Body Position: sitting up in bed  Intervention: Prevent and Manage VTE (Venous Thromboembolism) Risk  Recent Flowsheet Documentation  Taken 2/6/2023 1534 by Maegan Bearden RN  Activity Management: bedrest  Taken 2/6/2023 0722 by Maegan Bearden RN  Activity Management: up ad richa  Goal: Optimal Comfort and Wellbeing  Outcome: Ongoing, Progressing  Intervention: Provide Person-Centered Care  Recent Flowsheet Documentation  Taken 2/6/2023 0722 by Maegan Bearden RN  Trust Relationship/Rapport:   choices provided   care explained   questions answered   questions encouraged  Goal: Readiness for Transition of Care  Outcome: Ongoing, Progressing   Goal Outcome Evaluation:

## 2023-02-06 NOTE — PROGRESS NOTES
"LABOR PROGRESS NOTE    S:  Pt comfortable with epidural.      O:  /66   Pulse 52   Temp 97.7 °F (36.5 °C) (Oral)   Resp 18   Ht 165.1 cm (65\")   Wt 96.2 kg (212 lb)   LMP 2022 (LMP Unknown)   SpO2 97%   Breastfeeding Yes   BMI 35.28 kg/m²     FHTs R w/ occ moderate variable and early decel.   Cat II    Cxts q 2-3 mins on pitocin.    Cx:  /-3    Attempted IUPC placement without success;  Resistance met with bloody return.    A:  IOL #1 for GHTN, term (39w3d); stable fetus, arrest of dilatation.     P:  Pt really doesn't want a C/S.  Since MVUs are not calculable, will recheck in 2 hrs and reevaluate with a plan.  I counseled pt and her significant other/family member ref: indications for a  delivery including but not limited to 1. Maternal intolerance to labor, 2. Fetal intolerance to labor or fetal distress, 3. Failed induction, CPD or dystocia.  Pt and her family verbalized their understanding.     Nav Pastrana MD  18:33 EST  23      "

## 2023-02-06 NOTE — PROGRESS NOTES
4/60/-2. AROM clear. On pitocin. Considering IV pain medication vs Nitrous vs epidural. Continue IOL. FWB with CAT 1 tracing.   Anticipate SCD; operative as indicated

## 2023-02-06 NOTE — H&P
Patient Care Team:  Provider, No Known as PCP - General  Sravanthi Obando MD as Consulting Physician (Obstetrics and Gynecology)  Tayo Carrasco MD as Consulting Physician (Obstetrics and Gynecology)  Nav Pastrana MD as Consulting Physician (Obstetrics and Gynecology)  Noreen Taylor APRN as Nurse Practitioner (Obstetrics and Gynecology)    Chief complaint IOL for GHTN vs CHTN        HPI: 31yo  @ 39+3 presents for IOL. Denies labor complaints or s/s of Pre-e SF.     ROS:   Constitutional: Weight change and appetite change present.   Respiratory: Negative for cough and shortness of breath.    Cardiovascular: Negative for chest pain and palpitations.   Gastrointestinal: Negative   Endocrine: Negative.    Genitourinary: + missed menses, no dysuria   Skin: Negative.    Neurological: Negative for dizziness and headaches.   Hematological: Negative.    Psychiatric/Behavioral: Negative for dysphoric mood and sleep disturbance. The patient is not nervous/anxious.        PMH:   Past Medical History:   Diagnosis Date   • Abdominal cramps    • Abnormal Pap smear of cervix    • Asthma    • Gestational hypertension, antepartum 2023   • Varicella    • Weight loss          PSH:   Past Surgical History:   Procedure Laterality Date   • TONSILLECTOMY     • WISDOM TOOTH EXTRACTION         SoHx:   Social History     Socioeconomic History   • Marital status:    Tobacco Use   • Smoking status: Never   • Smokeless tobacco: Never   Substance and Sexual Activity   • Alcohol use: Not Currently     Comment: casual   • Drug use: Never   • Sexual activity: Yes     Partners: Male     Birth control/protection: I.U.D.     Comment: ParaGu- 2014       FHx:   Family History   Problem Relation Age of Onset   • No Known Problems Mother    • No Known Problems Father    • Breast cancer Neg Hx    • Ovarian cancer Neg Hx    • Colon cancer Neg Hx    • Uterine cancer Neg Hx        PGyn Hx:  See office notes     POBHx: G1   2Feb23   EFW  3597g 7.15# 69.6%   BPP     Allergies: Patient has no known allergies.    Medications:   No current facility-administered medications on file prior to encounter.     Current Outpatient Medications on File Prior to Encounter   Medication Sig Dispense Refill   • aspirin 81 MG oral suspension      • Biotin 5000 MCG tablet      • Magnesium 500 MG capsule Daily.     • Omega-3 Fatty Acids (fish oil) 1000 MG capsule capsule Daily.     • Prenatal Vit-Fe Fumarate-FA (prenatal vitamin 27-0.8) 27-0.8 MG tablet tablet Take  by mouth Daily.     • Zinc 30 MG capsule Take  by mouth.     • albuterol sulfate  (90 Base) MCG/ACT inhaler INHALE 2 PUFFS PO Q 6 H PRN     • fexofenadine (ALLEGRA) 180 MG tablet      • vitamin B-12 (CYANOCOBALAMIN) 1000 MCG tablet See Admin Instructions.               Vital Signs  Temp:  [97.7 °F (36.5 °C)-98.1 °F (36.7 °C)] 97.7 °F (36.5 °C)  Heart Rate:  [75-83] 78  Resp:  [16-20] 16  BP: (123-125)/(58-83) 125/81    Physical Exam:  Constitutional: She is oriented to person, place, and time. She appears well-developed and well-nourished.   HENT:   Head: Normocephalic and atraumatic.   Cardiovascular: Normal rate and regular rhythm.    Pulmonary/Chest: Effort normal. No respiratory distress.   Abdominal: Soft. Bowel sounds are normal. There is no tenderness. There is no rebound and no guarding.   Musculoskeletal: Normal range of motion.   Neurological: She is alert and oriented to person, place, and time.   Skin: Skin is warm and dry.   Psychiatric: She has a normal mood and affect. Her behavior is normal. Judgment and thought content normal.   Nursing note and vitals reviewed.  Debilities/Disabilities Identified: None  Emotional Behavior: Appropriate    Labs: 11.5/33.0   CMP pending       Assessment/Plan:   33yo  at 39+3 presents for IOL 2/2 GHTN vs CHTN .  ANC complicated by GHTN/CHTN, Asthma.  Maternal VSS WNL.   FWB demonstrated by  reactive cat I NST.  SVE 3/60/-3_.  Cephalic by ARELY.  EFW 8# by leopolds;.  Anticipate .    -Admit to L&D.  Consent signed and on chart.  -PIV, LR.  -T&S, CBC, CMP   -Anesthesia consult.    -GBS neg ; prophylaxis not indicated.  -MBT A+; rhophylac not indicated.  -Rubella Imm; MMR not indicated.  -Induce via cytotec/FB and pit/amniotomy as indicated.  -CD for maternal/fetal indications.  -Mg+ as indicated; HTN medications if indicated     Consent for Vaginal Delivery  Pt. counseled as to risk of labor and delivery including but not limited to infection, bleeding (with possible need for blood transfusion, and its inherent risks of virus transmission, i.e. HIV, Hepatitis; possible transfusion reaction), possible emergent  section with its risks of damage to internal organs and necessary repairs, possible operative vaginal delivery, NON routine use of episiotomy and routine use of internal monitors and associated risks, anesthetic complications, injury to infant or mother at time of delivery, maternal or fetal death.  The pt. understands these risks and is willing to proceed.  All of her questions were answered.    Induction of Labor  Risks have been outlined to include (but not limited to) infection, uterine tetany with uterine rupture and/or fetal distress, need for emergent  delivery and possible  hysterectomy, possible postpartum hemorrhage secondary to uterine atony which could be substantial enough to require blood transfusion with the inherent risks of hepatitis, AIDS, HIV infection as well as transfusion reaction. She understands the need for use of this induction method and desires to proceed. There is no evidence of cephalopelvic disproportion or fetal distress, or any contraindication to the induction technique selected.    I discussed the patients findings and my recommendations with patient.         Dylan Avila DO  23  07:22 EST

## 2023-02-06 NOTE — ANESTHESIA PROCEDURE NOTES
Labor Epidural    Pre-sedation assessment completed: 2/6/2023 3:40 PM    Patient reassessed immediately prior to procedure    Patient location during procedure: OB  Start Time: 2/6/2023 3:42 PM  Stop Time: 2/6/2023 3:49 PM  Performed By  Anesthesiologist: Rina Wolfe MD  Preanesthetic Checklist  Completed: patient identified, IV checked, site marked, risks and benefits discussed, surgical consent, monitors and equipment checked, pre-op evaluation and timeout performed  Additional Notes  Epidural replaced as one sided.  First epicath removed intact.  Prep:  Pt Position:sitting  Sterile Tech:cap, gloves, mask and sterile barrier  Prep:chlorhexidine gluconate and isopropyl alcohol  Monitoring:blood pressure monitoring, continuous pulse oximetry and EKG (FHTs)  Epidural Block Procedure:  Approach:midline  Guidance:landmark technique and palpation technique  Location:L4-L5  Needle Type:Tuohy  Needle Gauge:17 G  Loss of Resistance Medium: saline  Loss of Resistance: 9cm  Catheter at skin depth (cm): catheter threaded 3 cm.  Paresthesia: none  Aspiration:negative  Test Dose:negative  Number of Attempts: 1  Post Assessment:  Dressing:occlusive dressing applied and secured with tape  Pt Tolerance:patient tolerated the procedure well with no apparent complications  Complications:no

## 2023-02-06 NOTE — PROGRESS NOTES
3/60/-2. Stripped membranes. Will re check in 1-2 hours. Cytotec has been give. Wants to avoid cook unless needed. Pitocin per protocol after 4 hours from cytotec. Anticipate amniotomy. Epidural when desires.   Anticipate ; operative as indicated   FWB with CAT 1 tracing

## 2023-02-07 PROBLEM — O13.9 GESTATIONAL HYPERTENSION, ANTEPARTUM: Status: RESOLVED | Noted: 2023-02-02 | Resolved: 2023-02-07

## 2023-02-07 PROBLEM — I10 WHITE COAT SYNDROME WITH DIAGNOSIS OF HYPERTENSION: Status: ACTIVE | Noted: 2023-02-07

## 2023-02-07 PROBLEM — Z34.90 PREGNANCY: Status: RESOLVED | Noted: 2022-07-28 | Resolved: 2023-02-07

## 2023-02-07 LAB
HCT VFR BLD AUTO: 29.6 % (ref 34–46.6)
HGB BLD-MCNC: 10.3 G/DL (ref 12–15.9)

## 2023-02-07 PROCEDURE — 59400 OBSTETRICAL CARE: CPT | Performed by: OBSTETRICS & GYNECOLOGY

## 2023-02-07 PROCEDURE — 85014 HEMATOCRIT: CPT | Performed by: NURSE PRACTITIONER

## 2023-02-07 PROCEDURE — 85018 HEMOGLOBIN: CPT | Performed by: NURSE PRACTITIONER

## 2023-02-07 PROCEDURE — 94799 UNLISTED PULMONARY SVC/PX: CPT

## 2023-02-07 PROCEDURE — 25010000002 ONDANSETRON PER 1 MG: Performed by: OBSTETRICS & GYNECOLOGY

## 2023-02-07 RX ORDER — ACETAMINOPHEN 500 MG
1000 TABLET ORAL ONCE AS NEEDED
Status: COMPLETED | OUTPATIENT
Start: 2023-02-07 | End: 2023-02-07

## 2023-02-07 RX ORDER — SODIUM CHLORIDE 0.9 % (FLUSH) 0.9 %
1-10 SYRINGE (ML) INJECTION AS NEEDED
Status: DISCONTINUED | OUTPATIENT
Start: 2023-02-07 | End: 2023-02-08 | Stop reason: HOSPADM

## 2023-02-07 RX ORDER — BISACODYL 10 MG
10 SUPPOSITORY, RECTAL RECTAL DAILY PRN
Status: DISCONTINUED | OUTPATIENT
Start: 2023-02-08 | End: 2023-02-08 | Stop reason: HOSPADM

## 2023-02-07 RX ORDER — HYDROCODONE BITARTRATE AND ACETAMINOPHEN 5; 325 MG/1; MG/1
2 TABLET ORAL EVERY 4 HOURS PRN
Status: DISCONTINUED | OUTPATIENT
Start: 2023-02-07 | End: 2023-02-08 | Stop reason: HOSPADM

## 2023-02-07 RX ORDER — CARBOPROST TROMETHAMINE 250 UG/ML
250 INJECTION, SOLUTION INTRAMUSCULAR
Status: DISCONTINUED | OUTPATIENT
Start: 2023-02-07 | End: 2023-02-08 | Stop reason: HOSPADM

## 2023-02-07 RX ORDER — ONDANSETRON 2 MG/ML
4 INJECTION INTRAMUSCULAR; INTRAVENOUS EVERY 6 HOURS PRN
Status: DISCONTINUED | OUTPATIENT
Start: 2023-02-07 | End: 2023-02-08 | Stop reason: HOSPADM

## 2023-02-07 RX ORDER — PRENATAL VIT/IRON FUM/FOLIC AC 27MG-0.8MG
1 TABLET ORAL DAILY
Status: DISCONTINUED | OUTPATIENT
Start: 2023-02-07 | End: 2023-02-08 | Stop reason: HOSPADM

## 2023-02-07 RX ORDER — METHYLERGONOVINE MALEATE 0.2 MG/ML
200 INJECTION INTRAVENOUS ONCE AS NEEDED
Status: DISCONTINUED | OUTPATIENT
Start: 2023-02-07 | End: 2023-02-08 | Stop reason: HOSPADM

## 2023-02-07 RX ORDER — MISOPROSTOL 200 UG/1
800 TABLET ORAL ONCE AS NEEDED
Status: DISCONTINUED | OUTPATIENT
Start: 2023-02-07 | End: 2023-02-08 | Stop reason: HOSPADM

## 2023-02-07 RX ORDER — HYDROCORTISONE 25 MG/G
1 CREAM TOPICAL AS NEEDED
Status: DISCONTINUED | OUTPATIENT
Start: 2023-02-07 | End: 2023-02-08 | Stop reason: HOSPADM

## 2023-02-07 RX ORDER — OXYTOCIN/0.9 % SODIUM CHLORIDE 30/500 ML
999 PLASTIC BAG, INJECTION (ML) INTRAVENOUS ONCE
Status: DISCONTINUED | OUTPATIENT
Start: 2023-02-07 | End: 2023-02-08 | Stop reason: HOSPADM

## 2023-02-07 RX ORDER — LIDOCAINE HYDROCHLORIDE 10 MG/ML
INJECTION, SOLUTION EPIDURAL; INFILTRATION; INTRACAUDAL; PERINEURAL
Status: COMPLETED
Start: 2023-02-07 | End: 2023-02-07

## 2023-02-07 RX ORDER — MISOPROSTOL 200 UG/1
TABLET ORAL
Status: DISPENSED
Start: 2023-02-07 | End: 2023-02-07

## 2023-02-07 RX ORDER — DOCUSATE SODIUM 100 MG/1
100 CAPSULE, LIQUID FILLED ORAL 2 TIMES DAILY
Status: DISCONTINUED | OUTPATIENT
Start: 2023-02-07 | End: 2023-02-08 | Stop reason: HOSPADM

## 2023-02-07 RX ORDER — ACETAMINOPHEN 500 MG
1000 TABLET ORAL EVERY 6 HOURS PRN
Status: DISCONTINUED | OUTPATIENT
Start: 2023-02-07 | End: 2023-02-08 | Stop reason: HOSPADM

## 2023-02-07 RX ORDER — OXYTOCIN/0.9 % SODIUM CHLORIDE 30/500 ML
250 PLASTIC BAG, INJECTION (ML) INTRAVENOUS CONTINUOUS
Status: ACTIVE | OUTPATIENT
Start: 2023-02-07 | End: 2023-02-07

## 2023-02-07 RX ORDER — IBUPROFEN 800 MG/1
800 TABLET ORAL EVERY 8 HOURS PRN
Status: DISCONTINUED | OUTPATIENT
Start: 2023-02-07 | End: 2023-02-08 | Stop reason: HOSPADM

## 2023-02-07 RX ADMIN — HYDROCODONE BITARTRATE AND ACETAMINOPHEN 2 TABLET: 5; 325 TABLET ORAL at 06:13

## 2023-02-07 RX ADMIN — Medication: at 12:39

## 2023-02-07 RX ADMIN — IBUPROFEN 800 MG: 800 TABLET, FILM COATED ORAL at 17:49

## 2023-02-07 RX ADMIN — ACETAMINOPHEN 1000 MG: 500 TABLET, FILM COATED ORAL at 21:27

## 2023-02-07 RX ADMIN — LIDOCAINE HYDROCHLORIDE 5 ML: 10 INJECTION, SOLUTION EPIDURAL; INFILTRATION; INTRACAUDAL; PERINEURAL at 01:24

## 2023-02-07 RX ADMIN — HYDROCORTISONE 1 APPLICATION: 25 CREAM TOPICAL at 16:45

## 2023-02-07 RX ADMIN — ACETAMINOPHEN 1000 MG: 500 TABLET, FILM COATED ORAL at 15:23

## 2023-02-07 RX ADMIN — IBUPROFEN 800 MG: 800 TABLET, FILM COATED ORAL at 02:29

## 2023-02-07 RX ADMIN — IBUPROFEN 800 MG: 800 TABLET, FILM COATED ORAL at 10:03

## 2023-02-07 RX ADMIN — WITCH HAZEL 4 PAD: 500 SOLUTION RECTAL; TOPICAL at 06:15

## 2023-02-07 RX ADMIN — PRENATAL VIT W/ FE FUMARATE-FA TAB 27-0.8 MG 1 TABLET: 27-0.8 TAB at 08:33

## 2023-02-07 RX ADMIN — DOCUSATE SODIUM 100 MG: 100 CAPSULE ORAL at 08:33

## 2023-02-07 RX ADMIN — OXYTOCIN-SODIUM CHLORIDE 0.9% IV SOLN 30 UNIT/500ML 250 ML/HR: 30-0.9/5 SOLUTION at 01:54

## 2023-02-07 RX ADMIN — ACETAMINOPHEN 1000 MG: 500 TABLET, FILM COATED ORAL at 01:06

## 2023-02-07 RX ADMIN — DOCUSATE SODIUM 100 MG: 100 CAPSULE ORAL at 21:14

## 2023-02-07 RX ADMIN — ONDANSETRON 4 MG: 2 INJECTION INTRAMUSCULAR; INTRAVENOUS at 00:55

## 2023-02-07 RX ADMIN — Medication: at 06:15

## 2023-02-07 NOTE — PLAN OF CARE
Goal Outcome Evaluation:  Plan of Care Reviewed With: patient, spouse        Progress: improving  Outcome Evaluation: Induced vaginal delivery today 2/7 at 0118 after 18 minutes of pushing. Complication of maternal fever 102.1F. . VSS. Fundus firm midline at U w/ light-scant rubra lochia. Pt has a medial episiotomy with a 2nd degree laceration that was repaired. Swelling noted and icepacks w/ tucks & dermaplast utilized. Pt up to BR x1 following delivery assisted by 2 RNs and wheelchair. Pt states pain is controlled with PO motrion. Breastfeeding with latch and positional support. Bonding well with baby.        Problem: Adult Inpatient Plan of Care  Goal: Plan of Care Review  2/7/2023 0440 by Yarelis Rush RN  Outcome: Ongoing, Progressing  Flowsheets  Taken 2/7/2023 0440  Progress: improving  Outcome Evaluation: Induced vaginal delivery today 2/7 at 0118 after 18 minutes of pushing. Complication of maternal fever 102.1F. . VSS. Fundus firm midline at U w/ light-scant rubra lochia. Pt has a medial episiotomy with a 2nd degree laceration that was repaired. Swelling noted and icepacks w/ tucks & dermaplast utilized. Pt up to BR x1 following delivery assisted by 2 RNs and wheelchair. Pt states pain is controlled with PO motrion. Breastfeeding with latch and positional support. Bonding well with baby.  Taken 2/7/2023 0438  Plan of Care Reviewed With:   patient   spouse  2/7/2023 0438 by Yarelis Rush RN  Outcome: Ongoing, Progressing  Flowsheets (Taken 2/7/2023 0438)  Progress: improving  Plan of Care Reviewed With:   patient   spouse  Goal: Patient-Specific Goal (Individualized)  2/7/2023 0440 by Yarelis Rush, SERVANDO  Outcome: Ongoing, Progressing  Flowsheets (Taken 2/7/2023 0438)  Individualized Care Needs: Pt would like to use tucks pads and dermaplast spray postpartum  2/7/2023 0438 by Yarelis Rush, SERVANDO  Outcome: Ongoing, Progressing  Flowsheets (Taken 2/7/2023 0438)  Individualized Care Needs: Pt would  like to use tucks pads and dermaplast spray postpartum  Goal: Absence of Hospital-Acquired Illness or Injury  2/7/2023 0440 by Yarelis Rush RN  Outcome: Ongoing, Progressing  2/7/2023 0438 by Yarelis Rush RN  Outcome: Ongoing, Progressing  Intervention: Identify and Manage Fall Risk  Recent Flowsheet Documentation  Taken 2/6/2023 2300 by Yarelis Rush RN  Safety Promotion/Fall Prevention: safety round/check completed  Taken 2/6/2023 2230 by Yarelis Rush RN  Safety Promotion/Fall Prevention: safety round/check completed  Taken 2/6/2023 2200 by Yarelis Rush RN  Safety Promotion/Fall Prevention: safety round/check completed  Taken 2/6/2023 2130 by Yarelis Rush RN  Safety Promotion/Fall Prevention: safety round/check completed  Taken 2/6/2023 2100 by Yarelis Rush RN  Safety Promotion/Fall Prevention: safety round/check completed  Taken 2/6/2023 2030 by Yarelis Rush RN  Safety Promotion/Fall Prevention: safety round/check completed  Taken 2/6/2023 2000 by Yarelis Rush RN  Safety Promotion/Fall Prevention: safety round/check completed  Taken 2/6/2023 1930 by Yarelis Rush RN  Safety Promotion/Fall Prevention: safety round/check completed  Intervention: Prevent Skin Injury  Recent Flowsheet Documentation  Taken 2/7/2023 0130 by Yarelis Rush RN  Body Position: sitting up in bed  Intervention: Prevent and Manage VTE (Venous Thromboembolism) Risk  Recent Flowsheet Documentation  Taken 2/7/2023 0330 by Yarelis Rush RN  Activity Management: (wheelchair up to BR)   sitting, edge of bed   standing at bedside   other (see comments)  Taken 2/7/2023 0130 by Yarelis Rush RN  Activity Management: activity adjusted per tolerance  Intervention: Prevent Infection  Recent Flowsheet Documentation  Taken 2/7/2023 0130 by Yarelis Rush RN  Infection Prevention:   cohorting utilized   environmental surveillance performed   hand hygiene promoted   equipment surfaces disinfected   personal protective equipment  utilized   rest/sleep promoted   visitors restricted/screened   single patient room provided  Taken 2/6/2023 1930 by Yarelis Rush RN  Infection Prevention:   cohorting utilized   environmental surveillance performed   equipment surfaces disinfected   hand hygiene promoted   personal protective equipment utilized   rest/sleep promoted   single patient room provided   visitors restricted/screened  Goal: Optimal Comfort and Wellbeing  2/7/2023 0440 by Yarelis Rush RN  Outcome: Ongoing, Progressing  2/7/2023 0438 by Yarelis Rush RN  Outcome: Ongoing, Progressing  Intervention: Monitor Pain and Promote Comfort  Recent Flowsheet Documentation  Taken 2/7/2023 0230 by Yarelis Rush RN  Pain Management Interventions: see MAR  Taken 2/6/2023 2200 by Yarelis Rush RN  Pain Management Interventions: (Yovany V at bedside w/ bolus) other (see comments)  Taken 2/6/2023 2145 by Yarelis Rush RN  Pain Management Interventions: (Yovany V called to bedside for bolus) other (see comments)  Taken 2/6/2023 1930 by Yarelis Rush RN  Pain Management Interventions: (anesthesia Yovany at bedside) other (see comments)  Intervention: Provide Person-Centered Care  Recent Flowsheet Documentation  Taken 2/7/2023 0130 by Yarelis Rush RN  Trust Relationship/Rapport:   care explained   choices provided  Taken 2/6/2023 1930 by Yarelis Rush RN  Trust Relationship/Rapport:   choices provided   care explained   emotional support provided   empathic listening provided   questions answered   questions encouraged   thoughts/feelings acknowledged   reassurance provided  Goal: Readiness for Transition of Care  2/7/2023 0440 by Yarelis Rush RN  Outcome: Ongoing, Progressing  2/7/2023 0438 by Yarelis Rush RN  Outcome: Ongoing, Progressing     Problem: Bleeding (Labor)  Goal: Hemostasis  2/7/2023 0440 by Yarelis Rush RN  Outcome: Ongoing, Progressing  2/7/2023 0438 by Yarelis Rush RN  Outcome: Ongoing, Progressing     Problem: Change in  Fetal Wellbeing (Labor)  Goal: Stable Fetal Wellbeing  2/7/2023 0440 by Yarelis Rush RN  Outcome: Ongoing, Progressing  2/7/2023 0438 by Yarelis Rush RN  Outcome: Ongoing, Progressing  Intervention: Promote and Monitor Fetal Wellbeing  Recent Flowsheet Documentation  Taken 2/7/2023 0130 by Yarelis Rush RN  Body Position: sitting up in bed     Problem: Delayed Labor Progression (Labor)  Goal: Effective Progression to Delivery  2/7/2023 0440 by Yarelis Rush RN  Outcome: Ongoing, Progressing  2/7/2023 0438 by Yarelis Rush RN  Outcome: Ongoing, Progressing     Problem: Infection (Labor)  Goal: Absence of Infection Signs and Symptoms  2/7/2023 0440 by Yarelis Rush RN  Outcome: Ongoing, Progressing  2/7/2023 0438 by Yarelis Rush RN  Outcome: Ongoing, Progressing  Intervention: Prevent or Manage Infection  Recent Flowsheet Documentation  Taken 2/7/2023 0130 by Yarelis Rush RN  Infection Prevention:   cohorting utilized   environmental surveillance performed   hand hygiene promoted   equipment surfaces disinfected   personal protective equipment utilized   rest/sleep promoted   visitors restricted/screened   single patient room provided  Taken 2/6/2023 1930 by Yarelis Rush RN  Infection Prevention:   cohorting utilized   environmental surveillance performed   equipment surfaces disinfected   hand hygiene promoted   personal protective equipment utilized   rest/sleep promoted   single patient room provided   visitors restricted/screened     Problem: Labor Pain (Labor)  Goal: Acceptable Pain Control  2/7/2023 0440 by Yarelis Rush RN  Outcome: Ongoing, Progressing  2/7/2023 0438 by Yarelis Rush RN  Outcome: Ongoing, Progressing     Problem: Uterine Tachysystole (Labor)  Goal: Normal Uterine Contraction Pattern  2/7/2023 0440 by Yarelis Rush RN  Outcome: Ongoing, Progressing  2/7/2023 0438 by Yarelis Rush RN  Outcome: Ongoing, Progressing     Problem: Adjustment to Role Transition (Postpartum Vaginal  Delivery)  Goal: Successful Maternal Role Transition  Outcome: Ongoing, Progressing     Problem: Bleeding (Postpartum Vaginal Delivery)  Goal: Hemostasis  Outcome: Ongoing, Progressing     Problem: Infection (Postpartum Vaginal Delivery)  Goal: Absence of Infection Signs/Symptoms  Outcome: Ongoing, Progressing     Problem: Pain (Postpartum Vaginal Delivery)  Goal: Acceptable Pain Control  Outcome: Ongoing, Progressing  Intervention: Prevent or Manage Pain  Recent Flowsheet Documentation  Taken 2/7/2023 0230 by Yarelis Rush RN  Pain Management Interventions: see MAR  Taken 2/6/2023 2200 by Yarelis Rush RN  Pain Management Interventions: (Yovany V at bedside w/ bolus) other (see comments)  Taken 2/6/2023 2145 by Yarelis Rush RN  Pain Management Interventions: (Yovany V called to bedside for bolus) other (see comments)  Taken 2/6/2023 1930 by Yarelis Rush RN  Pain Management Interventions: (anesthesia Yovany at bedside) other (see comments)     Problem: Urinary Retention (Postpartum Vaginal Delivery)  Goal: Effective Urinary Elimination  Outcome: Ongoing, Progressing

## 2023-02-07 NOTE — PLAN OF CARE
Problem: Adult Inpatient Plan of Care  Goal: Plan of Care Review  Outcome: Ongoing, Progressing  Flowsheets (Taken 2/7/2023 1619)  Progress: improving  Plan of Care Reviewed With: patient  Outcome Evaluation: VSS, pain well controlled with ordered meds, fundus and lochia wnl.     Problem: Adult Inpatient Plan of Care  Goal: Patient-Specific Goal (Individualized)  Outcome: Ongoing, Progressing  Flowsheets (Taken 2/7/2023 1619)  Individualized Care Needs: pt would like to pump and give breastmilk through bottle   Goal Outcome Evaluation:  Plan of Care Reviewed With: patient        Progress: improving  Outcome Evaluation: VSS, pain well controlled with ordered meds, fundus and lochia wnl.

## 2023-02-07 NOTE — L&D DELIVERY NOTE
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     Delivery     Delivery: Vaginal, Spontaneous     YOB: 2022    Time of Birth:  Gestational Age 1:18 AM   39w4d     Anesthesia: Epidural;Local     Delivering clinician:     Forceps?   No   Vacuum? No    Shoulder dystocia present: No        Delivery narrative:    DELIVERY NOTE  32 y.o.  @ 39w4d    Pt admitted for IOL for concern about GHTN.   Pt had AROM, epidural placed, and pt progressed with a protracted active phase till she was completely dilated.     Pt pushed with reactive fetal heart tones till she was C/C/+3; was prepped and draped in the usual fashion.      Pt was delivered of 1 live viable male, from the FILIBERTO position, over a second degree episiotomy.  Infant was bulb suctioned on the perineum.  There was a loose nuchal cord.  After uneventful delivery of shoulders, infant was completely delivered and placed on maternal chest for kangaroo care.  Cord was doubly clamped and divided and cord blood drawn.  Placenta was delivered spontaneously, intact with 3 vessel cord.  Lacs:  2nd degree episiotomy.  Repairs yes.  Pt tolerated procedure well and stayed in LDR in satisfactory condition.  All sponge, instrument and needle counts were correct x 3 according to staff.        Infant     Findings: male  infant     Infant observations: Weight: No birth  weight on file.   Length:   in  Observations/Comments:        Apgars: 7  @ 1 minute /    9  @ 5 minutes   Infant Name:      Placenta & Cord         Placenta delivered  Spontaneous  at   2/7/2023  1:25 AM     Cord: 3 vessels  present.   Nuchal Cord?  yes; Number of nuchal loops present:  1    Cord blood obtained: Yes    Cord gases obtained:  No    Cord gas results: Venous:  No results found for: PHCVEN    Arterial:  No results found for: PHCART     Repair      Episiotomy: Median     Yes  Suture used for repair: 3-0 chromic gut    Lacerations: No   Estimated Blood Loss:       Quantitative Blood Loss:          Complications     none    Disposition     Mother to Mother Baby/Postpartum  in stable condition currently.  Baby to NBN  in stable condition currently.    Nav Pastrana MD  02/07/23  01:46 EST

## 2023-02-07 NOTE — PROGRESS NOTES
Patient: Suha Raines  * No surgery found *  Anesthesia type: No value filed.    Patient location: Labor and Delivery  Last vitals:   Vitals:    02/07/23 1520   BP: 110/66   Pulse: 75   Resp: 16   Temp: 98.5 °F (36.9 °C)   SpO2: 97%     Level of consciousness: awake, alert and oriented    Post-anesthesia pain: adequate analgesia  Airway patency: patent  Respiratory: unassisted  Cardiovascular: stable and blood pressure at baseline  Hydration: euvolemic    Anesthetic complications: no, mild tenderness at epidural insertion site.

## 2023-02-08 VITALS
HEIGHT: 65 IN | SYSTOLIC BLOOD PRESSURE: 126 MMHG | DIASTOLIC BLOOD PRESSURE: 67 MMHG | RESPIRATION RATE: 18 BRPM | TEMPERATURE: 98.2 F | BODY MASS INDEX: 35.32 KG/M2 | OXYGEN SATURATION: 97 % | HEART RATE: 70 BPM | WEIGHT: 212 LBS

## 2023-02-08 PROBLEM — Z34.90 ENCOUNTER FOR PLANNED INDUCTION OF LABOR: Status: RESOLVED | Noted: 2023-02-06 | Resolved: 2023-02-08

## 2023-02-08 PROCEDURE — 0503F POSTPARTUM CARE VISIT: CPT | Performed by: NURSE PRACTITIONER

## 2023-02-08 RX ORDER — FERROUS SULFATE TAB EC 324 MG (65 MG FE EQUIVALENT) 324 (65 FE) MG
324 TABLET DELAYED RESPONSE ORAL
Status: DISCONTINUED | OUTPATIENT
Start: 2023-02-08 | End: 2023-02-08 | Stop reason: HOSPADM

## 2023-02-08 RX ORDER — PSEUDOEPHEDRINE HCL 30 MG
100 TABLET ORAL 2 TIMES DAILY
Qty: 60 CAPSULE | Refills: 1 | Status: SHIPPED | OUTPATIENT
Start: 2023-02-08

## 2023-02-08 RX ORDER — NAPROXEN 500 MG/1
500 TABLET ORAL 2 TIMES DAILY WITH MEALS
Qty: 60 TABLET | Refills: 1 | Status: SHIPPED | OUTPATIENT
Start: 2023-02-08

## 2023-02-08 RX ORDER — FERROUS SULFATE TAB EC 324 MG (65 MG FE EQUIVALENT) 324 (65 FE) MG
324 TABLET DELAYED RESPONSE ORAL
Qty: 30 TABLET | Refills: 2 | Status: SHIPPED | OUTPATIENT
Start: 2023-02-08

## 2023-02-08 RX ADMIN — ACETAMINOPHEN 1000 MG: 500 TABLET, FILM COATED ORAL at 08:09

## 2023-02-08 RX ADMIN — DOCUSATE SODIUM 100 MG: 100 CAPSULE ORAL at 08:09

## 2023-02-08 RX ADMIN — Medication 1 APPLICATION: at 11:15

## 2023-02-08 RX ADMIN — FERROUS SULFATE TAB EC 324 MG (65 MG FE EQUIVALENT) 324 MG: 324 (65 FE) TABLET DELAYED RESPONSE at 11:12

## 2023-02-08 RX ADMIN — IBUPROFEN 800 MG: 800 TABLET, FILM COATED ORAL at 03:59

## 2023-02-08 RX ADMIN — PRENATAL VIT W/ FE FUMARATE-FA TAB 27-0.8 MG 1 TABLET: 27-0.8 TAB at 08:09

## 2023-02-08 RX ADMIN — IBUPROFEN 800 MG: 800 TABLET, FILM COATED ORAL at 13:10

## 2023-02-08 NOTE — PLAN OF CARE
Goal Outcome Evaluation:  Plan of Care Reviewed With: patient, spouse        Progress: improving  Outcome Evaluation: vital signs stable, pain controlled with tylenol and motrin per prn order, fundal checks wnl, bonding well with infant, up ad richa, plans for d/c home later today

## 2023-02-08 NOTE — DISCHARGE SUMMARY
"Obstetrical Discharge Form    Primary OB Clinician: BIMAL      Gestational Age: 39w4d    Antepartum complications: none    Date of Delivery:  2023     Delivered By: Nav Mccarty [place]     Delivery Type: spontaneous vaginal delivery    Tubal Ligation: n/a    Baby: Liveborn male, Apgars 7/9, weight 8 #, 13.7 oz       Anesthesia: epidural, local    Intrapartum complications: None    Laceration: 2nd degree      Feeding method: breast      Discharge Date: 2023; Discharge Time: 09:29 EST    /82 (BP Location: Right arm, Patient Position: Lying)   Pulse 74   Temp 97.7 °F (36.5 °C) (Oral)   Resp 20   Ht 165.1 cm (65\")   Wt 96.2 kg (212 lb)   LMP 2022 (LMP Unknown)   SpO2 97%   Breastfeeding Yes   BMI 35.28 kg/m²      Abd: soft, fundus firm  Ext: no calf pain  Results from last 7 days   Lab Units 23  0916   HEMOGLOBIN g/dL 10.3*       Impression: 1) PPD#1     2) Boy - breast, desires circ    3) maternal fever s/p delivery - bld cultures for baby pending    3) anemia - Hgb. 10.3 g/dl, start ferrous sulfate    4) contraception - undecided    5) Dispo - home later today      Patient given written instruction sheet.  Follow-up appointment with [facility] in 6 weeks.    Rosalina Parker, SUNDEEP  09:29 EST  2023  "

## 2023-02-08 NOTE — CASE MANAGEMENT/SOCIAL WORK
Case Management Discharge Note      Final Note: Discharged home.         Selected Continued Care - Discharged on 2/8/2023 Admission date: 2/6/2023 - Discharge disposition: Home or Self Care    Destination    No services have been selected for the patient.              Durable Medical Equipment    No services have been selected for the patient.              Dialysis/Infusion    No services have been selected for the patient.              Home Medical Care    No services have been selected for the patient.              Therapy    No services have been selected for the patient.              Community Resources    No services have been selected for the patient.              Community & DME    No services have been selected for the patient.                       Final Discharge Disposition Code: 01 - home or self-care

## 2023-02-08 NOTE — PROGRESS NOTES
Patient: Suha Raines    @A@    Anesthesia Type: No value filed.  Patient location: Labor and Delivery  Last vitals  BP      Temp      Pulse     Resp      SpO2        Post vital signs: stable  Level of consciousness: awake, alert and oriented    Post-anesthesia pain: adequate analgesia  Airway patency: patent  Respiratory: unassisted  Cardiovascular: stable and blood pressure at baseline  Hydration: euvolemic    Difficult Airway: no  Anesthetic complications: no

## 2023-02-08 NOTE — PLAN OF CARE
Problem: Adult Inpatient Plan of Care  Goal: Plan of Care Review  Outcome: Met  Goal: Patient-Specific Goal (Individualized)  Outcome: Met  Goal: Absence of Hospital-Acquired Illness or Injury  Outcome: Met  Intervention: Identify and Manage Fall Risk  Recent Flowsheet Documentation  Taken 2/8/2023 0757 by Maegan Bearden RN  Safety Promotion/Fall Prevention: safety round/check completed  Intervention: Prevent and Manage VTE (Venous Thromboembolism) Risk  Recent Flowsheet Documentation  Taken 2/8/2023 0757 by Maegan Bearden RN  Activity Management: up ad ircha  Goal: Optimal Comfort and Wellbeing  Outcome: Met  Intervention: Monitor Pain and Promote Comfort  Recent Flowsheet Documentation  Taken 2/8/2023 0757 by Maegan Bearden RN  Pain Management Interventions: see MAR  Intervention: Provide Person-Centered Care  Recent Flowsheet Documentation  Taken 2/8/2023 0757 by Maegan Bearden RN  Trust Relationship/Rapport:   care explained   emotional support provided   choices provided  Goal: Readiness for Transition of Care  Outcome: Met   Goal Outcome Evaluation:

## 2023-02-15 ENCOUNTER — POSTPARTUM VISIT (OUTPATIENT)
Dept: OBSTETRICS AND GYNECOLOGY | Facility: CLINIC | Age: 33
End: 2023-02-15
Payer: COMMERCIAL

## 2023-02-15 VITALS
BODY MASS INDEX: 32.99 KG/M2 | DIASTOLIC BLOOD PRESSURE: 86 MMHG | HEIGHT: 65 IN | SYSTOLIC BLOOD PRESSURE: 138 MMHG | WEIGHT: 198 LBS

## 2023-02-15 DIAGNOSIS — N61.0 ACUTE MASTITIS: Primary | ICD-10-CM

## 2023-02-15 LAB
BILIRUB BLD-MCNC: NEGATIVE MG/DL
CLARITY, POC: CLEAR
COLOR UR: YELLOW
GLUCOSE UR STRIP-MCNC: NEGATIVE MG/DL
KETONES UR QL: NEGATIVE
LEUKOCYTE EST, POC: NEGATIVE
NITRITE UR-MCNC: NEGATIVE MG/ML
PH UR: 5 [PH] (ref 5–8)
PROT UR STRIP-MCNC: NEGATIVE MG/DL
RBC # UR STRIP: ABNORMAL /UL
SP GR UR: 1 (ref 1–1.03)
UROBILINOGEN UR QL: NORMAL

## 2023-02-15 PROCEDURE — 81002 URINALYSIS NONAUTO W/O SCOPE: CPT | Performed by: NURSE PRACTITIONER

## 2023-02-15 PROCEDURE — 99214 OFFICE O/P EST MOD 30 MIN: CPT | Performed by: NURSE PRACTITIONER

## 2023-02-15 RX ORDER — CEPHALEXIN 500 MG/1
500 CAPSULE ORAL 2 TIMES DAILY
Qty: 14 CAPSULE | Refills: 0 | Status: SHIPPED | OUTPATIENT
Start: 2023-02-15 | End: 2023-02-22

## 2023-02-15 NOTE — PROGRESS NOTES
"Subjective     Chief Complaint   Patient presents with   • Postpartum Care     Mastitis        Suha Raines is a 32 y.o.  whose LMP is Patient's last menstrual period was 2022 (lmp unknown).. This patient is new to me: no.  She presents with pain/tenderness right breast.     HPI    Delivered  on 23; boy - breastfeeding but only taking left breast.  Has been pumping the right breast.  Developed nipple tenderness of right breast 2 days ago; area appeared bruised - now pink underneath.  Noticed pus coming from right nipple. Right side of neck hurts.  Took OTC Naproxen for symptom relief.       The following portions of the patient's history were reviewed and updated as appropriate:vital signs, allergies, current medications, past medical history, past social history, past surgical history and problem list      Review of Systems     Review of Systems   Constitutional: Positive for fatigue. Negative for chills and fever.   Genitourinary: Positive for breast discharge, breast lump and breast pain.       Objective      /86   Ht 165.1 cm (65\")   Wt 89.8 kg (198 lb)   LMP 2022 (LMP Unknown)   BMI 32.95 kg/m²     Physical Exam    Physical Exam  Vitals reviewed.   Constitutional:       General: She is awake. She is not in acute distress.     Appearance: She is obese. She is not ill-appearing.   Eyes:      Conjunctiva/sclera: Conjunctivae normal.   Pulmonary:      Effort: Pulmonary effort is normal. No respiratory distress.   Chest:   Breasts:     Right: Skin change (erythema noted under right breast; TTP) and tenderness present. No bleeding or inverted nipple.       Musculoskeletal:      Cervical back: Neck supple. No rigidity.   Skin:     General: Skin is warm and dry.      Capillary Refill: Capillary refill takes less than 2 seconds.   Neurological:      Mental Status: She is alert and oriented to person, place, and time.   Psychiatric:         Mood and Affect: Mood and affect normal.    "      Behavior: Behavior normal.         Lab Review   Labs: Urinalysis - with micro     Imaging   No data reviewed    Assessment  Diagnoses and all orders for this visit:    1. Acute mastitis (Primary)  -     cephalexin (KEFLEX) 500 MG capsule; Take 1 capsule by mouth 2 (Two) Times a Day for 7 days.  Dispense: 14 capsule; Refill: 0    2. Postpartum follow-up  -     POC Urinalysis Dipstick  -     cephalexin (KEFLEX) 500 MG capsule; Take 1 capsule by mouth 2 (Two) Times a Day for 7 days.  Dispense: 14 capsule; Refill: 0        Additional Assessment:   1. Post-partum depression: EPDS scored 8; patient reported she is doing well  2.  Contraception: undecided         Return if symptoms worsen or fail to improve, for Next scheduled follow up.    I spent 20 minutes caring for Suha on this date of service. This time includes time spent by me in the following activities: preparing for the visit, reviewing tests, obtaining and/or reviewing a separately obtained history, performing a medically appropriate examination and/or evaluation, ordering medications, tests, or procedures and documenting information in the medical record     Rosalina Parker, APRN  2/15/2023  14:36 EST

## 2023-03-22 ENCOUNTER — POSTPARTUM VISIT (OUTPATIENT)
Dept: OBSTETRICS AND GYNECOLOGY | Facility: CLINIC | Age: 33
End: 2023-03-22
Payer: COMMERCIAL

## 2023-03-22 VITALS
HEIGHT: 65 IN | BODY MASS INDEX: 31.49 KG/M2 | WEIGHT: 189 LBS | DIASTOLIC BLOOD PRESSURE: 68 MMHG | SYSTOLIC BLOOD PRESSURE: 110 MMHG

## 2023-03-22 DIAGNOSIS — Z01.419 PAP SMEAR, LOW-RISK: Primary | ICD-10-CM

## 2023-03-22 DIAGNOSIS — Z11.51 SPECIAL SCREENING EXAMINATION FOR HUMAN PAPILLOMAVIRUS (HPV): ICD-10-CM

## 2023-03-22 DIAGNOSIS — T83.32XA INTRAUTERINE CONTRACEPTIVE DEVICE THREADS LOST, INITIAL ENCOUNTER: ICD-10-CM

## 2023-03-22 LAB
B-HCG UR QL: NEGATIVE
BILIRUB BLD-MCNC: NEGATIVE MG/DL
CLARITY, POC: CLEAR
COLOR UR: YELLOW
EXPIRATION DATE: NORMAL
GLUCOSE UR STRIP-MCNC: NEGATIVE MG/DL
INTERNAL NEGATIVE CONTROL: NORMAL
INTERNAL POSITIVE CONTROL: NORMAL
KETONES UR QL: NEGATIVE
LEUKOCYTE EST, POC: NEGATIVE
Lab: NORMAL
NITRITE UR-MCNC: NEGATIVE MG/ML
PH UR: 5 [PH] (ref 5–8)
PROT UR STRIP-MCNC: NEGATIVE MG/DL
RBC # UR STRIP: NEGATIVE /UL
SP GR UR: 1.03 (ref 1–1.03)
UROBILINOGEN UR QL: NORMAL

## 2023-03-22 PROCEDURE — 0503F POSTPARTUM CARE VISIT: CPT | Performed by: OBSTETRICS & GYNECOLOGY

## 2023-03-22 PROCEDURE — 81025 URINE PREGNANCY TEST: CPT | Performed by: OBSTETRICS & GYNECOLOGY

## 2023-03-22 NOTE — PROGRESS NOTES
"FINAL POSTPARTUM VISIT.    S:  DOING WELL.  PP depression?  no.  Breast or bottle?  breast.  Contraception?  Condoms.  Pt had lost IUD at conception; u/s then was neg.  Needs KUB.    O:  /68   Ht 165.1 cm (65\")   Wt 85.7 kg (189 lb)   LMP 04/24/2022 (LMP Unknown)   Breastfeeding Yes   BMI 31.45 kg/m²        Brief Urine Lab Results  (Last result in the past 365 days)      Color   Clarity   Blood   Leuk Est   Nitrite   Protein   CREAT   Urine HCG        03/22/23 1443 Yellow   Clear   Negative   Negative   Negative   Negative                   Exam: neg.  Lacs healing well.  Pap done.  Cervix appears wnl.    A:  Doing well pp    P:  RTO 1 yr. Will order KUB to look for hx lost IUD.      Nav Pastrana MD  15:19 EDT  03/22/23  Answers for HPI/ROS submitted by the patient on 3/20/2023  Please describe your symptoms.: 6wk postpartum visit  Have you had these symptoms before?: No  How long have you been having these symptoms?: 1-4 days  Please list any medications you are currently taking for this condition.: n/a  Please describe any probable cause for these symptoms. : n/a  What is the primary reason for your visit?: Other      "

## 2023-03-29 ENCOUNTER — TRANSCRIBE ORDERS (OUTPATIENT)
Dept: ADMINISTRATIVE | Facility: HOSPITAL | Age: 33
End: 2023-03-29
Payer: COMMERCIAL

## 2023-03-29 ENCOUNTER — LAB (OUTPATIENT)
Dept: LAB | Facility: HOSPITAL | Age: 33
End: 2023-03-29
Payer: COMMERCIAL

## 2023-03-29 ENCOUNTER — HOSPITAL ENCOUNTER (OUTPATIENT)
Dept: GENERAL RADIOLOGY | Facility: HOSPITAL | Age: 33
Discharge: HOME | End: 2023-03-29
Payer: COMMERCIAL

## 2023-03-29 DIAGNOSIS — T83.32XA MALPOSITIONED IUD, INITIAL ENCOUNTER: ICD-10-CM

## 2023-03-29 DIAGNOSIS — T83.32XA MALPOSITIONED IUD, INITIAL ENCOUNTER: Primary | ICD-10-CM

## 2023-03-29 DIAGNOSIS — T83.32XA INTRAUTERINE CONTRACEPTIVE DEVICE THREADS LOST, INITIAL ENCOUNTER: ICD-10-CM

## 2023-03-29 LAB
B-HCG UR QL: NEGATIVE
CYTOLOGIST CVX/VAG CYTO: ABNORMAL
CYTOLOGY CVX/VAG DOC CYTO: ABNORMAL
CYTOLOGY CVX/VAG DOC THIN PREP: ABNORMAL
DX ICD CODE: ABNORMAL
DX ICD CODE: ABNORMAL
HIV 1 & 2 AB SER-IMP: ABNORMAL
HPV I/H RISK 4 DNA CVX QL PROBE+SIG AMP: POSITIVE
HPV16 DNA CVX QL PROBE+SIG AMP: NEGATIVE
HPV18+45 E6+E7 MRNA CVX QL NAA+PROBE: NEGATIVE
OTHER STN SPEC: ABNORMAL
PATHOLOGIST CVX/VAG CYTO: ABNORMAL
STAT OF ADQ CVX/VAG CYTO-IMP: ABNORMAL

## 2023-03-29 PROCEDURE — 81025 URINE PREGNANCY TEST: CPT

## 2023-03-29 PROCEDURE — 74018 RADEX ABDOMEN 1 VIEW: CPT

## 2023-03-29 NOTE — PROGRESS NOTES
PIP that her pap smear is again abnormal.  She has never had a colposcopy and it is time to properly evaluate her cervical pathology.  She has a hx of HGSIL, no treatment.  She needs to a colpo, bx, ECC.  If she is unable or unwilling to do this in the office due to anxiety, I can do this in the OR under anesthesia.  It is very risky to do nothing in this situation.

## 2023-05-08 RX ORDER — FLUCONAZOLE 200 MG/1
200 TABLET ORAL ONCE
Qty: 1 TABLET | Refills: 2 | Status: SHIPPED | OUTPATIENT
Start: 2023-05-08 | End: 2023-05-09

## 2023-10-19 ENCOUNTER — TELEPHONE (OUTPATIENT)
Dept: OBSTETRICS AND GYNECOLOGY | Facility: CLINIC | Age: 33
End: 2023-10-19

## 2023-10-30 DIAGNOSIS — F41.9 ANXIETY: Primary | ICD-10-CM

## 2023-10-30 RX ORDER — ALPRAZOLAM 1 MG/1
1 TABLET ORAL 2 TIMES DAILY PRN
Qty: 1 TABLET | Refills: 0 | Status: SHIPPED | OUTPATIENT
Start: 2023-10-30

## 2023-11-03 ENCOUNTER — OFFICE VISIT (OUTPATIENT)
Dept: OBSTETRICS AND GYNECOLOGY | Facility: CLINIC | Age: 33
End: 2023-11-03
Payer: COMMERCIAL

## 2023-11-03 VITALS
SYSTOLIC BLOOD PRESSURE: 130 MMHG | WEIGHT: 194.2 LBS | HEIGHT: 65 IN | DIASTOLIC BLOOD PRESSURE: 84 MMHG | BODY MASS INDEX: 32.36 KG/M2

## 2023-11-03 DIAGNOSIS — Z13.89 SCREENING FOR GENITOURINARY CONDITION: ICD-10-CM

## 2023-11-03 DIAGNOSIS — R87.613 HGSIL (HIGH GRADE SQUAMOUS INTRAEPITHELIAL LESION) ON PAP SMEAR OF CERVIX: Primary | ICD-10-CM

## 2023-11-03 LAB
B-HCG UR QL: NEGATIVE
BILIRUB BLD-MCNC: NEGATIVE MG/DL
CLARITY, POC: CLEAR
COLOR UR: YELLOW
EXPIRATION DATE: NORMAL
GLUCOSE UR STRIP-MCNC: NEGATIVE MG/DL
INTERNAL NEGATIVE CONTROL: NORMAL
INTERNAL POSITIVE CONTROL: NORMAL
KETONES UR QL: NEGATIVE
LEUKOCYTE EST, POC: NEGATIVE
Lab: NORMAL
NITRITE UR-MCNC: NEGATIVE MG/ML
PH UR: 5 [PH] (ref 5–8)
PROT UR STRIP-MCNC: NEGATIVE MG/DL
RBC # UR STRIP: NEGATIVE /UL
SP GR UR: 1 (ref 1–1.03)
UROBILINOGEN UR QL: NORMAL

## 2023-11-03 NOTE — PROGRESS NOTES
"Colposcopy Procedure Note    /84   Ht 165.1 cm (65\")   Wt 88.1 kg (194 lb 3.2 oz)   LMP 09/25/2023   BMI 32.32 kg/m²     Indications: Most recent Pap smear showed: low-grade squamous intraepithelial neoplasia (LGSIL - encompassing HPV,mild dysplasia,KIA I).  Prior cervical/vaginal disease: KIA 3.  Prior cervical treatment: no treatment.    Procedure Details   The risks and benefits of the procedure and Verbal informed consent obtained.    Speculum placed in vagina and excellent visualization of cervix achieved, cervix swabbed x 3 with acetic acid solution and Lugol's solution     Findings:  Physical Exam  Genitourinary:            Cervix: visible lesion(s) at see note o'clock; cervix swabbed with Lugol's solution, SCJ visualized - lesion at see note o'clock, cervical biopsies taken at 12 o'clock, specimen labelled and sent to pathology, and hemostasis achieved with Monsel's solution.  Vaginal inspection: normal without visible lesions.  Vulvar colposcopy: vulvar colposcopy not performed.      Specimens: cx bx, ECC    Complications: none.    PT TOLERATED PROCEDURE WELL.     I saw the patient with a face mask, gloves and eye protection  The patient herself was masked.  Social distancing was observed as appropriate. All COVID precautions observed.     IMPRESSION:  ABNORMAL, ADEQUATE COLPOSCOPY    Plan:  call for results, pt may be candidate for LEEP.        Nav Pastrana MD  12:39 EDT  11/03/23  "

## 2023-11-08 LAB
DX ICD CODE: NORMAL
PATH REPORT.FINAL DX SPEC: NORMAL
PATH REPORT.GROSS SPEC: NORMAL
PATH REPORT.SITE OF ORIGIN SPEC: NORMAL
PATHOLOGIST NAME: NORMAL
PAYMENT PROCEDURE: NORMAL

## 2023-11-09 ENCOUNTER — TELEPHONE (OUTPATIENT)
Dept: OBSTETRICS AND GYNECOLOGY | Facility: CLINIC | Age: 33
End: 2023-11-09
Payer: COMMERCIAL

## 2023-11-09 NOTE — TELEPHONE ENCOUNTER
----- Message from Claudia Mcdaniel MA sent at 11/9/2023  8:03 AM EST -----    ----- Message -----  From: Leanne Fortune  Sent: 11/8/2023   1:50 PM EST  To: Claudia Mcdaniel MA    Can you please call [facility] and update the specimen to be of the correct area

## 2023-11-09 NOTE — TELEPHONE ENCOUNTER
I contacted [facility], today. They stated they had several pending orders and took pt information and stated they would return my call shortly regarding specimen.

## 2023-11-22 ENCOUNTER — TELEPHONE (OUTPATIENT)
Dept: OBSTETRICS AND GYNECOLOGY | Facility: CLINIC | Age: 33
End: 2023-11-22

## 2023-11-22 NOTE — TELEPHONE ENCOUNTER
Spoke with pt, Dr Pastrana ordered a LEEP in OR and pt will discuss with her  and call back to schedule.

## 2023-11-22 NOTE — TELEPHONE ENCOUNTER
Caller: Suha Raines    Relationship: Self    Best call back number: [phone]    Caller requesting test results: PT    What test was performed: COLPO    When was the test performed: 11/3/23     Where was the test performed: OFFICE     Additional notes: PT STATES SHE CALLED LAST WEDNESDAY AND LEFT A MESSAGE FOR SOMEONE TO RETURN HER CALL TO GO OVER TEST RESULTS FOR HER COLPO AND NO ONE HAS RETURNED HER CALL PLEASE CALL

## 2023-12-05 ENCOUNTER — TELEPHONE (OUTPATIENT)
Dept: OBSTETRICS AND GYNECOLOGY | Facility: CLINIC | Age: 33
End: 2023-12-05
Payer: COMMERCIAL

## 2023-12-05 NOTE — TELEPHONE ENCOUNTER
I called pt to discuss her results and my recommendation for a LEEP.  This recommendation is based on a discordance between her pap smear, biopsy results, and the colposcopic findings.  Her pap showed LGSIL, her biopsies were neg, and her colposcopy was ABNORMAL.  Pt states she would consider a LEEP and call back.

## 2023-12-20 ENCOUNTER — OFFICE VISIT (OUTPATIENT)
Dept: OBSTETRICS AND GYNECOLOGY | Facility: CLINIC | Age: 33
End: 2023-12-20
Payer: COMMERCIAL

## 2023-12-20 VITALS
BODY MASS INDEX: 33.05 KG/M2 | WEIGHT: 198.4 LBS | SYSTOLIC BLOOD PRESSURE: 130 MMHG | HEIGHT: 65 IN | DIASTOLIC BLOOD PRESSURE: 82 MMHG

## 2023-12-20 DIAGNOSIS — N91.2 AMENORRHEA: Primary | ICD-10-CM

## 2023-12-20 DIAGNOSIS — Z32.01 PREGNANCY CONFIRMED BY POSITIVE URINE TEST: ICD-10-CM

## 2023-12-20 DIAGNOSIS — O02.0 EMPTY GESTATIONAL SAC WITH ONGOING PREGNANCY: ICD-10-CM

## 2023-12-20 LAB
B-HCG UR QL: POSITIVE
BILIRUB BLD-MCNC: NEGATIVE MG/DL
CLARITY, POC: CLEAR
COLOR UR: YELLOW
EXPIRATION DATE: ABNORMAL
GLUCOSE UR STRIP-MCNC: NEGATIVE MG/DL
HCG INTACT+B SERPL-ACNC: NORMAL MIU/ML
INTERNAL NEGATIVE CONTROL: ABNORMAL
INTERNAL POSITIVE CONTROL: ABNORMAL
KETONES UR QL: NEGATIVE
LEUKOCYTE EST, POC: NEGATIVE
Lab: ABNORMAL
NITRITE UR-MCNC: NEGATIVE MG/ML
PH UR: 5 [PH] (ref 5–8)
PROT UR STRIP-MCNC: NEGATIVE MG/DL
RBC # UR STRIP: NEGATIVE /UL
SP GR UR: 1 (ref 1–1.03)
UROBILINOGEN UR QL: NORMAL

## 2023-12-20 NOTE — PROGRESS NOTES
Confirmation of pregnancy     Chief Complaint   Patient presents with    Amenorrhea         Suha Raines is being seen today for evaluation of absence of menses. Due to her period being late, she tested for pregnancy and had + home UPT. She is a 33 y.o. . This problem is new to me, the examiner.       LNMP: 10/30/23 Confident with date: Yes  Taking prenatal vitamins: Yes. Needs RX: No  Planned pregnancy: Yes  Prior obstetric issues, potential pregnancy concerns: G2. .   Family history of genetic issues (includes FOB): denies. FOB adopted, uncertain.   Varicella Hx: disease   Flu vaccine: has not had, declines  COVID 19 vaccine: S/P vaccine. Booster vaccine: no  History of STDs: denies   Current medications: denies   Last pap smear: 3/23. Colpo   Smoker: No  Drug or alcohol abuse: No  H/O physical, emotional or sexual abuse:denies   H/O mental health disorder: denies   Prior testing for Cystic Fibrosis Carrier or Sickle Cell Trait- Neg CF/SMA/FX last pregnancy   Prepregnancy BMI - Body mass index is 33.02 kg/m².    Past Medical History:   Diagnosis Date    Abdominal cramps     Abnormal Pap smear of cervix     Asthma     Gestational hypertension, antepartum 2023    Varicella     Weight loss        Past Surgical History:   Procedure Laterality Date    TONSILLECTOMY      WISDOM TOOTH EXTRACTION           Current Outpatient Medications:     albuterol sulfate  (90 Base) MCG/ACT inhaler, INHALE 2 PUFFS PO Q 6 H PRN, Disp: , Rfl:     albuterol sulfate  (90 Base) MCG/ACT inhaler, Inhale 2 puffs Every 4 (Four) Hours As Needed for Wheezing., Disp: 6.7 g, Rfl: 0    aspirin 81 MG oral suspension, , Disp: , Rfl:     fexofenadine (ALLEGRA) 180 MG tablet, , Disp: , Rfl:     Magnesium 500 MG capsule, Daily., Disp: , Rfl:     naproxen (Naprosyn) 500 MG tablet, Take 1 tablet by mouth 2 (Two) Times a Day With Meals., Disp: 60 tablet, Rfl: 1    Omega-3 Fatty Acids (fish oil) 1000 MG capsule capsule,  "Daily., Disp: , Rfl:     Prenatal Vit-Fe Fumarate-FA (prenatal vitamin 27-0.8) 27-0.8 MG tablet tablet, Take  by mouth Daily., Disp: , Rfl:     vitamin B-12 (CYANOCOBALAMIN) 1000 MCG tablet, See Admin Instructions., Disp: , Rfl:     Zinc 30 MG capsule, Take  by mouth., Disp: , Rfl:     No Known Allergies    Social History     Socioeconomic History    Marital status:    Tobacco Use    Smoking status: Never    Smokeless tobacco: Never   Vaping Use    Vaping Use: Never used   Substance and Sexual Activity    Alcohol use: Not Currently     Comment: casual    Drug use: Never    Sexual activity: Yes     Partners: Male     Birth control/protection: I.U.D.     Comment: ParaGuard- August 2014       Family History   Problem Relation Age of Onset    No Known Problems Mother     No Known Problems Father     Breast cancer Neg Hx     Ovarian cancer Neg Hx     Colon cancer Neg Hx     Uterine cancer Neg Hx        Review of systems     Review of Systems   Constitutional:  Positive for fatigue.        Loss of appetite    Respiratory: Negative.     Cardiovascular: Negative.    Gastrointestinal: Negative.    Genitourinary:  Positive for menstrual problem.   Musculoskeletal: Negative.    Skin: Negative.    Neurological: Negative.    Psychiatric/Behavioral: Negative.         Objective    /82   Ht 165.1 cm (65\")   Wt 90 kg (198 lb 6.4 oz)   LMP 10/30/2023 (Exact Date)   BMI 33.02 kg/m²     Physical Exam  Vitals and nursing note reviewed.   Constitutional:       Appearance: Normal appearance.   Musculoskeletal:         General: Normal range of motion.   Skin:     General: Skin is warm and dry.   Neurological:      General: No focal deficit present.      Mental Status: She is alert and oriented to person, place, and time.   Psychiatric:         Mood and Affect: Mood normal.         Behavior: Behavior normal.         Assessment/Plan      Missed menses: + UPT in office. LNMP 10/30/23 = 7.2 week EGA with an EDC=8/5/24. " Oriented to practice. US IMP: Uterus AV. GS seen measuring 5.4 weeks. YS appears wnl. No fetal pole seen. (R) ovary with two cyst: simple measuring 1.23 x 1.59cm and complex measuring 2.11 x 1.59cm. (L) ovary wnl. FF noted in PCDS measuring 3.21 x 2.27cm.     Pregnancy: Disc importance of regular prenatal care. Enc PNV daily. Counseled on providers and on call phone. Disc Tylenol products are ok and encouraged no ibuprofen or ASA in pregnancy.  Disc exercise in pregnancy, diet, expected weight gain, etc. Enc no use of tobacco, vaping, drugs, or alcohol during pregnancy. Rev warn s/s of SAB.     Labs: Pt counseled on genetic screening, Quad screen, AFP, and NIPS.     Body mass index is 33.02 kg/m². Obese women with a pre-pregnancy BMI of 30+ should strive to gain approx 11-20 pounds for the entire pregnancy.     Smoker- non smoker    6.   COVID19 precautions were reviewed with the patient. Continue to encourage social distancing, wearing a mask, and good hand hygiene.  I wore a mask, protective eye wear, and gloves during this patient encounter.  Patient also wearing a surgical mask and social distancing was observed. Hand hygeine performed before and after seeing the patient. Also encouraged COVID booster vaccine at 6 month interval from last COVID vaccine. Info provided on Covid vaccine: S/P vaccine    7.  Flu vaccine. Encouraged the flu vaccine during pregnancy. Discuss normal physiological changes during pregnancy increase the susceptibility of the flu virus and increase the risk of severe illness for the pregnant woman. Disc flu can be harmful to the unborn baby as well. Enc the flu vaccine. Disc with patient that getting the flu vaccine is the first and most important step in protecting against the flu. Flu vaccines given during pregnancy help protect both the mother and the baby. Getting the flu vaccine during pregnancy also helps protect the  from flu illness for several months after their birth, when  then are too young to get vaccinated. Also disc the importance of good hand hygiene and avoiding people who are sick. The patient declines the flu vaccine.     8.  H/O 8.13# infant- Watch growth 3rd trimester.     9.  Empty gestational sac- Check quant HCG, repeat in 2 days.     All questions answered.       Encounter Diagnoses   Name Primary?    Amenorrhea Yes       Diagnoses and all orders for this visit:    Amenorrhea  -     POC Urinalysis Dipstick  -     POC Pregnancy, Urine        RTO 12/22/23 for repeat quant.      Noreen Taylor, SUNDEEP  12/20/2023  11:07 EST

## 2023-12-26 PROBLEM — N91.2 AMENORRHEA: Status: ACTIVE | Noted: 2023-12-26

## 2023-12-26 PROBLEM — O02.0 EMPTY GESTATIONAL SAC WITH ONGOING PREGNANCY: Status: ACTIVE | Noted: 2023-12-26

## 2023-12-26 PROBLEM — Z32.01 PREGNANCY CONFIRMED BY POSITIVE URINE TEST: Status: ACTIVE | Noted: 2023-12-26

## 2023-12-28 ENCOUNTER — OFFICE VISIT (OUTPATIENT)
Dept: OBSTETRICS AND GYNECOLOGY | Facility: CLINIC | Age: 33
End: 2023-12-28
Payer: COMMERCIAL

## 2023-12-28 VITALS
WEIGHT: 197.4 LBS | HEIGHT: 65 IN | BODY MASS INDEX: 32.89 KG/M2 | SYSTOLIC BLOOD PRESSURE: 134 MMHG | DIASTOLIC BLOOD PRESSURE: 90 MMHG

## 2023-12-28 DIAGNOSIS — Z34.90 EARLY STAGE OF PREGNANCY: Primary | ICD-10-CM

## 2023-12-28 RX ORDER — DIPHENHYDRAMINE HYDROCHLORIDE 25 MG/1
25 CAPSULE ORAL NIGHTLY
Qty: 30 TABLET | Refills: 1 | Status: SHIPPED | OUTPATIENT
Start: 2023-12-28

## 2023-12-28 RX ORDER — DOXYLAMINE SUCCINATE 25 MG/1
25 TABLET ORAL NIGHTLY
Qty: 30 TABLET | Refills: 1 | Status: SHIPPED | OUTPATIENT
Start: 2023-12-28

## 2023-12-28 NOTE — PROGRESS NOTES
"Subjective     Chief Complaint   Patient presents with    Routine Prenatal Visit       Suha Raines is a 33 y.o.  whose LMP is Patient's last menstrual period was 10/30/2023 (exact date).. She presents today for follow up. She was seen for a confirmation of pregnancy on 23. By her LNMP of 10/30/23 she should have been 7.2 weeks gestation. US that day showed an empty gestational sac. She had quant HCG x 2- 62413 on  compared to 58721 on 23. She presents today for repeat US to confirm viability of pregnancy.     HPI    HPI    The following portions of the patient's history were reviewed and updated as appropriate:vital signs, allergies, current medications, past medical history, past social history, past surgical history, and problem list      Review of Systems     Review of Systems   Constitutional:  Positive for fatigue.   Genitourinary:  Positive for menstrual problem.       Objective      /90   Ht 165.1 cm (65\")   Wt 89.5 kg (197 lb 6.4 oz)   LMP 10/30/2023 (Exact Date)   BMI 32.85 kg/m²     Physical Exam    Physical Exam  Vitals and nursing note reviewed.   Constitutional:       Appearance: Normal appearance.   Musculoskeletal:         General: Normal range of motion.   Skin:     General: Skin is warm and dry.   Neurological:      General: No focal deficit present.      Mental Status: She is alert and oriented to person, place, and time.   Psychiatric:         Mood and Affect: Mood normal.         Behavior: Behavior normal.         Lab Review   Labs: Quant HCG      Imaging   Ultrasound - Pelvic Vaginal US IMP: Single, viable IUP @ 6.2 weeks. FHR 119bpm. Both ovaries wnl. EDC 24.     Assessment  There are no diagnoses linked to this encounter.    Additional Assessment:   Early stage of pregnancy     Plan     Early stage of pregnancy- US confirms IUP @ 6.2 weeks. EDC 24. EDC by lnmp rejected d/t > 5 days difference in dating.   2.  Elevated BP- will continue to monitor "   3.  Nausea- Enc small frequent meals. RX B6 and unisom.     RTO 2 weeks for new OB exam, labs and US     Noreen Taylor, APRN  1/3/2024

## 2024-01-03 PROBLEM — Z34.90 EARLY STAGE OF PREGNANCY: Status: ACTIVE | Noted: 2024-01-03

## 2024-01-15 ENCOUNTER — INITIAL PRENATAL (OUTPATIENT)
Dept: OBSTETRICS AND GYNECOLOGY | Facility: CLINIC | Age: 34
End: 2024-01-15
Payer: COMMERCIAL

## 2024-01-15 VITALS — SYSTOLIC BLOOD PRESSURE: 136 MMHG | WEIGHT: 195 LBS | DIASTOLIC BLOOD PRESSURE: 88 MMHG | BODY MASS INDEX: 32.45 KG/M2

## 2024-01-15 DIAGNOSIS — Z36.9 ENCOUNTER FOR ANTENATAL SCREENING, UNSPECIFIED: ICD-10-CM

## 2024-01-15 DIAGNOSIS — Z87.59 HISTORY OF GESTATIONAL HYPERTENSION: ICD-10-CM

## 2024-01-15 DIAGNOSIS — O09.899 SHORT INTERVAL BETWEEN PREGNANCIES AFFECTING PREGNANCY, ANTEPARTUM: ICD-10-CM

## 2024-01-15 DIAGNOSIS — J45.20 MILD INTERMITTENT ASTHMA WITHOUT COMPLICATION: ICD-10-CM

## 2024-01-15 DIAGNOSIS — K59.00 CONSTIPATION, UNSPECIFIED CONSTIPATION TYPE: ICD-10-CM

## 2024-01-15 DIAGNOSIS — Z34.00 INITIAL OBSTETRIC VISIT, ANTEPARTUM: Primary | ICD-10-CM

## 2024-01-15 DIAGNOSIS — O21.9 NAUSEA AND VOMITING DURING PREGNANCY: ICD-10-CM

## 2024-01-15 PROBLEM — N61.0 ACUTE MASTITIS: Status: RESOLVED | Noted: 2023-02-15 | Resolved: 2024-01-15

## 2024-01-15 PROBLEM — O02.0 EMPTY GESTATIONAL SAC WITH ONGOING PREGNANCY: Status: RESOLVED | Noted: 2023-12-26 | Resolved: 2024-01-15

## 2024-01-15 PROBLEM — J45.21 MILD INTERMITTENT ASTHMA WITH EXACERBATION: Status: ACTIVE | Noted: 2024-01-15

## 2024-01-15 LAB
BILIRUB BLD-MCNC: NEGATIVE MG/DL
CLARITY, POC: CLEAR
COLOR UR: YELLOW
GLUCOSE UR STRIP-MCNC: NEGATIVE MG/DL
KETONES UR QL: NEGATIVE
LEUKOCYTE EST, POC: NEGATIVE
NITRITE UR-MCNC: NEGATIVE MG/ML
PH UR: 5 [PH] (ref 5–8)
PROT UR STRIP-MCNC: NEGATIVE MG/DL
RBC # UR STRIP: NEGATIVE /UL
SP GR UR: 1 (ref 1–1.03)
UROBILINOGEN UR QL: NORMAL

## 2024-01-15 RX ORDER — ONDANSETRON 4 MG/1
4 TABLET, FILM COATED ORAL EVERY 8 HOURS PRN
Qty: 30 TABLET | Refills: 2 | Status: SHIPPED | OUTPATIENT
Start: 2024-01-15

## 2024-01-15 RX ORDER — DOCUSATE SODIUM 100 MG/1
100 CAPSULE, LIQUID FILLED ORAL 2 TIMES DAILY
Qty: 60 CAPSULE | Refills: 3 | Status: SHIPPED | OUTPATIENT
Start: 2024-01-15

## 2024-01-15 RX ORDER — ALBUTEROL SULFATE 90 UG/1
2 AEROSOL, METERED RESPIRATORY (INHALATION) EVERY 4 HOURS PRN
Qty: 6.7 G | Refills: 0 | Status: SHIPPED | OUTPATIENT
Start: 2024-01-15

## 2024-01-15 NOTE — PROGRESS NOTES
Initial OB Visit     Chief Complaint   Patient presents with    Initial Prenatal Visit       Suha Raines is being seen today for her first obstetrical visit.  She is a 33 y.o.    8w6d gestation. This problem is new to me: yes      # 1 - Date: 23, Sex: Male, Weight: 4017 g (8 lb 13.7 oz), GA: 39w4d, Delivery: Vaginal, Spontaneous, Apgar1: 7, Apgar5: 9, Living: Living, Birth Comments: None    # 2 - Date: None, Sex: None, Weight: None, GA: None, Delivery: None, Apgar1: None, Apgar5: None, Living: None, Birth Comments: None      LNMP: 10/30/2023  Confident with date: Yes  Taking prenatal vitamins: Yes  Planned pregnancy: Yes  Prior obstetric issues, potential pregnancy concerns:  x 1; recent 2023; GHTN  Family history of genetic issues (includes FOB): no  Prior infections concerning in pregnancy (Rash, fever in last 2 weeks): no  Varicella Hx: yes  Flu vaccine: no; declines  COVID Vaccine: yes  History of STDs: no  Current medications: PNV, Unispm/Vit B6  Last pap smear: 3/2023-LGSIL; colpo 2023- neg  Smoker: No  Drug or alcohol abuse: No  H/O Physical, mental or sexual abuse: no  H/O mental health disorder: ?white coat syndrome- HTN  Prior testing for Cystic Fibrosis Carrier or Sickle Cell Trait- CF/SMA/FX neg in prev pregnancy  Prepregnancy BMI: Body mass index is 32.45 kg/m².      Past Medical History:   Diagnosis Date    Abdominal cramps     Abnormal Pap smear of cervix     Asthma     Gestational hypertension, antepartum 2023    Varicella     Weight loss        Past Surgical History:   Procedure Laterality Date    TONSILLECTOMY      WISDOM TOOTH EXTRACTION           Current Outpatient Medications:     albuterol sulfate  (90 Base) MCG/ACT inhaler, INHALE 2 PUFFS PO Q 6 H PRN, Disp: , Rfl:     albuterol sulfate  (90 Base) MCG/ACT inhaler, Inhale 2 puffs Every 4 (Four) Hours As Needed for Wheezing., Disp: 6.7 g, Rfl: 0    aspirin 81 MG oral suspension, , Disp: , Rfl:      doxylamine (Unisom SleepTabs) 25 MG tablet, Take 1 tablet by mouth Every Night., Disp: 30 tablet, Rfl: 1    fexofenadine (ALLEGRA) 180 MG tablet, , Disp: , Rfl:     Magnesium 500 MG capsule, Daily., Disp: , Rfl:     naproxen (Naprosyn) 500 MG tablet, Take 1 tablet by mouth 2 (Two) Times a Day With Meals., Disp: 60 tablet, Rfl: 1    Omega-3 Fatty Acids (fish oil) 1000 MG capsule capsule, Daily., Disp: , Rfl:     Prenatal Vit-Fe Fumarate-FA (prenatal vitamin 27-0.8) 27-0.8 MG tablet tablet, Take  by mouth Daily., Disp: , Rfl:     vitamin B-12 (CYANOCOBALAMIN) 1000 MCG tablet, See Admin Instructions., Disp: , Rfl:     vitamin B-6 (PYRIDOXINE) 25 MG tablet, Take 1 tablet by mouth Every Night., Disp: 30 tablet, Rfl: 1    Zinc 30 MG capsule, Take  by mouth., Disp: , Rfl:     No Known Allergies    Social History     Socioeconomic History    Marital status:    Tobacco Use    Smoking status: Never    Smokeless tobacco: Never   Vaping Use    Vaping Use: Never used   Substance and Sexual Activity    Alcohol use: Not Currently     Comment: casual    Drug use: Never    Sexual activity: Yes     Partners: Male     Birth control/protection: I.U.D.     Comment: ParaGuard- August 2014       Family History   Problem Relation Age of Onset    No Known Problems Mother     No Known Problems Father     Breast cancer Neg Hx     Ovarian cancer Neg Hx     Colon cancer Neg Hx     Uterine cancer Neg Hx        Review of systems     All other systems reviewed and are negative except for: Gastrointestinal: positive for constipation and nausea  Behavioral/Psych: positive for anxiety     Objective    /88   Wt 88.5 kg (195 lb)   LMP 10/30/2023 (Exact Date)   BMI 32.45 kg/m²     General Appearance:    Alert, cooperative, in no acute distress, habitus obese   Head:    Normocephalic, without obvious abnormality, atraumatic   Neck:   No adenopathy, supple, trachea midline, no thyromegaly   Lungs:     Clear to auscultation,respirations  "regular, even and     unlabored    Heart:    Regular rhythm and normal rate, normal S1 and S2, no       murmur, no gallop, no rub, no click   Breast Exam:    Deferred   Abdomen:     Normal bowel sounds, no masses, no organomegaly, soft,    non-tender, non-distended, no guarding, no rebound                tenderness   Genitalia:   Deferred   Extremities:   Moves all extremities well, no edema, no cyanosis, no        redness   Skin:   No bleeding, bruising or rash   Lymph nodes:   No palpable adenopathy   Neurologic:   Sensation intact, A&O times 3       Psych:    Assessment/Plan    1) Pregnancy at 8w6d- US IMP: there is GS with YS and viable fetal pole seen within UT. FHR 178bpm. UT anteverted. OV WNL. US findings discussed with pt. EDC established 8/20/2024 and confirmed by US; LMP rejected based on ACOG recommendations.     2) OB exam: OB exam completed: Yes. New OB bag provided No. Pap collected: No; UTD. Provided list of safe medications to take while in pregnancy.    3) Labs: OB labs collected: No; she declined today; wants to get NIPT when possible.  States she is a \"hard stick\" and wants all labs drawn at the same time. Counseled on genetic screening/NIPS: Yes, she desires NIPT; genetic screening neg in previous pregnancy. Counseled on Quad screen and AFP: Yes, she desires both.    4) Body mass index is 32.45 kg/m². Obese women with a pre-pregnancy BMI of 30+ should strive to gain approx 11-20 pounds for the entire pregnancy.       5)  Prenatal care: Oriented to the office and prenatal care. Encourage prenatal vitamins. Disc Tylenol products are fine, avoid aspirin and ibuprofen; Zika (travel restrictions/ok to use insect repellant); not to change cat litter; food restrictions; exercise; avoidance of alcohol, tobacco, drugs and saunas/hot tubs.     6) COVID19 precautions were reviewed with the patient. Continue to encourage social distancing, wearing a mask, and good hand hygiene.  Hand hygeine performed " before and after seeing the patient.     7) H/O 8.13# infant- Watch growth 3rd trimester.      8)  short-term interval between pregnancies-  2023; check CL 14-16wga    9) hx of GHTN- ? Related to white coat vs CHTN; needs baseline PIH labs at next f/u    10) constipation- on Magnesium 500 mg; not drinking a lot of water due to nausea; ERX Colace; rec OTC Miralax and prune juice    11) N/V- morning sickness improved with Unisom/vit. 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     I spent 30 minutes caring for Suha on this date of service. This time includes time spent by me in the following activities: preparing for the visit, reviewing tests, obtaining and/or reviewing a separately obtained history, performing a medically appropriate examination and/or evaluation, counseling and educating the patient/family/caregiver, ordering medications, tests, or procedures, and documenting information in the medical record.  This time does not include time spent performing ultrasound.    RTO 2 weeks for OBT and new OB labs/NIPS/baseline SIERRA    Rosalina Parker, APRN    1/15/2024  16:04 EST

## 2024-01-17 LAB
AMPHETAMINES UR QL SCN: NEGATIVE NG/ML
BARBITURATES UR QL SCN: NEGATIVE NG/ML
BENZODIAZ UR QL SCN: NEGATIVE NG/ML
BUPRENORPHINE UR QL: NEGATIVE NG/ML
BZE UR QL SCN: NEGATIVE NG/ML
C TRACH RRNA SPEC QL NAA+PROBE: NEGATIVE
CANNABINOIDS UR QL SCN: NEGATIVE NG/ML
CREAT UR-MCNC: 100.9 MG/DL (ref 20–300)
FENTANYL UR-MCNC: NEGATIVE PG/ML
LABORATORY COMMENT REPORT: NORMAL
MEPERIDINE UR QL: NEGATIVE NG/ML
METHADONE UR QL SCN: NEGATIVE NG/ML
N GONORRHOEA RRNA SPEC QL NAA+PROBE: NEGATIVE
OPIATES UR QL SCN: NEGATIVE NG/ML
OXYCODONE+OXYMORPHONE UR QL SCN: NEGATIVE NG/ML
PCP UR QL: NEGATIVE NG/ML
PH UR: 5 [PH] (ref 4.5–8.9)
PROPOXYPH UR QL SCN: NEGATIVE NG/ML
T VAGINALIS RRNA SPEC QL NAA+PROBE: NEGATIVE
TRAMADOL UR QL SCN: NEGATIVE NG/ML

## 2024-01-29 ENCOUNTER — ROUTINE PRENATAL (OUTPATIENT)
Dept: OBSTETRICS AND GYNECOLOGY | Facility: CLINIC | Age: 34
End: 2024-01-29
Payer: COMMERCIAL

## 2024-01-29 VITALS — DIASTOLIC BLOOD PRESSURE: 72 MMHG | BODY MASS INDEX: 32.48 KG/M2 | SYSTOLIC BLOOD PRESSURE: 118 MMHG | WEIGHT: 195.2 LBS

## 2024-01-29 DIAGNOSIS — Z36.9 UNSPECIFIED ANTENATAL SCREENING: ICD-10-CM

## 2024-01-29 DIAGNOSIS — Z34.91 PRENATAL CARE IN FIRST TRIMESTER: Primary | ICD-10-CM

## 2024-01-29 DIAGNOSIS — O09.899 SHORT INTERVAL BETWEEN PREGNANCIES AFFECTING PREGNANCY, ANTEPARTUM: ICD-10-CM

## 2024-01-29 DIAGNOSIS — Z87.59 HISTORY OF GESTATIONAL HYPERTENSION: ICD-10-CM

## 2024-01-29 DIAGNOSIS — B00.9 HSV-1 INFECTION: ICD-10-CM

## 2024-01-29 DIAGNOSIS — K59.09 OTHER CONSTIPATION: ICD-10-CM

## 2024-01-29 PROCEDURE — 0502F SUBSEQUENT PRENATAL CARE: CPT | Performed by: NURSE PRACTITIONER

## 2024-01-29 NOTE — PROGRESS NOTES
OB follow up < 20 weeks    CC:  Here for prenatal follow up    Suha Raines is a 33 y.o.  10w6d being seen today for her obstetrical visit.  She is new to me - no.  Patient reports nausea and vomiting. Taking +PNV.  Here with significant other.      Review of Systems  Genitourinary: Neg for cramping, vaginal bleeding, SROM, or dysuria.       /72   Wt 88.5 kg (195 lb 3.2 oz)   LMP 10/30/2023 (Exact Date)   BMI 32.48 kg/m²     FHT: + FCA    Uterine Size:    Assessment    1) Pregnancy at 10w6d     2) CF/FX/SMA neg in previous pregnancy; MT21 drawn today with new OB labs (pt declined at her new OB appt)    3) H/O 8.13# infant- Watch growth 3rd trimester.      4)  short-term interval between pregnancies-  2023; check CL 14-16wga     5) hx of GHTN- ? 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      Rosalina Parker, APRN     2024  16:34 EST

## 2024-01-31 ENCOUNTER — HOSPITAL ENCOUNTER (OUTPATIENT)
Facility: HOSPITAL | Age: 34
Setting detail: OBSERVATION
Discharge: HOME | End: 2024-02-01
Attending: EMERGENCY MEDICINE | Admitting: OBSTETRICS & GYNECOLOGY
Payer: COMMERCIAL

## 2024-01-31 DIAGNOSIS — O21.0 HYPEREMESIS GRAVIDARUM: Primary | ICD-10-CM

## 2024-01-31 DIAGNOSIS — R06.02 SHORT OF BREATH ON EXERTION: ICD-10-CM

## 2024-01-31 DIAGNOSIS — R00.2 PALPITATIONS: ICD-10-CM

## 2024-01-31 LAB
ABO GROUP BLD: NORMAL
ALBUMIN SERPL-MCNC: 4.1 G/DL (ref 3.5–5.2)
ALBUMIN SERPL-MCNC: 4.3 G/DL (ref 3.9–4.9)
ALBUMIN/GLOB SERPL: 1.5 G/DL
ALBUMIN/GLOB SERPL: 1.6 {RATIO} (ref 1.2–2.2)
ALP SERPL-CCNC: 47 IU/L (ref 44–121)
ALP SERPL-CCNC: 48 U/L (ref 39–117)
ALT SERPL W P-5'-P-CCNC: 24 U/L (ref 1–33)
ALT SERPL-CCNC: 23 IU/L (ref 0–32)
ANION GAP SERPL CALCULATED.3IONS-SCNC: 10.4 MMOL/L (ref 5–15)
AST SERPL-CCNC: 18 U/L (ref 1–32)
AST SERPL-CCNC: 32 IU/L (ref 0–40)
BACTERIA UR QL AUTO: ABNORMAL /HPF
BASOPHILS # BLD AUTO: 0 X10E3/UL (ref 0–0.2)
BASOPHILS # BLD AUTO: 0.05 10*3/MM3 (ref 0–0.2)
BASOPHILS NFR BLD AUTO: 0.6 % (ref 0–1.5)
BASOPHILS NFR BLD AUTO: 1 %
BILIRUB SERPL-MCNC: 0.5 MG/DL (ref 0–1.2)
BILIRUB SERPL-MCNC: 0.5 MG/DL (ref 0–1.2)
BILIRUB UR QL STRIP: NEGATIVE
BLD GP AB SCN SERPL QL: NEGATIVE
BUN SERPL-MCNC: 11 MG/DL (ref 6–20)
BUN SERPL-MCNC: 11 MG/DL (ref 6–20)
BUN/CREAT SERPL: 19 (ref 9–23)
BUN/CREAT SERPL: 19.3 (ref 7–25)
CALCIUM SERPL-MCNC: 9.2 MG/DL (ref 8.7–10.2)
CALCIUM SPEC-SCNC: 9.3 MG/DL (ref 8.6–10.5)
CHLORIDE SERPL-SCNC: 100 MMOL/L (ref 96–106)
CHLORIDE SERPL-SCNC: 101 MMOL/L (ref 98–107)
CLARITY UR: CLEAR
CO2 SERPL-SCNC: 16 MMOL/L (ref 20–29)
CO2 SERPL-SCNC: 24.6 MMOL/L (ref 22–29)
COLOR UR: YELLOW
CREAT SERPL-MCNC: 0.57 MG/DL (ref 0.57–1)
CREAT SERPL-MCNC: 0.57 MG/DL (ref 0.57–1)
D DIMER PPP FEU-MCNC: 0.6 MCGFEU/ML (ref 0–0.5)
DEPRECATED RDW RBC AUTO: 38.5 FL (ref 37–54)
EGFRCR SERPLBLD CKD-EPI 2021: 124 ML/MIN/1.73
EGFRCR SERPLBLD CKD-EPI 2021: 124 ML/MIN/1.73
EOSINOPHIL # BLD AUTO: 0.1 X10E3/UL (ref 0–0.4)
EOSINOPHIL # BLD AUTO: 0.16 10*3/MM3 (ref 0–0.4)
EOSINOPHIL NFR BLD AUTO: 1.9 % (ref 0.3–6.2)
EOSINOPHIL NFR BLD AUTO: 2 %
ERYTHROCYTE [DISTWIDTH] IN BLOOD BY AUTOMATED COUNT: 11.8 % (ref 12.3–15.4)
ERYTHROCYTE [DISTWIDTH] IN BLOOD BY AUTOMATED COUNT: 12.3 % (ref 11.7–15.4)
FLUAV RNA RESP QL NAA+PROBE: NOT DETECTED
FLUBV RNA RESP QL NAA+PROBE: NOT DETECTED
GLOBULIN SER CALC-MCNC: 2.7 G/DL (ref 1.5–4.5)
GLOBULIN UR ELPH-MCNC: 2.8 GM/DL
GLUCOSE SERPL-MCNC: 65 MG/DL (ref 70–99)
GLUCOSE SERPL-MCNC: 89 MG/DL (ref 65–99)
GLUCOSE UR STRIP-MCNC: NEGATIVE MG/DL
HBA1C MFR BLD: 5.3 % (ref 4.8–5.6)
HBV SURFACE AG SERPL QL IA: NEGATIVE
HCG INTACT+B SERPL-ACNC: NORMAL MIU/ML
HCT VFR BLD AUTO: 37.1 % (ref 34–46.6)
HCT VFR BLD AUTO: 37.4 % (ref 34–46.6)
HCV IGG SERPL QL IA: NON REACTIVE
HGB A MFR BLD ELPH: 97.4 % (ref 96.4–98.8)
HGB A2 MFR BLD ELPH: 2.6 % (ref 1.8–3.2)
HGB BLD-MCNC: 12.9 G/DL (ref 11.1–15.9)
HGB BLD-MCNC: 13.2 G/DL (ref 12–15.9)
HGB F MFR BLD ELPH: 0 % (ref 0–2)
HGB FRACT BLD-IMP: NORMAL
HGB S MFR BLD ELPH: 0 %
HGB UR QL STRIP.AUTO: NEGATIVE
HIV 1+2 AB+HIV1 P24 AG SERPL QL IA: NON REACTIVE
HOLD SPECIMEN: NORMAL
HOLD SPECIMEN: NORMAL
HYALINE CASTS UR QL AUTO: ABNORMAL /LPF
IMM GRANULOCYTES # BLD AUTO: 0 X10E3/UL (ref 0–0.1)
IMM GRANULOCYTES # BLD AUTO: 0.02 10*3/MM3 (ref 0–0.05)
IMM GRANULOCYTES NFR BLD AUTO: 0 %
IMM GRANULOCYTES NFR BLD AUTO: 0.2 % (ref 0–0.5)
KETONES UR QL STRIP: NEGATIVE
LEUKOCYTE ESTERASE UR QL STRIP.AUTO: ABNORMAL
LIPASE SERPL-CCNC: 28 U/L (ref 13–60)
LYMPHOCYTES # BLD AUTO: 2.1 X10E3/UL (ref 0.7–3.1)
LYMPHOCYTES # BLD AUTO: 2.32 10*3/MM3 (ref 0.7–3.1)
LYMPHOCYTES NFR BLD AUTO: 28.1 % (ref 19.6–45.3)
LYMPHOCYTES NFR BLD AUTO: 31 %
MCH RBC QN AUTO: 31.7 PG (ref 26.6–33)
MCH RBC QN AUTO: 32.1 PG (ref 26.6–33)
MCHC RBC AUTO-ENTMCNC: 34.5 G/DL (ref 31.5–35.7)
MCHC RBC AUTO-ENTMCNC: 35.6 G/DL (ref 31.5–35.7)
MCV RBC AUTO: 90.3 FL (ref 79–97)
MCV RBC AUTO: 92 FL (ref 79–97)
MONOCYTES # BLD AUTO: 0.5 X10E3/UL (ref 0.1–0.9)
MONOCYTES # BLD AUTO: 0.76 10*3/MM3 (ref 0.1–0.9)
MONOCYTES NFR BLD AUTO: 8 %
MONOCYTES NFR BLD AUTO: 9.2 % (ref 5–12)
NEUTROPHILS # BLD AUTO: 3.9 X10E3/UL (ref 1.4–7)
NEUTROPHILS NFR BLD AUTO: 4.95 10*3/MM3 (ref 1.7–7)
NEUTROPHILS NFR BLD AUTO: 58 %
NEUTROPHILS NFR BLD AUTO: 60 % (ref 42.7–76)
NITRITE UR QL STRIP: NEGATIVE
NRBC BLD AUTO-RTO: 0 /100 WBC (ref 0–0.2)
NT-PROBNP SERPL-MCNC: <36 PG/ML (ref 0–450)
PH UR STRIP.AUTO: 5.5 [PH] (ref 4.5–8)
PLATELET # BLD AUTO: 279 10*3/MM3 (ref 140–450)
PLATELET # BLD AUTO: 286 X10E3/UL (ref 150–450)
PMV BLD AUTO: 10.6 FL (ref 6–12)
POTASSIUM SERPL-SCNC: 3.7 MMOL/L (ref 3.5–5.2)
POTASSIUM SERPL-SCNC: 7.3 MMOL/L (ref 3.5–5.2)
PROT SERPL-MCNC: 6.9 G/DL (ref 6–8.5)
PROT SERPL-MCNC: 7 G/DL (ref 6–8.5)
PROT UR QL STRIP: NEGATIVE
RBC # BLD AUTO: 4.07 X10E6/UL (ref 3.77–5.28)
RBC # BLD AUTO: 4.11 10*6/MM3 (ref 3.77–5.28)
RBC # UR STRIP: ABNORMAL /HPF
REF LAB TEST METHOD: ABNORMAL
RH BLD: POSITIVE
RPR SER QL: NON REACTIVE
RUBV IGG SERPL IA-ACNC: 2.05 INDEX
SARS-COV-2 RNA RESP QL NAA+PROBE: NOT DETECTED
SODIUM SERPL-SCNC: 134 MMOL/L (ref 134–144)
SODIUM SERPL-SCNC: 136 MMOL/L (ref 136–145)
SP GR UR STRIP: 1.02 (ref 1–1.03)
SQUAMOUS #/AREA URNS HPF: ABNORMAL /HPF
TROPONIN T SERPL HS-MCNC: <6 NG/L
UROBILINOGEN UR QL STRIP: ABNORMAL
VZV IGG SER IA-ACNC: 1145 INDEX
WBC # BLD AUTO: 6.7 X10E3/UL (ref 3.4–10.8)
WBC # UR STRIP: ABNORMAL /HPF
WBC NRBC COR # BLD AUTO: 8.26 10*3/MM3 (ref 3.4–10.8)
WHOLE BLOOD HOLD COAG: NORMAL
WHOLE BLOOD HOLD SPECIMEN: NORMAL

## 2024-01-31 PROCEDURE — 87636 SARSCOV2 & INF A&B AMP PRB: CPT | Performed by: EMERGENCY MEDICINE

## 2024-01-31 PROCEDURE — 80053 COMPREHEN METABOLIC PANEL: CPT | Performed by: EMERGENCY MEDICINE

## 2024-01-31 PROCEDURE — 85379 FIBRIN DEGRADATION QUANT: CPT | Performed by: EMERGENCY MEDICINE

## 2024-01-31 PROCEDURE — G0378 HOSPITAL OBSERVATION PER HR: HCPCS

## 2024-01-31 PROCEDURE — 83690 ASSAY OF LIPASE: CPT | Performed by: EMERGENCY MEDICINE

## 2024-01-31 PROCEDURE — 84484 ASSAY OF TROPONIN QUANT: CPT | Performed by: EMERGENCY MEDICINE

## 2024-01-31 PROCEDURE — 93010 ELECTROCARDIOGRAM REPORT: CPT | Performed by: INTERNAL MEDICINE

## 2024-01-31 PROCEDURE — 99284 EMERGENCY DEPT VISIT MOD MDM: CPT

## 2024-01-31 PROCEDURE — 25010000002 ONDANSETRON PER 1 MG: Performed by: EMERGENCY MEDICINE

## 2024-01-31 PROCEDURE — 93005 ELECTROCARDIOGRAM TRACING: CPT | Performed by: EMERGENCY MEDICINE

## 2024-01-31 PROCEDURE — 96374 THER/PROPH/DIAG INJ IV PUSH: CPT

## 2024-01-31 PROCEDURE — 25810000003 SODIUM CHLORIDE 0.9 % SOLUTION: Performed by: EMERGENCY MEDICINE

## 2024-01-31 PROCEDURE — 85025 COMPLETE CBC W/AUTO DIFF WBC: CPT

## 2024-01-31 PROCEDURE — 81001 URINALYSIS AUTO W/SCOPE: CPT | Performed by: EMERGENCY MEDICINE

## 2024-01-31 PROCEDURE — 84702 CHORIONIC GONADOTROPIN TEST: CPT | Performed by: EMERGENCY MEDICINE

## 2024-01-31 PROCEDURE — 96361 HYDRATE IV INFUSION ADD-ON: CPT

## 2024-01-31 PROCEDURE — 83880 ASSAY OF NATRIURETIC PEPTIDE: CPT | Performed by: EMERGENCY MEDICINE

## 2024-01-31 RX ORDER — ONDANSETRON 2 MG/ML
8 INJECTION INTRAMUSCULAR; INTRAVENOUS ONCE
Status: COMPLETED | OUTPATIENT
Start: 2024-01-31 | End: 2024-01-31

## 2024-01-31 RX ORDER — FOLIC ACID 5 MG/ML
INJECTION, SOLUTION INTRAMUSCULAR; INTRAVENOUS; SUBCUTANEOUS
Status: COMPLETED
Start: 2024-01-31 | End: 2024-02-01

## 2024-01-31 RX ORDER — FAMOTIDINE 10 MG/ML
20 INJECTION, SOLUTION INTRAVENOUS EVERY 12 HOURS SCHEDULED
Status: DISCONTINUED | OUTPATIENT
Start: 2024-01-31 | End: 2024-02-01

## 2024-01-31 RX ORDER — THIAMINE HYDROCHLORIDE 100 MG/ML
INJECTION, SOLUTION INTRAMUSCULAR; INTRAVENOUS
Status: COMPLETED
Start: 2024-01-31 | End: 2024-02-01

## 2024-01-31 RX ORDER — SODIUM CHLORIDE, SODIUM LACTATE, POTASSIUM CHLORIDE, CALCIUM CHLORIDE 600; 310; 30; 20 MG/100ML; MG/100ML; MG/100ML; MG/100ML
150 INJECTION, SOLUTION INTRAVENOUS CONTINUOUS
Status: DISCONTINUED | OUTPATIENT
Start: 2024-01-31 | End: 2024-02-01 | Stop reason: HOSPADM

## 2024-01-31 RX ORDER — ASCORBIC ACID, VITAMIN A PALMITATE, CHOLECALCIFEROL, THIAMINE HYDROCHLORIDE, RIBOFLAVIN-5 PHOSPHATE SODIUM, PYRIDOXINE HYDROCHLORIDE, NIACINAMIDE, DEXPANTHENOL, ALPHA-TOCOPHEROL ACETATE, VITAMIN K1, FOLIC ACID, BIOTIN, CYANOCOBALAMIN 200; 3300; 200; 6; 3.6; 6; 40; 15; 10; 150; 600; 60; 5 MG/10ML; [IU]/10ML; [IU]/10ML; MG/10ML; MG/10ML; MG/10ML; MG/10ML; MG/10ML; [IU]/10ML; UG/10ML; UG/10ML; UG/10ML; UG/10ML
INJECTION, SOLUTION INTRAVENOUS
Status: COMPLETED
Start: 2024-01-31 | End: 2024-02-01

## 2024-01-31 RX ORDER — SODIUM CHLORIDE 0.9 % (FLUSH) 0.9 %
10 SYRINGE (ML) INJECTION AS NEEDED
Status: DISCONTINUED | OUTPATIENT
Start: 2024-01-31 | End: 2024-02-01 | Stop reason: HOSPADM

## 2024-01-31 RX ORDER — ONDANSETRON 2 MG/ML
8 INJECTION INTRAMUSCULAR; INTRAVENOUS EVERY 8 HOURS
Status: DISCONTINUED | OUTPATIENT
Start: 2024-01-31 | End: 2024-02-01 | Stop reason: HOSPADM

## 2024-01-31 RX ORDER — PROMETHAZINE HYDROCHLORIDE 25 MG/1
12.5 TABLET ORAL EVERY 6 HOURS PRN
Status: DISCONTINUED | OUTPATIENT
Start: 2024-01-31 | End: 2024-02-01

## 2024-01-31 RX ORDER — LIDOCAINE HYDROCHLORIDE 10 MG/ML
0.5 INJECTION, SOLUTION INFILTRATION; PERINEURAL ONCE AS NEEDED
Status: DISCONTINUED | OUTPATIENT
Start: 2024-01-31 | End: 2024-02-01 | Stop reason: HOSPADM

## 2024-01-31 RX ORDER — SODIUM CHLORIDE 0.9 % (FLUSH) 0.9 %
10 SYRINGE (ML) INJECTION EVERY 12 HOURS SCHEDULED
Status: DISCONTINUED | OUTPATIENT
Start: 2024-01-31 | End: 2024-02-01 | Stop reason: HOSPADM

## 2024-01-31 RX ADMIN — SODIUM CHLORIDE 1000 ML: 9 INJECTION, SOLUTION INTRAVENOUS at 22:11

## 2024-01-31 RX ADMIN — ONDANSETRON 8 MG: 2 INJECTION INTRAMUSCULAR; INTRAVENOUS at 22:10

## 2024-02-01 VITALS
WEIGHT: 190 LBS | OXYGEN SATURATION: 97 % | RESPIRATION RATE: 18 BRPM | HEIGHT: 65 IN | BODY MASS INDEX: 31.65 KG/M2 | SYSTOLIC BLOOD PRESSURE: 116 MMHG | DIASTOLIC BLOOD PRESSURE: 71 MMHG | HEART RATE: 67 BPM | TEMPERATURE: 98.5 F

## 2024-02-01 LAB
ALBUMIN SERPL-MCNC: 3.7 G/DL (ref 3.5–5.2)
ALBUMIN/GLOB SERPL: 1.5 G/DL
ALP SERPL-CCNC: 45 U/L (ref 39–117)
ALT SERPL W P-5'-P-CCNC: 21 U/L (ref 1–33)
ANION GAP SERPL CALCULATED.3IONS-SCNC: 5.9 MMOL/L (ref 5–15)
AST SERPL-CCNC: 19 U/L (ref 1–32)
BASOPHILS # BLD AUTO: 0.03 10*3/MM3 (ref 0–0.2)
BASOPHILS NFR BLD AUTO: 0.5 % (ref 0–1.5)
BILIRUB SERPL-MCNC: 0.7 MG/DL (ref 0–1.2)
BUN SERPL-MCNC: 6 MG/DL (ref 6–20)
BUN/CREAT SERPL: 11.8 (ref 7–25)
CALCIUM SPEC-SCNC: 8.7 MG/DL (ref 8.6–10.5)
CHLORIDE SERPL-SCNC: 105 MMOL/L (ref 98–107)
CO2 SERPL-SCNC: 23.1 MMOL/L (ref 22–29)
CREAT SERPL-MCNC: 0.51 MG/DL (ref 0.57–1)
DEPRECATED RDW RBC AUTO: 39.3 FL (ref 37–54)
EGFRCR SERPLBLD CKD-EPI 2021: 127.4 ML/MIN/1.73
EOSINOPHIL # BLD AUTO: 0.17 10*3/MM3 (ref 0–0.4)
EOSINOPHIL NFR BLD AUTO: 3 % (ref 0.3–6.2)
ERYTHROCYTE [DISTWIDTH] IN BLOOD BY AUTOMATED COUNT: 11.8 % (ref 12.3–15.4)
GLOBULIN UR ELPH-MCNC: 2.4 GM/DL
GLUCOSE SERPL-MCNC: 91 MG/DL (ref 65–99)
HCT VFR BLD AUTO: 35.1 % (ref 34–46.6)
HGB BLD-MCNC: 12.3 G/DL (ref 12–15.9)
IMM GRANULOCYTES # BLD AUTO: 0.01 10*3/MM3 (ref 0–0.05)
IMM GRANULOCYTES NFR BLD AUTO: 0.2 % (ref 0–0.5)
LYMPHOCYTES # BLD AUTO: 1.75 10*3/MM3 (ref 0.7–3.1)
LYMPHOCYTES NFR BLD AUTO: 30.9 % (ref 19.6–45.3)
MCH RBC QN AUTO: 31.9 PG (ref 26.6–33)
MCHC RBC AUTO-ENTMCNC: 35 G/DL (ref 31.5–35.7)
MCV RBC AUTO: 90.9 FL (ref 79–97)
MONOCYTES # BLD AUTO: 0.57 10*3/MM3 (ref 0.1–0.9)
MONOCYTES NFR BLD AUTO: 10.1 % (ref 5–12)
NEUTROPHILS NFR BLD AUTO: 3.13 10*3/MM3 (ref 1.7–7)
NEUTROPHILS NFR BLD AUTO: 55.3 % (ref 42.7–76)
PLATELET # BLD AUTO: 260 10*3/MM3 (ref 140–450)
PMV BLD AUTO: 10.3 FL (ref 6–12)
POTASSIUM SERPL-SCNC: 3.4 MMOL/L (ref 3.5–5.2)
PROT SERPL-MCNC: 6.1 G/DL (ref 6–8.5)
QT INTERVAL: 389 MS
QTC INTERVAL: 465 MS
RBC # BLD AUTO: 3.86 10*6/MM3 (ref 3.77–5.28)
SODIUM SERPL-SCNC: 134 MMOL/L (ref 136–145)
WBC NRBC COR # BLD AUTO: 5.66 10*3/MM3 (ref 3.4–10.8)

## 2024-02-01 PROCEDURE — 99238 HOSP IP/OBS DSCHRG MGMT 30/<: CPT | Performed by: NURSE PRACTITIONER

## 2024-02-01 PROCEDURE — 96376 TX/PRO/DX INJ SAME DRUG ADON: CPT

## 2024-02-01 PROCEDURE — 25810000003 LACTATED RINGERS PER 1000 ML: Performed by: OBSTETRICS & GYNECOLOGY

## 2024-02-01 PROCEDURE — G0378 HOSPITAL OBSERVATION PER HR: HCPCS

## 2024-02-01 PROCEDURE — 85025 COMPLETE CBC W/AUTO DIFF WBC: CPT | Performed by: OBSTETRICS & GYNECOLOGY

## 2024-02-01 PROCEDURE — 96375 TX/PRO/DX INJ NEW DRUG ADDON: CPT

## 2024-02-01 PROCEDURE — 80053 COMPREHEN METABOLIC PANEL: CPT | Performed by: OBSTETRICS & GYNECOLOGY

## 2024-02-01 PROCEDURE — 25010000002 ONDANSETRON PER 1 MG: Performed by: OBSTETRICS & GYNECOLOGY

## 2024-02-01 PROCEDURE — 25010000002 THIAMINE PER 100 MG

## 2024-02-01 PROCEDURE — 96361 HYDRATE IV INFUSION ADD-ON: CPT

## 2024-02-01 RX ORDER — FAMOTIDINE 20 MG/1
20 TABLET, FILM COATED ORAL 2 TIMES DAILY
Qty: 60 TABLET | Refills: 0 | Status: SHIPPED | OUTPATIENT
Start: 2024-02-01

## 2024-02-01 RX ORDER — FAMOTIDINE 20 MG/1
20 TABLET, FILM COATED ORAL
Status: DISCONTINUED | OUTPATIENT
Start: 2024-02-01 | End: 2024-02-01 | Stop reason: HOSPADM

## 2024-02-01 RX ORDER — ACETAMINOPHEN 500 MG
1000 TABLET ORAL ONCE
Status: COMPLETED | OUTPATIENT
Start: 2024-02-01 | End: 2024-02-01

## 2024-02-01 RX ORDER — PROMETHAZINE HYDROCHLORIDE 12.5 MG/1
12.5 TABLET ORAL EVERY 6 HOURS PRN
Qty: 30 TABLET | Refills: 1 | Status: SHIPPED | OUTPATIENT
Start: 2024-02-01

## 2024-02-01 RX ADMIN — FAMOTIDINE 20 MG: 10 INJECTION, SOLUTION INTRAVENOUS at 00:02

## 2024-02-01 RX ADMIN — THIAMINE HYDROCHLORIDE 100 MG: 100 INJECTION, SOLUTION INTRAMUSCULAR; INTRAVENOUS at 00:06

## 2024-02-01 RX ADMIN — ASCORBIC ACID, VITAMIN A PALMITATE, CHOLECALCIFEROL, THIAMINE HYDROCHLORIDE, RIBOFLAVIN-5 PHOSPHATE SODIUM, PYRIDOXINE HYDROCHLORIDE, NIACINAMIDE, DEXPANTHENOL, ALPHA-TOCOPHEROL ACETATE, VITAMIN K1, FOLIC ACID, BIOTIN, CYANOCOBALAMIN 10 ML: 200; 3300; 200; 6; 3.6; 6; 40; 15; 10; 150; 600; 60; 5 INJECTION, SOLUTION INTRAVENOUS at 00:07

## 2024-02-01 RX ADMIN — ACETAMINOPHEN 1000 MG: 500 TABLET ORAL at 06:34

## 2024-02-01 RX ADMIN — FOLIC ACID 1 MG: 5 INJECTION, SOLUTION INTRAMUSCULAR; INTRAVENOUS; SUBCUTANEOUS at 00:05

## 2024-02-01 RX ADMIN — ACETAMINOPHEN 1000 MG: 500 TABLET ORAL at 12:34

## 2024-02-01 RX ADMIN — SODIUM CHLORIDE, POTASSIUM CHLORIDE, SODIUM LACTATE AND CALCIUM CHLORIDE 150 ML/HR: 600; 310; 30; 20 INJECTION, SOLUTION INTRAVENOUS at 00:08

## 2024-02-01 RX ADMIN — ONDANSETRON 8 MG: 2 INJECTION INTRAMUSCULAR; INTRAVENOUS at 06:30

## 2024-02-01 RX ADMIN — SODIUM CHLORIDE, POTASSIUM CHLORIDE, SODIUM LACTATE AND CALCIUM CHLORIDE 150 ML/HR: 600; 310; 30; 20 INJECTION, SOLUTION INTRAVENOUS at 06:41

## 2024-02-01 NOTE — PROGRESS NOTES
Adult Nutrition  Assessment/PES    Patient Name:  Suha Raines  YOB: 1990  MRN: [id number]  Admit Date:  1/31/2024    Assessment Date:  2/1/2024    Comments:   Agree with Reg diet as tolerated.    Edu given as below on hyperemesis nutrition    Will remain available.       Reason for Assessment       Row Name 02/01/24 1305          Reason for Assessment    Reason For Assessment identified at risk by screening criteria  hyperemesis     Diagnosis other (see comments)  Hyperemesis 10+ weeks IUP                    Nutrition/Diet History       Row Name 02/01/24 1307          Nutrition/Diet History    Typical Intake (Food/Fluid/EN/PN) Spoke w pt at bedside, female visitor & toddler with mom in bed. NKFA. Reg diet PTA. Been nauseated during pregnancy. No significant wt loss, less than 5#.                    Labs/Tests/Procedures/Meds       Row Name 02/01/24 1308          Labs/Procedures/Meds    Lab Results Reviewed reviewed     Lab Results Comments K 3.4 L        Diagnostic Tests/Procedures    Diagnostic Test/Procedure Reviewed reviewed        Medications    Pertinent Medications Reviewed reviewed     Pertinent Medications Comments zofran                      Estimated/Assessed Needs - Anthropometrics       Row Name 02/01/24 1308          Anthropometrics    Weight for Calculation 86.2 kg (190 lb)        Estimated/Assessed Needs    Additional Documentation Estimated Calorie Needs (Group);Fluid Requirements (Group);Protein Requirements (Group)        Estimated Calorie Needs    Estimated Calorie Requirement (kcal/day) 2047 kcal ( mifflin x 1.3)     Estimated Calorie Need Method Dennison-St Jeor        Protein Requirements    Est Protein Requirement Amount (gms/kg) 1.0 gm protein  86 gm pro        Fluid Requirements    Estimated Fluid Requirement Method RDA Method  2047 ml                    Nutrition Prescription Ordered       Row Name 02/01/24 1309          Nutrition Prescription PO    Current PO Diet Regular                     Evaluation of Received Nutrient/Fluid Intake       Row Name 02/01/24 1309          Fluid Intake Evaluation    Oral Fluid (mL) --  insufficient data        PO Evaluation    Number of Days PO Intake Evaluated Insufficient Data                       Problem/Interventions:   Problem 1       Row Name 02/01/24 1311          Nutrition Diagnoses Problem 1    Problem 1 Other (comment)  Inadequate energy intake related to hyperemesis with pregnancy as evidenced by N/V                          Intervention Goal       Row Name 02/01/24 1311          Intervention Goal    General Meet nutritional needs for age/condition     PO Establish PO;PO intake (%);Tolerate PO     PO Intake % 50 %                    Nutrition Intervention       Row Name 02/01/24 1311          Nutrition Intervention    RD/Tech Action Follow Tx progress;Encourage intake                      Education/Evaluation       Row Name 02/01/24 1312          Education    Education Provided education regarding;Education topics  Edu on eating before getting out of bed like crackers if sick in the am. Edu on avoiding spice, high fat. Edu cold, dry, low aroma foods when feeling sick. Eat what sounds good. Eat smaller amounts more often. Limit fluids during eating.     Provided education regarding --  Drink adequately around meals.     Education Topics Hyperemesis  Hyperemsis Nutrition therapy provided w Rd contact        Monitor/Evaluation    Monitor Per protocol;I&O;PO intake;Pertinent labs;Weight;Symptoms     Education Follow-up Other (comment)  pt verbalized understanding                     Electronically signed by:  Angi Connell RD  02/01/24 13:14 EST

## 2024-02-01 NOTE — H&P
"Patient Care Team:  Provider, No Known as PCP - General  Sravanthi Obando MD as Consulting Physician (Obstetrics and Gynecology)  Tayo Carrasco MD as Consulting Physician (Obstetrics and Gynecology)  Nav Pastrana MD as Consulting Physician (Obstetrics and Gynecology)  Noreen Taylor APRN as Nurse Practitioner (Obstetrics and Gynecology)        Chief Complaint:  nausea and vomiting    Subjective    Interval History and ROS:     33 y.o. female 11w2d gestation     Patient States \"haven't been able to keep anything down\"; Zofran at home made it worse  Patient Complaints: N/V with headache, palpitations, SOA  Patient Denies:  fever/chills, diarrhea, abdominal pain, vaginal bleeding  History taken from: patient      Objective    Vital Signs  Temp:  [98.2 °F (36.8 °C)-99.5 °F (37.5 °C)] 99.5 °F (37.5 °C)  Heart Rate:  [71-76] 76  Resp:  [16-18] 16  BP: (113-146)/(62-98) 126/62    Flowsheet Rows      Flowsheet Row First Filed Value   Admission Height 165.1 cm (65\") Documented at 01/31/2024 2135   Admission Weight 86.2 kg (190 lb) Documented at 01/31/2024 2135            Physical Exam:  General Appearance: alert, appears stated age, and cooperative  Head: normocephalic, without obvious abnormality and atraumatic  Lungs: clear to auscultation, respirations regular, respirations even, and respirations unlabored  Heart: regular rhythm & normal rate, normal S1, S2, and no murmur, no gallop, no rub  Abdomen: no masses, no hepatomegaly, no splenomegaly, soft non-tender, no guarding, no rebound tenderness, and bowel sounds hypoactive  Extremities: moves extremities well, no edema, no cyanosis, and no redness  Neurologic: Mental Status orientated to person, place, time and situation  Psych: normal    Results Review:     I reviewed the patient's new clinical results.    Lab Results (last 24 hours)       Procedure Component Value Units Date/Time    Comprehensive Metabolic Panel [[id number]]  " (Abnormal) Collected: 02/01/24 0636    Specimen: Blood Updated: 02/01/24 0711     Glucose 91 mg/dL      BUN 6 mg/dL      Creatinine 0.51 mg/dL      Sodium 134 mmol/L      Potassium 3.4 mmol/L      Chloride 105 mmol/L      CO2 23.1 mmol/L      Calcium 8.7 mg/dL      Total Protein 6.1 g/dL      Albumin 3.7 g/dL      ALT (SGPT) 21 U/L      AST (SGOT) 19 U/L      Alkaline Phosphatase 45 U/L      Total Bilirubin 0.7 mg/dL      Globulin 2.4 gm/dL      A/G Ratio 1.5 g/dL      BUN/Creatinine Ratio 11.8     Anion Gap 5.9 mmol/L      eGFR 127.4 mL/min/1.73     Narrative:      GFR Normal >60  Chronic Kidney Disease <60  Kidney Failure <15      CBC & Differential [[id number]]  (Abnormal) Collected: 02/01/24 0636    Specimen: Blood Updated: 02/01/24 0708    Narrative:      The following orders were created for panel order CBC & Differential.  Procedure                               Abnormality         Status                     ---------                               -----------         ------                     CBC Auto Differential[[id number]]        Abnormal            Final result                 Please view results for these tests on the individual orders.    CBC Auto Differential [[id number]]  (Abnormal) Collected: 02/01/24 0636    Specimen: Blood Updated: 02/01/24 0708     WBC 5.66 10*3/mm3      RBC 3.86 10*6/mm3      Hemoglobin 12.3 g/dL      Hematocrit 35.1 %      MCV 90.9 fL      MCH 31.9 pg      MCHC 35.0 g/dL      RDW 11.8 %      RDW-SD 39.3 fl      MPV 10.3 fL      Platelets 260 10*3/mm3      Neutrophil % 55.3 %      Lymphocyte % 30.9 %      Monocyte % 10.1 %      Eosinophil % 3.0 %      Basophil % 0.5 %      Immature Grans % 0.2 %      Neutrophils, Absolute 3.13 10*3/mm3      Lymphocytes, Absolute 1.75 10*3/mm3      Monocytes, Absolute 0.57 10*3/mm3      Eosinophils, Absolute 0.17 10*3/mm3      Basophils, Absolute 0.03 10*3/mm3      Immature Grans, Absolute 0.01 10*3/mm3     BNP [[id number]]  (Normal) Collected:  01/31/24 2141    Specimen: Blood Updated: 01/31/24 2346     proBNP <36.0 pg/mL     Narrative:      This assay is used as an aid in the diagnosis of individuals suspected of having heart failure. It can be used as an aid in the diagnosis of acute decompensated heart failure (ADHF) in patients presenting with signs and symptoms of ADHF to the emergency department (ED). In addition, NT-proBNP of <300 pg/mL indicates ADHF is not likely.    Age Range Result Interpretation  NT-proBNP Concentration (pg/mL:      <50             Positive            >450                   Gray                 300-450                    Negative             <300    50-75           Positive            >900                  Gray                300-900                  Negative            <300      >75             Positive            >1800                  Gray                300-1800                  Negative            <300    Single High Sensitivity Troponin T [[id number]]  (Normal) Collected: 01/31/24 2141    Specimen: Blood Updated: 01/31/24 2339     HS Troponin T <6 ng/L     Narrative:      High Sensitive Troponin T Reference Range:  <14.0 ng/L- Negative Female for AMI  <22.0 ng/L- Negative Male for AMI  >=14 - Abnormal Female indicating possible myocardial injury.  >=22 - Abnormal Male indicating possible myocardial injury.   Clinicians would have to utilize clinical acumen, EKG, Troponin, and serial changes to determine if it is an Acute Myocardial Infarction or myocardial injury due to an underlying chronic condition.         D-dimer, Quantitative [[id number]]  (Abnormal) Collected: 01/31/24 2141    Specimen: Blood Updated: 01/31/24 2312     D-Dimer, Quantitative 0.60 MCGFEU/mL     Narrative:      According to the assay 's published package insert, a normal (<0.50 MCGFEU/mL) D-dimer result in conjunction with a non-high clinical probability assessment, excludes deep vein thrombosis (DVT) and pulmonary embolism (PE) with high  "sensitivity.    D-dimer values increase with age and this can make VTE exclusion of an older population difficult. To address this, the [employer], based on best available evidence and recent guidelines, recommends that clinicians use age-adjusted D-dimer thresholds in patients greater than 50 years of age with: a) a low probability of PE who do not meet all Pulmonary Embolism Rule Out Criteria, or b) in those with intermediate probability of PE.   The formula for an age-adjusted D-dimer cut-off is \"age/100\".  For example, a 60 year old patient would have an age-adjusted cut-off of 0.60 MCGFEU/mL and an 80 year old 0.80 MCGFEU/mL.    hCG, Quantitative, Pregnancy [[id number]] Collected: 01/31/24 2141    Specimen: Blood Updated: 01/31/24 2251     HCG Quantitative 10,000.00 mIU/mL     Narrative:      HCG Ranges by Gestational Age    Females - non-pregnant premenopausal   </= 1mIU/mL HCG  Females - postmenopausal               </= 7mIU/mL HCG    3 Weeks       5.4   -      72 mIU/mL  4 Weeks      10.2   -     708 mIU/mL  5 Weeks       217   -   8,245 mIU/mL  6 Weeks       152   -  32,177 mIU/mL  7 Weeks     4,059   - 153,767 mIU/mL  8 Weeks    31,366   - 149,094 mIU/mL  9 Weeks    59,109   - 135,901 mIU/mL  10 Weeks   44,186   - 170,409 mIU/mL  12 Weeks   27,107   - 201,615 mIU/mL  14 Weeks   24,302   -  93,646 mIU/mL  15 Weeks   12,540   -  69,747 mIU/mL  16 Weeks    8,904   -  55,332 mIU/mL  17 Weeks    8,240   -  51,793 mIU/mL  18 Weeks    9,649   -  55,271 mIU/mL      [facility] Draw [[id number]] Collected: 01/31/24 2141    Specimen: Blood Updated: 01/31/24 2246    Narrative:      The following orders were created for panel order [facility] Draw.  Procedure                               Abnormality         Status                     ---------                               -----------         ------                     Green Top (Gel)[[id number]]                                  Final result             "   Lavender Top[[id number]]                                     Final result               Gold Top - SST[[id number]]                                   Final result               Light Blue Top[[id number]]                                   Final result                 Please view results for these tests on the individual orders.    Green Top (Gel) [[id number]] Collected: 01/31/24 2141    Specimen: Blood Updated: 01/31/24 2246     Extra Tube Hold for add-ons.     Comment: Auto resulted.       Lavender Top [[id number]] Collected: 01/31/24 2141    Specimen: Blood Updated: 01/31/24 2246     Extra Tube hold for add-on     Comment: Auto resulted       Gold Top - SST [[id number]] Collected: 01/31/24 2141    Specimen: Blood Updated: 01/31/24 2246     Extra Tube Hold for add-ons.     Comment: Auto resulted.       Light Blue Top [[id number]] Collected: 01/31/24 2141    Specimen: Blood Updated: 01/31/24 2246     Extra Tube Hold for add-ons.     Comment: Auto resulted       Comprehensive Metabolic Panel [[id number]] Collected: 01/31/24 2141    Specimen: Blood Updated: 01/31/24 2219     Glucose 89 mg/dL      BUN 11 mg/dL      Creatinine 0.57 mg/dL      Sodium 136 mmol/L      Potassium 3.7 mmol/L      Chloride 101 mmol/L      CO2 24.6 mmol/L      Calcium 9.3 mg/dL      Total Protein 6.9 g/dL      Albumin 4.1 g/dL      ALT (SGPT) 24 U/L      AST (SGOT) 18 U/L      Alkaline Phosphatase 48 U/L      Total Bilirubin 0.5 mg/dL      Globulin 2.8 gm/dL      A/G Ratio 1.5 g/dL      BUN/Creatinine Ratio 19.3     Anion Gap 10.4 mmol/L      eGFR 124.0 mL/min/1.73     Narrative:      GFR Normal >60  Chronic Kidney Disease <60  Kidney Failure <15      Lipase [[id number]]  (Normal) Collected: 01/31/24 2141    Specimen: Blood Updated: 01/31/24 2219     Lipase 28 U/L     Urinalysis, Microscopic Only - Urine, Clean Catch [[id number]]  (Abnormal) Collected: 01/31/24 2143    Specimen: Urine, Clean Catch Updated: 01/31/24 2219     RBC, UA None Seen /HPF       WBC, UA 3-5 /HPF      Bacteria, UA 1+ /HPF      Squamous Epithelial Cells, UA 13-20 /HPF      Hyaline Casts, UA None Seen /LPF      Methodology Manual Light Microscopy    COVID PRE-OP / PRE-PROCEDURE SCREENING ORDER (NO ISOLATION) - Swab, Nasopharynx [[id number]]  (Normal) Collected: 01/31/24 2144    Specimen: Swab from Nasopharynx Updated: 01/31/24 2217    Narrative:      The following orders were created for panel order COVID PRE-OP / PRE-PROCEDURE SCREENING ORDER (NO ISOLATION) - Swab, Nasopharynx.  Procedure                               Abnormality         Status                     ---------                               -----------         ------                     COVID-19 and FLU A/B PCR...[[id number]]  Normal              Final result                 Please view results for these tests on the individual orders.    COVID-19 and FLU A/B PCR, 1 HR TAT - Swab, Nasopharynx [[id number]]  (Normal) Collected: 01/31/24 2144    Specimen: Swab from Nasopharynx Updated: 01/31/24 2217     COVID19 Not Detected     Influenza A PCR Not Detected     Influenza B PCR Not Detected    Narrative:      Fact sheet for providers: https://www.fda.gov/media/356491/download    Fact sheet for patients: https://www.fda.gov/media/425950/download    Test performed by PCR.    Urinalysis With Microscopic If Indicated (No Culture) - Urine, Clean Catch [[id number]]  (Abnormal) Collected: 01/31/24 2143    Specimen: Urine, Clean Catch Updated: 01/31/24 2203     Color, UA Yellow     Appearance, UA Clear     pH, UA 5.5     Specific Gravity, UA 1.022     Comment: Result obtained by Refractometer        Glucose, UA Negative     Ketones, UA Negative     Bilirubin, UA Negative     Blood, UA Negative     Protein, UA Negative     Leuk Esterase, UA Trace     Nitrite, UA Negative     Urobilinogen, UA 0.2 E.U./dL    CBC & Differential [[id number]]  (Abnormal) Collected: 01/31/24 2141    Specimen: Blood Updated: 01/31/24 2150    Narrative:      The following  orders were created for panel order CBC & Differential.  Procedure                               Abnormality         Status                     ---------                               -----------         ------                     CBC Auto Differential[[id number]]        Abnormal            Final result                 Please view results for these tests on the individual orders.    CBC Auto Differential [[id number]]  (Abnormal) Collected: 01/31/24 2141    Specimen: Blood Updated: 01/31/24 2150     WBC 8.26 10*3/mm3      RBC 4.11 10*6/mm3      Hemoglobin 13.2 g/dL      Hematocrit 37.1 %      MCV 90.3 fL      MCH 32.1 pg      MCHC 35.6 g/dL      RDW 11.8 %      RDW-SD 38.5 fl      MPV 10.6 fL      Platelets 279 10*3/mm3      Neutrophil % 60.0 %      Lymphocyte % 28.1 %      Monocyte % 9.2 %      Eosinophil % 1.9 %      Basophil % 0.6 %      Immature Grans % 0.2 %      Neutrophils, Absolute 4.95 10*3/mm3      Lymphocytes, Absolute 2.32 10*3/mm3      Monocytes, Absolute 0.76 10*3/mm3      Eosinophils, Absolute 0.16 10*3/mm3      Basophils, Absolute 0.05 10*3/mm3      Immature Grans, Absolute 0.02 10*3/mm3      nRBC 0.0 /100 WBC             Imaging Results (Last 24 Hours)       ** No results found for the last 24 hours. **              ECG reviewed personally by physician  ECG/EMG Results (most recent)       Procedure Component Value Units Date/Time    ECG 12 Lead Other; Palpitations [[id number]] Collected: 01/31/24 2318     Updated: 01/31/24 2319     QT Interval 389 ms      QTC Interval 465 ms     Narrative:      HEART RATE= 86  bpm  RR Interval= 700  ms  UT Interval= 151  ms  P Horizontal Axis= 19  deg  P Front Axis= 31  deg  QRSD Interval= 89  ms  QT Interval= 389  ms  QTcB= 465  ms  QRS Axis= 18  deg  T Wave Axis= 10  deg  - NORMAL ECG -  Sinus rhythm  Electronically Signed By:   Date and Time of Study: 2024-01-31 23:18:35            Medication Review:   I have reviewed the patient's current medication list    Current  Facility-Administered Medications:     famotidine (PEPCID) injection 20 mg, 20 mg, Intravenous, Q12H, Sravanthi Obando MD, 20 mg at 02/01/24 0002    thiamine (B-1) 100 mg, folic acid 1 mg in lactated ringers 1,000 mL infusion, 150 mL/hr, Intravenous, Once, Last Rate: 150 mL/hr at 02/01/24 0024, 150 mL/hr at 02/01/24 0024 **FOLLOWED BY** lactated ringers infusion, 150 mL/hr, Intravenous, Continuous, Sravanthi Obando MD, Last Rate: 150 mL/hr at 02/01/24 0641, 150 mL/hr at 02/01/24 0641    lidocaine (XYLOCAINE) 1 % injection 0.5 mL, 0.5 mL, Intradermal, Once PRN, Sravanthi Obando MD    ondansetron (ZOFRAN) injection 8 mg, 8 mg, Intravenous, Q8H, Sravanthi Obando MD, 8 mg at 02/01/24 0630    promethazine (PHENERGAN) IVPB 12.5 mg, 12.5 mg, Intravenous, Q6H PRN, Rosalina Parker APRN    sodium chloride 0.9 % flush 10 mL, 10 mL, Intravenous, PRN, Alonzo Campuzano MD    sodium chloride 0.9 % flush 10 mL, 10 mL, Intravenous, Q12H, Sravanthi Obando MD      Assessment & Plan     Assessment  Hyperemesis      Plan  Continue IVF with IV Zofran and Pepcid  Advance to solid diet  Headache- Continue Tylenol PRN  Palpitations- EKG in ED WNL; resolved  SOA- hx of asthma; has not used inhaler; PE benign      Plan for disposition:home later today if tolerates solid food    SUNDEEP Boyle  02/01/24  09:34 EST      Time: More than 50% of time spent in counseling and coordination of care:  Total face-to-face/floor time 20 min.  Time spent in counseling 15 min.

## 2024-02-01 NOTE — ED PROVIDER NOTES
Subjective   History of Present Illness    Chief complaint: Vomiting    Location: Home    Quality/Severity: Multiple episodes, nonbloody, nonbilious    Timing/Onset/Duration: Worse tonight    Modifying Factors: Worse with food and drink    Associated Symptoms: Mild headache.  No fever chills or cough.  No sore throat earache or nasal congestion.  No chest pain.  Patient complains of some mild shortness of breath.  No abdominal pain.  No diarrhea or burning when she urinates.  No vaginal bleeding or discharges.    Narrative: This 33 Y female who is 11 weeks pregnant presents with vomiting.  Patient states she has had nausea and vomiting throughout the whole pregnancy.  It got worse today.  She has had an ultrasound with this pregnancy that has been unremarkable.    PCP: [facility].    Review of Systems      Past Medical History:   Diagnosis Date    Abdominal cramps     Abnormal Pap smear of cervix     Asthma     Gestational hypertension, antepartum 02/02/2023    Varicella     Weight loss        No Known Allergies    Past Surgical History:   Procedure Laterality Date    TONSILLECTOMY      WISDOM TOOTH EXTRACTION         Family History   Problem Relation Age of Onset    No Known Problems Mother     No Known Problems Father     Breast cancer Neg Hx     Ovarian cancer Neg Hx     Colon cancer Neg Hx     Uterine cancer Neg Hx        Social History     Socioeconomic History    Marital status:    Tobacco Use    Smoking status: Never    Smokeless tobacco: Never   Vaping Use    Vaping Use: Never used   Substance and Sexual Activity    Alcohol use: Not Currently     Comment: casual    Drug use: Never    Sexual activity: Yes     Partners: Male           Objective   Physical Exam    Procedures           ED Course  ED Course as of 01/31/24 2351   Wed Jan 31, 2024   2206 Urinalysis shows trace leukocytes.  White blood cell count 3-5, bacteria 1+, squamous epithelial cells 13-20.  The urinalysis is otherwise  unremarkable. [RC]   2206 CBC is unremarkable. [RC]   2248 The COVID flu is negative. [RC]   2249 The CMP is unremarkable. [RC]   2316 The D-dimer is 0.60.  This is mildly elevated. [RC]   2316 The quantitative hCG is 10,000. [RC]   2316 Lipase is 28 normal. [RC]   2343 The high-sensitivity troponin is less than 6 and normal. [RC]   2350 The proBNP is less than 36 and normal. [RC]      ED Course User Index  [RC] Alonzo Campuzano MD      22:14 EST, 01/31/24:  Transvaginal ultrasound 115/24Study Result    Narrative & Impression   IMP:  There is GS with YS and viable fetal pole seen within UT. FHR 178bpm. UT anteverted. OV WNL.  Preliminary US results reviewed by SUNDEEP Machado      US note reviewed and I agree.   Sravanthi Obando MD     23:00 EST, 01/31/24:  The patient was reassessed.  She did not tolerate clear liquids.  She states that he has also been having intermittent palpitations.  She has also had increasing shortness of breath with exertion.  We will obtain an EKG, D-dimer, and BNP.                             23:13 EST, 01/31/24:  I spoke with Rosalina, on-call for OB/GYN, the patient will be brought in for observation per Dr. Obando's recommendation.  She is in stable condition.  The treatment plan was discussed with her all of her questions were answered she will be admitted in stable condition.  The provisional diagnosis is hyperemesis gravidarum.    23:21 EST, 01/31/24:  The EKG was obtained at 2318 and read by me at 2319.  The EKG shows a normal sinus rhythm with rate 86.  There is a normal axis with no hypertrophy.  The NY, QRS, and QT intervals are unremarkable.  There is no ectopy.  There is no acute ST elevation or depression.    23:22 EST, 01/31/24:  The patient's elevated D-dimer was discussed with her.  The risk versus the benefits of further imaging to evaluate this mildly elevated D-dimer given her normal sats and vital signs were discussed with her.  This time she elects to  not get a CT of the chest or VQ scan.  She understands that if she worsens she may need this imaging.          Medical Decision Making  Amount and/or Complexity of Data Reviewed  Labs: ordered.    Risk  Prescription drug management.        Final diagnoses:   Hyperemesis gravidarum   Palpitations   Short of breath on exertion       ED Disposition  ED Disposition       None            No follow-up provider specified.       Medication List      No changes were made to your prescriptions during this visit.            Alonzo Campuzano MD  01/31/24 3911       Alonzo Campuzano MD  01/31/24 1437       Alonzo Campuzano MD  01/31/24 9231

## 2024-02-01 NOTE — ED NOTES
"Nursing report ED to floor  Suha Raines  33 y.o.  female    HPI :   Chief Complaint   Patient presents with    Vomiting     Pt c/o increased vomiting over the past 48 hours with no relief from Zofran. Pt denies ABD pain,        Admitting doctor:   Sravanthi Obando MD    Admitting diagnosis:   The primary encounter diagnosis was Hyperemesis gravidarum. Diagnoses of Palpitations and Short of breath on exertion were also pertinent to this visit.    Code status:   Current Code Status       Date Active Code Status Order ID Comments User Context       Prior            Allergies:   Patient has no known allergies.    Isolation:   No active isolations    Intake and Output  No intake or output data in the 24 hours ending 01/31/24 2320    Weight:       01/31/24 2135   Weight: 86.2 kg (190 lb)       Most recent vitals:   Vitals:    01/31/24 2135 01/31/24 2202   BP: 146/98 116/79   BP Location: Right arm    Patient Position: Lying    Pulse: 71    Resp: 18    Temp: 98.2 °F (36.8 °C)    TempSrc: Oral    SpO2: 98%    Weight: 86.2 kg (190 lb)    Height: 165.1 cm (65\")        Active LDAs/IV Access:   Lines, Drains & Airways       Active LDAs       Name Placement date Placement time Site Days    Peripheral IV 01/31/24 2144 Left Antecubital 01/31/24 2144  Antecubital  less than 1                    Labs (abnormal labs have a star):   Labs Reviewed   URINALYSIS W/ MICROSCOPIC IF INDICATED (NO CULTURE) - Abnormal; Notable for the following components:       Result Value    Leuk Esterase, UA Trace (*)     All other components within normal limits   CBC WITH AUTO DIFFERENTIAL - Abnormal; Notable for the following components:    RDW 11.8 (*)     All other components within normal limits   URINALYSIS, MICROSCOPIC ONLY - Abnormal; Notable for the following components:    WBC, UA 3-5 (*)     Bacteria, UA 1+ (*)     Squamous Epithelial Cells, UA 13-20 (*)     All other components within normal limits   D-DIMER, QUANTITATIVE - " "Abnormal; Notable for the following components:    D-Dimer, Quantitative 0.60 (*)     All other components within normal limits    Narrative:     According to the assay 's published package insert, a normal (<0.50 MCGFEU/mL) D-dimer result in conjunction with a non-high clinical probability assessment, excludes deep vein thrombosis (DVT) and pulmonary embolism (PE) with high sensitivity.    D-dimer values increase with age and this can make VTE exclusion of an older population difficult. To address this, the [employer], based on best available evidence and recent guidelines, recommends that clinicians use age-adjusted D-dimer thresholds in patients greater than 50 years of age with: a) a low probability of PE who do not meet all Pulmonary Embolism Rule Out Criteria, or b) in those with intermediate probability of PE.   The formula for an age-adjusted D-dimer cut-off is \"age/100\".  For example, a 60 year old patient would have an age-adjusted cut-off of 0.60 MCGFEU/mL and an 80 year old 0.80 MCGFEU/mL.   COVID-19 AND FLU A/B, NP SWAB IN TRANSPORT MEDIA 1 HR TAT - Normal    Narrative:     Fact sheet for providers: https://www.fda.gov/media/644070/download    Fact sheet for patients: https://www.fda.gov/media/308794/download    Test performed by PCR.   LIPASE - Normal   COVID PRE-OP / PRE-PROCEDURE SCREENING ORDER (NO ISOLATION)    Narrative:     The following orders were created for panel order COVID PRE-OP / PRE-PROCEDURE SCREENING ORDER (NO ISOLATION) - Swab, Nasopharynx.  Procedure                               Abnormality         Status                     ---------                               -----------         ------                     COVID-19 and FLU A/B PCR...[[id number]]  Normal              Final result                 Please view results for these tests on the individual orders.   HCG, QUANTITATIVE, PREGNANCY    Narrative:     HCG Ranges by Gestational Age    Females - " non-pregnant premenopausal   </= 1mIU/mL HCG  Females - postmenopausal               </= 7mIU/mL HCG    3 Weeks       5.4   -      72 mIU/mL  4 Weeks      10.2   -     708 mIU/mL  5 Weeks       217   -   8,245 mIU/mL  6 Weeks       152   -  32,177 mIU/mL  7 Weeks     4,059   - 153,767 mIU/mL  8 Weeks    31,366   - 149,094 mIU/mL  9 Weeks    59,109   - 135,901 mIU/mL  10 Weeks   44,186   - 170,409 mIU/mL  12 Weeks   27,107   - 201,615 mIU/mL  14 Weeks   24,302   -  93,646 mIU/mL  15 Weeks   12,540   -  69,747 mIU/mL  16 Weeks    8,904   -  55,332 mIU/mL  17 Weeks    8,240   -  51,793 mIU/mL  18 Weeks    9,649   -  55,271 mIU/mL     RAINBOW DRAW    Narrative:     The following orders were created for panel order [place] Draw.  Procedure                               Abnormality         Status                     ---------                               -----------         ------                     Green Top (Gel)[[id number]]                                  Final result               Lavender Top[[id number]]                                     Final result               Gold Top - SST[[id number]]                                   Final result               Light Blue Top[[id number]]                                   Final result                 Please view results for these tests on the individual orders.   COMPREHENSIVE METABOLIC PANEL    Narrative:     GFR Normal >60  Chronic Kidney Disease <60  Kidney Failure <15     BNP (IN-HOUSE)   SINGLE HSTROPONIN T   CBC AND DIFFERENTIAL    Narrative:     The following orders were created for panel order CBC & Differential.  Procedure                               Abnormality         Status                     ---------                               -----------         ------                     CBC Auto Differential[[id number]]        Abnormal            Final result                 Please view results for these tests on the individual orders.   GREEN TOP   LAVENDER TOP   GOLD TOP  "- SST   LIGHT BLUE TOP       Results       Procedure Component Value Ref Range Date/Time    Single High Sensitivity Troponin T [[id number]] Collected: 01/31/24 2141    Order Status: Resulted Specimen: Blood Updated: 01/31/24 2317    BNP [[id number]] Collected: 01/31/24 2141    Order Status: Resulted Specimen: Blood Updated: 01/31/24 2312    D-dimer, Quantitative [[id number]]  (Abnormal) Collected: 01/31/24 2141    Order Status: Completed Specimen: Blood Updated: 01/31/24 2312     D-Dimer, Quantitative 0.60 0.00 - 0.50 MCGFEU/mL     Narrative:      According to the assay 's published package insert, a normal (<0.50 MCGFEU/mL) D-dimer result in conjunction with a non-high clinical probability assessment, excludes deep vein thrombosis (DVT) and pulmonary embolism (PE) with high sensitivity.    D-dimer values increase with age and this can make VTE exclusion of an older population difficult. To address this, the [employer], based on best available evidence and recent guidelines, recommends that clinicians use age-adjusted D-dimer thresholds in patients greater than 50 years of age with: a) a low probability of PE who do not meet all Pulmonary Embolism Rule Out Criteria, or b) in those with intermediate probability of PE.   The formula for an age-adjusted D-dimer cut-off is \"age/100\".  For example, a 60 year old patient would have an age-adjusted cut-off of 0.60 MCGFEU/mL and an 80 year old 0.80 MCGFEU/mL.    hCG, Quantitative, Pregnancy [[id number]] Collected: 01/31/24 2141    Order Status: Completed Specimen: Blood Updated: 01/31/24 2251     HCG Quantitative 10,000.00 mIU/mL     Narrative:      HCG Ranges by Gestational Age    Females - non-pregnant premenopausal   </= 1mIU/mL HCG  Females - postmenopausal               </= 7mIU/mL HCG    3 Weeks       5.4   -      72 mIU/mL  4 Weeks      10.2   -     708 mIU/mL  5 Weeks       217   -   8,245 mIU/mL  6 Weeks       152   -  32,177 mIU/mL  7 " Weeks     4,059   - 153,767 mIU/mL  8 Weeks    31,366   - 149,094 mIU/mL  9 Weeks    59,109   - 135,901 mIU/mL  10 Weeks   44,186   - 170,409 mIU/mL  12 Weeks   27,107   - 201,615 mIU/mL  14 Weeks   24,302   -  93,646 mIU/mL  15 Weeks   12,540   -  69,747 mIU/mL  16 Weeks    8,904   -  55,332 mIU/mL  17 Weeks    8,240   -  51,793 mIU/mL  18 Weeks    9,649   -  55,271 mIU/mL      [place] Draw [[id number]] Collected: 01/31/24 2141    Order Status: Completed Specimen: Blood Updated: 01/31/24 2246    Narrative:      The following orders were created for panel order [place] Draw.  Procedure                               Abnormality         Status                     ---------                               -----------         ------                     Green Top (Gel)[[id number]]                                  Final result               Lavender Top[[id number]]                                     Final result               Gold Top - SST[[id number]]                                   Final result               Light Blue Top[[id number]]                                   Final result                 Please view results for these tests on the individual orders.    Comprehensive Metabolic Panel [[id number]] Collected: 01/31/24 2141    Order Status: Completed Specimen: Blood Updated: 01/31/24 2219     Glucose 89 65 - 99 mg/dL      BUN 11 6 - 20 mg/dL      Creatinine 0.57 0.57 - 1.00 mg/dL      Sodium 136 136 - 145 mmol/L      Potassium 3.7 3.5 - 5.2 mmol/L      Chloride 101 98 - 107 mmol/L      CO2 24.6 22.0 - 29.0 mmol/L      Calcium 9.3 8.6 - 10.5 mg/dL      Total Protein 6.9 6.0 - 8.5 g/dL      Albumin 4.1 3.5 - 5.2 g/dL      ALT (SGPT) 24 1 - 33 U/L      AST (SGOT) 18 1 - 32 U/L      Alkaline Phosphatase 48 39 - 117 U/L      Total Bilirubin 0.5 0.0 - 1.2 mg/dL      Globulin 2.8 gm/dL      A/G Ratio 1.5 g/dL      BUN/Creatinine Ratio 19.3 7.0 - 25.0      Anion Gap 10.4 5.0 - 15.0 mmol/L      eGFR 124.0 >60.0 mL/min/1.73      Narrative:      GFR Normal >60  Chronic Kidney Disease <60  Kidney Failure <15      Lipase [[id number]]  (Normal) Collected: 01/31/24 2141    Order Status: Completed Specimen: Blood Updated: 01/31/24 2219     Lipase 28 13 - 60 U/L     Urinalysis, Microscopic Only - Urine, Clean Catch [[id number]]  (Abnormal) Collected: 01/31/24 2143    Order Status: Completed Specimen: Urine, Clean Catch Updated: 01/31/24 2219     RBC, UA None Seen None Seen, 0-2 /HPF      WBC, UA 3-5 None Seen, 0-2 /HPF      Bacteria, UA 1+ None Seen /HPF      Squamous Epithelial Cells, UA 13-20 None Seen, 0-2 /HPF      Hyaline Casts, UA None Seen None Seen /LPF      Methodology Manual Light Microscopy    COVID PRE-OP / PRE-PROCEDURE SCREENING ORDER (NO ISOLATION) - Swab, Nasopharynx [[id number]]  (Normal) Collected: 01/31/24 2144    Order Status: Completed Specimen: Swab from Nasopharynx Updated: 01/31/24 2217    Narrative:      The following orders were created for panel order COVID PRE-OP / PRE-PROCEDURE SCREENING ORDER (NO ISOLATION) - Swab, Nasopharynx.  Procedure                               Abnormality         Status                     ---------                               -----------         ------                     COVID-19 and FLU A/B PCR...[[id number]]  Normal              Final result                 Please view results for these tests on the individual orders.    COVID-19 and FLU A/B PCR, 1 HR TAT - Swab, Nasopharynx [[id number]]  (Normal) Collected: 01/31/24 2144    Order Status: Completed Specimen: Swab from Nasopharynx Updated: 01/31/24 2217     COVID19 Not Detected Not Detected - Ref. Range      Influenza A PCR Not Detected Not Detected      Influenza B PCR Not Detected Not Detected     Narrative:      Fact sheet for providers: https://www.fda.gov/media/374753/download    Fact sheet for patients: https://www.fda.gov/media/403886/download    Test performed by PCR.    Urinalysis With Microscopic If Indicated (No Culture) - Urine,  Clean Catch [[id number]]  (Abnormal) Collected: 01/31/24 2143    Order Status: Completed Specimen: Urine, Clean Catch Updated: 01/31/24 2203     Color, UA Yellow Yellow, Straw      Appearance, UA Clear Clear      pH, UA 5.5 4.5 - 8.0      Specific Gravity, UA 1.022 1.003 - 1.030      Comment: Result obtained by Refractometer        Glucose, UA Negative Negative      Ketones, UA Negative Negative      Bilirubin, UA Negative Negative      Blood, UA Negative Negative      Protein, UA Negative Negative      Leuk Esterase, UA Trace Negative      Nitrite, UA Negative Negative      Urobilinogen, UA 0.2 E.U./dL 0.2 - 1.0 E.U./dL     CBC Auto Differential [[id number]]  (Abnormal) Collected: 01/31/24 2141    Order Status: Completed Specimen: Blood Updated: 01/31/24 2150     WBC 8.26 3.40 - 10.80 10*3/mm3      RBC 4.11 3.77 - 5.28 10*6/mm3      Hemoglobin 13.2 12.0 - 15.9 g/dL      Hematocrit 37.1 34.0 - 46.6 %      MCV 90.3 79.0 - 97.0 fL      MCH 32.1 26.6 - 33.0 pg      MCHC 35.6 31.5 - 35.7 g/dL      RDW 11.8 12.3 - 15.4 %      RDW-SD 38.5 37.0 - 54.0 fl      MPV 10.6 6.0 - 12.0 fL      Platelets 279 140 - 450 10*3/mm3      Neutrophil % 60.0 42.7 - 76.0 %      Lymphocyte % 28.1 19.6 - 45.3 %      Monocyte % 9.2 5.0 - 12.0 %      Eosinophil % 1.9 0.3 - 6.2 %      Basophil % 0.6 0.0 - 1.5 %      Immature Grans % 0.2 0.0 - 0.5 %      Neutrophils, Absolute 4.95 1.70 - 7.00 10*3/mm3      Lymphocytes, Absolute 2.32 0.70 - 3.10 10*3/mm3      Monocytes, Absolute 0.76 0.10 - 0.90 10*3/mm3      Eosinophils, Absolute 0.16 0.00 - 0.40 10*3/mm3      Basophils, Absolute 0.05 0.00 - 0.20 10*3/mm3      Immature Grans, Absolute 0.02 0.00 - 0.05 10*3/mm3      nRBC 0.0 0.0 - 0.2 /100 WBC              EKG:   ECG 12 Lead Other; Palpitations   Preliminary Result   HEART RATE= 86  bpm   RR Interval= 700  ms   NM Interval= 151  ms   P Horizontal Axis= 19  deg   P Front Axis= 31  deg   QRSD Interval= 89  ms   QT Interval= 389  ms   QTcB= 465  ms    QRS Axis= 18  deg   T Wave Axis= 10  deg   - NORMAL ECG -   Sinus rhythm   Electronically Signed By:    Date and Time of Study: 2024-01-31 23:18:35          Meds given in ED:   Medications   sodium chloride 0.9 % flush 10 mL (has no administration in time range)   sodium chloride 0.9 % bolus 1,000 mL (1,000 mL Intravenous New Bag 1/31/24 2211)   ondansetron (ZOFRAN) injection 8 mg (8 mg Intravenous Given 1/31/24 2210)       Imaging results:  No radiology results for the last day    Ambulatory status:   - AT [facility]    Social issues:   Social History     Socioeconomic History    Marital status:    Tobacco Use    Smoking status: Never    Smokeless tobacco: Never   Vaping Use    Vaping Use: Never used   Substance and Sexual Activity    Alcohol use: Not Currently     Comment: casual    Drug use: Never    Sexual activity: Yes     Partners: Male       NIH Stroke Scale:         Kadie Cerrato RN  01/31/24 23:20 EST

## 2024-02-01 NOTE — PLAN OF CARE
Goal Outcome Evaluation:      Pt adequate for discharge  Problem: Hyperemesis Gravidarum  Goal: Nausea and Vomiting Relief  2/1/2024 1326 by Kristin Arreola RN  Outcome: Met  2/1/2024 1326 by Kristin Arreola RN  Outcome: Ongoing, Progressing     Problem: Adult Inpatient Plan of Care  Goal: Plan of Care Review  2/1/2024 1326 by Kristin Arreola RN  Outcome: Met  2/1/2024 1326 by Kristin Arreola RN  Outcome: Ongoing, Progressing  Goal: Patient-Specific Goal (Individualized)  2/1/2024 1326 by Kristin Arreola RN  Outcome: Met  2/1/2024 1326 by Kristin Arreola RN  Outcome: Ongoing, Progressing  Goal: Absence of Hospital-Acquired Illness or Injury  2/1/2024 1326 by Kristin Arreola RN  Outcome: Met  2/1/2024 1326 by Kristin Arreola RN  Outcome: Ongoing, Progressing  Intervention: Identify and Manage Fall Risk  Recent Flowsheet Documentation  Taken 2/1/2024 0915 by Kristin Arreola RN  Safety Promotion/Fall Prevention: safety round/check completed  Taken 2/1/2024 0730 by Kristin Arreola RN  Safety Promotion/Fall Prevention: safety round/check completed  Intervention: Prevent and Manage VTE (Venous Thromboembolism) Risk  Recent Flowsheet Documentation  Taken 2/1/2024 0915 by Kristin Arreola RN  Activity Management: up ad richa  Goal: Optimal Comfort and Wellbeing  2/1/2024 1326 by Kristin Arreola RN  Outcome: Met  2/1/2024 1326 by Kristin Arreola RN  Outcome: Ongoing, Progressing  Intervention: Monitor Pain and Promote Comfort  Recent Flowsheet Documentation  Taken 2/1/2024 1234 by Kristin Arreola RN  Pain Management Interventions: see MAR  Taken 2/1/2024 0915 by Kristin Arreola RN  Pain Management Interventions: quiet environment facilitated  Intervention: Provide Person-Centered Care  Recent Flowsheet Documentation  Taken 2/1/2024 0915 by Kristin Arreola RN  Trust Relationship/Rapport:   care explained   choices provided   emotional support provided   empathic  listening provided   questions answered   questions encouraged   reassurance provided   thoughts/feelings acknowledged  Goal: Readiness for Transition of Care  2/1/2024 1326 by Kristin Arreola, RN  Outcome: Met  2/1/2024 1326 by Kristin Arreola, RN  Outcome: Ongoing, Progressing  Intervention: Mutually Develop Transition Plan  Recent Flowsheet Documentation  Taken 2/1/2024 1325 by Kristin Arreola, RN  Equipment Currently Used at Home: none  Transportation Anticipated: family or friend will provide  Patient/Family Anticipated Services at Transition: none  Patient/Family Anticipates Transition to: home with family

## 2024-02-01 NOTE — DISCHARGE SUMMARY
Date of Admission: 1/31/2024  9:23 PM      Date of Discharge:  2/1/2024    Admitting Service: [facility]    Admission Diagnosis: hyperemesis gravidarum  Discharge Diagnosis: hyperemesis gravidarum    Presenting Problem/History of Present Illness  Palpitations [R00.2]  Hyperemesis gravidarum [O21.0]  Short of breath on exertion [R06.02]       Hospital Course  Patient is a 33 y.o. female presented to ED yesterday with N/V, palpitations and SOA.  Hasn't been able to keep anything down in several days; getting worse.  She is 11w2d pregnant.  This is her 2nd child.  Difficult to walk up/down steps without getting out of breath.  Headache developed yesterday afternoon.  Denies fever/chills, diarrhea or general malaise.  Tried Zofran at home with no improvement.  EKG in ED negative.  D-dimer was mildly elevated which is expected in pregnancy; she declined a chest CT/VQ scan.  CBC, CMP, BNP, troponin were normal.  Given trial of Zofran in ED with no relief.  [facility]  After IVF, IV Zofran/Pepcid, she is ready to be discharged home.  Tolerated solid foods for lunch; voiding well.  Still has a slight headache which she can take OTC Tylenol for.  Denies vaginal bleeding or cramping.  She will be sent home with Pepcid and phenergan PRN.  Reasons to call MD where discussed.      Procedures Performed         Consults:   Consults       No orders found for last 30 day(s).            Pertinent Test Results: labs: CBC, CMP    Condition on Discharge:  Stable    Vital Signs  Temp:  [98.2 °F (36.8 °C)-99.5 °F (37.5 °C)] 98.5 °F (36.9 °C)  Heart Rate:  [67-76] 67  Resp:  [16-18] 18  BP: (113-146)/(62-98) 116/71    Physical Exam:   General Appearance: alert, appears stated age, and cooperative  Head: normocephalic, without obvious abnormality and atraumatic  Lungs: clear to auscultation, respirations regular, respirations even, and respirations unlabored  Heart: regular rhythm & normal rate, normal S1, S2, and no  murmur, no gallop, no rub  Abdomen: no masses, soft non-tender, and bowel sounds hypoactive  Pelvic: Exam deferred  Neurologic: Mental Status orientated to person, place, time and situation  Psych: normal    Discharge Disposition  Home or Self Care    Discharge Medications     Discharge Medications        New Medications        Instructions Start Date   famotidine 20 MG tablet  Commonly known as: PEPCID   20 mg, Oral, 2 Times Daily      promethazine 12.5 MG tablet  Commonly known as: PHENERGAN   12.5 mg, Oral, Every 6 Hours PRN             Continue These Medications        Instructions Start Date   albuterol sulfate  (90 Base) MCG/ACT inhaler  Commonly known as: PROVENTIL HFA;VENTOLIN HFA;PROAIR HFA   INHALE 2 PUFFS PO Q 6 H PRN      albuterol sulfate  (90 Base) MCG/ACT inhaler  Commonly known as: PROVENTIL HFA;VENTOLIN HFA;PROAIR HFA   2 puffs, Inhalation, Every 4 Hours PRN      aspirin 81 MG oral suspension   No dose, route, or frequency recorded.      docusate sodium 100 MG capsule  Commonly known as: Colace   100 mg, Oral, 2 Times Daily      fexofenadine 180 MG tablet  Commonly known as: ALLEGRA   No dose, route, or frequency recorded.      fish oil 1000 MG capsule capsule   Every 24 Hours      Magnesium 500 MG capsule   Every 24 Hours      ondansetron 4 MG tablet  Commonly known as: Zofran   4 mg, Oral, Every 8 Hours PRN      prenatal vitamin 27-0.8 27-0.8 MG tablet tablet   Oral, Daily      Unisom SleepTabs 25 MG tablet  Generic drug: doxylamine   25 mg, Oral, Nightly      vitamin B-12 1000 MCG tablet  Commonly known as: CYANOCOBALAMIN   See Admin Instructions      vitamin B-6 25 MG tablet  Commonly known as: PYRIDOXINE   25 mg, Oral, Nightly      Zinc 30 MG capsule   Oral             Stop These Medications      naproxen 500 MG tablet  Commonly known as: Naprosyn              Discharge Diet:     Activity at Discharge:   Activity Instructions       Activity as Tolerated              Follow-up  Appointments  Future Appointments   Date Time Provider Department Center   2/28/2024  3:30 PM [facility] Lag[facility]   2/28/2024  4:00 PM Nav Pastrana MD [facility]   3/26/2024  1:30 PM Nav Pastrana MD [facility]     Additional Instructions for the Follow-ups that You Need to Schedule       Call MD With Problems / Concerns   As directed      1) unable to keep any foods or liquid down  2) blood in your vomit  3) pain or cramps in your abdomen  4) feel dizzy when you stand up  5) any vaginal bleeding or other concerns    Our office number is [phone]    Order Comments: 1) unable to keep any foods or liquid down 2) blood in your vomit 3) pain or cramps in your abdomen 4) feel dizzy when you stand up 5) any vaginal bleeding or other concerns  Our office number is [phone]         Discharge Follow-up with Specialty: [facility]; 5 Days   As directed      Specialty: [facility]   Follow Up: 5 Days                Test Results Pending at Discharge       Rosalina Parker, SUNDEEP  02/01/24  13:17 EST    Time: Discharge 20 min

## 2024-02-04 LAB
CFDNA.FET/CFDNA.TOTAL SFR FETUS: NORMAL %
CITATION REF LAB TEST: NORMAL
FET 13+18+21+X+Y ANEUP PLAS.CFDNA: NEGATIVE
FET CHR 21 TS PLAS.CFDNA QL: NEGATIVE
FET SEX PLAS.CFDNA DOSAGE CFDNA: NORMAL
FET TS 13 RISK PLAS.CFDNA QL: NEGATIVE
FET TS 18 RISK WBC.DNA+CFDNA QL: NEGATIVE
GA EST FROM CONCEPTION DATE: NORMAL D
GESTATIONAL AGE > 9:: YES
LAB DIRECTOR NAME PROVIDER: NORMAL
LAB DIRECTOR NAME PROVIDER: NORMAL
LABORATORY COMMENT REPORT: NORMAL
LIMITATIONS OF THE TEST: NORMAL
NEGATIVE PREDICTIVE VALUE: NORMAL
NOTE: NORMAL
PERFORMANCE CHARACTERISTICS: NORMAL
POSITIVE PREDICTIVE VALUE: NORMAL
REF LAB TEST METHOD: NORMAL
TEST PERFORMANCE INFO SPEC: NORMAL

## 2024-02-05 ENCOUNTER — ROUTINE PRENATAL (OUTPATIENT)
Dept: OBSTETRICS AND GYNECOLOGY | Facility: CLINIC | Age: 34
End: 2024-02-05
Payer: COMMERCIAL

## 2024-02-05 VITALS — BODY MASS INDEX: 32.42 KG/M2 | DIASTOLIC BLOOD PRESSURE: 80 MMHG | SYSTOLIC BLOOD PRESSURE: 114 MMHG | WEIGHT: 194.8 LBS

## 2024-02-05 DIAGNOSIS — O21.9 NAUSEA AND VOMITING DURING PREGNANCY PRIOR TO 22 WEEKS GESTATION: Primary | ICD-10-CM

## 2024-02-05 PROCEDURE — 0502F SUBSEQUENT PRENATAL CARE: CPT | Performed by: NURSE PRACTITIONER

## 2024-02-05 RX ORDER — ONDANSETRON 4 MG/1
4 TABLET, FILM COATED ORAL EVERY 8 HOURS PRN
Qty: 30 TABLET | Refills: 2 | Status: SHIPPED | OUTPATIENT
Start: 2024-02-05

## 2024-02-05 NOTE — PROGRESS NOTES
OB follow up < 20 weeks    CC:  Here for follow up hyperemesis    Suha Raines is a 33 y.o.  11w6d being seen today for f/u hyperemesis last week.  Admitted to  for IVF.  Patient reports no complaints.  N/V have improved.  Taking +PNV and Zofran.  Here with significant other.      Review of Systems  Genitourinary: Neg for cramping, vaginal bleeding, SROM, or dysuria.       /80   Wt 88.4 kg (194 lb 12.8 oz)   LMP 10/30/2023 (Exact Date)   BMI 32.42 kg/m²     FHT: FCA+   Uterine Size:    Assessment    1) Pregnancy at 11w6d      2) N/V- emesis x 1 since she was discharged last Friday; ate food 1 hour ago with no complaints; taking Zofran 8 mg every 8 hours; didn't  phenergan or Pepcid that was given at discharge.  U/A neg today for ketones; denies H/A; repeat CMP for low Na/K         Plan    Continue prenatal vitamins   Reviewed this stage of pregnancy  Problem list updated   Keep scheduled follow up in 4 weeks for OBT, AFP, US for CL    Parts of this document have been copied or forwarded from her previous visits and have been reviewed, updated and edited as indicated.      Rosalina Parker, APRN     2024  11:37 EST

## 2024-02-06 LAB
ALBUMIN SERPL-MCNC: 4.4 G/DL (ref 3.9–4.9)
ALBUMIN/GLOB SERPL: 1.8 {RATIO} (ref 1.2–2.2)
ALP SERPL-CCNC: 48 IU/L (ref 44–121)
ALT SERPL-CCNC: 19 IU/L (ref 0–32)
AST SERPL-CCNC: 19 IU/L (ref 0–40)
BILIRUB SERPL-MCNC: 0.5 MG/DL (ref 0–1.2)
BUN SERPL-MCNC: 9 MG/DL (ref 6–20)
BUN/CREAT SERPL: 14 (ref 9–23)
CALCIUM SERPL-MCNC: 9.7 MG/DL (ref 8.7–10.2)
CHLORIDE SERPL-SCNC: 102 MMOL/L (ref 96–106)
CO2 SERPL-SCNC: 22 MMOL/L (ref 20–29)
CREAT SERPL-MCNC: 0.65 MG/DL (ref 0.57–1)
EGFRCR SERPLBLD CKD-EPI 2021: 120 ML/MIN/1.73
GLOBULIN SER CALC-MCNC: 2.4 G/DL (ref 1.5–4.5)
GLUCOSE SERPL-MCNC: 84 MG/DL (ref 70–99)
POTASSIUM SERPL-SCNC: 4.1 MMOL/L (ref 3.5–5.2)
PROT SERPL-MCNC: 6.8 G/DL (ref 6–8.5)
SODIUM SERPL-SCNC: 138 MMOL/L (ref 134–144)

## 2024-02-27 RX ORDER — DIPHENHYDRAMINE HYDROCHLORIDE 25 MG/1
25 CAPSULE ORAL NIGHTLY
Qty: 30 TABLET | Refills: 1 | Status: SHIPPED | OUTPATIENT
Start: 2024-02-27

## 2024-02-28 ENCOUNTER — ROUTINE PRENATAL (OUTPATIENT)
Dept: OBSTETRICS AND GYNECOLOGY | Facility: CLINIC | Age: 34
End: 2024-02-28
Payer: COMMERCIAL

## 2024-02-28 ENCOUNTER — LAB (OUTPATIENT)
Dept: LAB | Facility: HOSPITAL | Age: 34
End: 2024-02-28
Payer: COMMERCIAL

## 2024-02-28 VITALS — DIASTOLIC BLOOD PRESSURE: 72 MMHG | BODY MASS INDEX: 32.62 KG/M2 | SYSTOLIC BLOOD PRESSURE: 126 MMHG | WEIGHT: 196 LBS

## 2024-02-28 DIAGNOSIS — O09.899 SHORT INTERVAL BETWEEN PREGNANCIES AFFECTING PREGNANCY, ANTEPARTUM: ICD-10-CM

## 2024-02-28 DIAGNOSIS — J45.20 MILD INTERMITTENT ASTHMA WITHOUT COMPLICATION: ICD-10-CM

## 2024-02-28 DIAGNOSIS — Z87.59 HISTORY OF GESTATIONAL HYPERTENSION: Primary | ICD-10-CM

## 2024-02-28 DIAGNOSIS — O21.0 HYPEREMESIS GRAVIDARUM: ICD-10-CM

## 2024-02-28 DIAGNOSIS — Z87.59 HISTORY OF GESTATIONAL HYPERTENSION: ICD-10-CM

## 2024-02-28 PROBLEM — N91.2 AMENORRHEA: Status: RESOLVED | Noted: 2023-12-26 | Resolved: 2024-02-28

## 2024-02-28 PROBLEM — Z32.01 PREGNANCY CONFIRMED BY POSITIVE URINE TEST: Status: RESOLVED | Noted: 2023-12-26 | Resolved: 2024-02-28

## 2024-02-28 PROBLEM — Z34.90 EARLY STAGE OF PREGNANCY: Status: RESOLVED | Noted: 2024-01-03 | Resolved: 2024-02-28

## 2024-02-28 PROCEDURE — 36415 COLL VENOUS BLD VENIPUNCTURE: CPT

## 2024-02-28 PROCEDURE — 0502F SUBSEQUENT PRENATAL CARE: CPT | Performed by: OBSTETRICS & GYNECOLOGY

## 2024-02-28 PROCEDURE — 82105 ALPHA-FETOPROTEIN SERUM: CPT

## 2024-02-28 NOTE — PROGRESS NOTES
Pt here for ob check and u/s for CL for hx short interval.  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 colpo: bx, ECC neg; pt needs LEEP.    White coat syndrome with diagnosis of hypertension    History of gestational hypertension- currently pregnant    Constipation    Short interval between pregnancies affecting pregnancy, antepartum-  2023    Hyperemesis gravidarum     U/s: Us today for CL for hx short interval: VIUP, CL 4.46cm    Pt still having nausea.  No new meds requested.    Pt desires AFP and will have it drawn at hospital.    All questions answered and this stage of pregnancy reviewed.

## 2024-03-01 RX ORDER — FAMOTIDINE 20 MG/1
20 TABLET, FILM COATED ORAL 2 TIMES DAILY
Qty: 60 TABLET | Refills: 0 | Status: SHIPPED | OUTPATIENT
Start: 2024-03-01

## 2024-03-04 LAB
AFP INTERP SERPL-IMP: NORMAL
AFP INTERP SERPL-IMP: NORMAL
AFP MOM SERPL: 1.5
AFP SERPL-MCNC: 38 NG/ML
AGE AT DELIVERY: 32.9 YR
GA METHOD: NORMAL
GA: 15.1 WEEKS
IDDM PATIENT QL: NO
LABORATORY COMMENT REPORT: NORMAL
MULTIPLE PREGNANCY: NO
NEURAL TUBE DEFECT RISK FETUS: 2734 %
RESULT: NORMAL

## 2024-03-13 DIAGNOSIS — F40.243 ANXIETY WITH FLYING: Primary | ICD-10-CM

## 2024-03-13 RX ORDER — HYDROXYZINE HYDROCHLORIDE 25 MG/1
25 TABLET, FILM COATED ORAL EVERY 6 HOURS PRN
Qty: 30 TABLET | Refills: 0 | Status: SHIPPED | OUTPATIENT
Start: 2024-03-13

## 2024-03-27 ENCOUNTER — ROUTINE PRENATAL (OUTPATIENT)
Dept: OBSTETRICS AND GYNECOLOGY | Facility: CLINIC | Age: 34
End: 2024-03-27
Payer: COMMERCIAL

## 2024-03-27 VITALS — BODY MASS INDEX: 33.12 KG/M2 | WEIGHT: 199 LBS | DIASTOLIC BLOOD PRESSURE: 74 MMHG | SYSTOLIC BLOOD PRESSURE: 124 MMHG

## 2024-03-27 DIAGNOSIS — Z34.92 PRENATAL CARE IN SECOND TRIMESTER: Primary | ICD-10-CM

## 2024-03-27 LAB
EXTERNAL CYSTIC FIBROSIS: NORMAL
EXTERNAL NIPT: NORMAL

## 2024-03-27 PROCEDURE — 0502F SUBSEQUENT PRENATAL CARE: CPT | Performed by: NURSE PRACTITIONER

## 2024-03-27 NOTE — PROGRESS NOTES
OB follow up < 20 weeks    CC:  Ob visit    Suha Raines is a 33 y.o.  19w1d being seen today for OB visit. She is starting to feel fetal movement. She is accompanied by her partner today. She has an appt for anatomy US next week.     Review of Systems  Genitourinary: Neg for cramping, vaginal bleeding, SROM, or dysuria.       /74   Wt 90.3 kg (199 lb)   LMP 10/30/2023 (Exact Date)   BMI 33.12 kg/m²     FHT: 130s    Uterine Size: S=D    Assessment    1) Pregnancy at 19w1d      2) AFP neg. T21 low risk, male. CF/SMA/FX neg .     3)  Declined flu vaccine. S/P covid vaccine.     4) H/O 8.13# infant- Watch growth 3rd trimester.      5) Short interval between pregnancies-  . CL 4.46cm on 24.     6) hx of GHTN- ? Related to white coat vs CHTN; Has not done a 24 hr urine. Enc pt to complete and bring to next appt.      7) N/V- Has zofran for PRN use. Takes B6 and Unisom at bedtime.      8) anxiety- Reports going well. She has hydroxyzine for anxiety PRN.      9) asthma- well controlled; only needs inhaler on occasion; Has albuterol inhaler for PRN use.      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 colpo: bx, ECC neg; pt needs LEEP.       Plan    Continue prenatal vitamins   Reviewed this stage of pregnancy  Problem list updated   Keep scheduled appt for anatomy US       Noreen Taylor, APRN  3/27/2024  16:35 EDT

## 2024-04-03 ENCOUNTER — ROUTINE PRENATAL (OUTPATIENT)
Dept: OBSTETRICS AND GYNECOLOGY | Facility: CLINIC | Age: 34
End: 2024-04-03
Payer: COMMERCIAL

## 2024-04-03 VITALS — BODY MASS INDEX: 32.95 KG/M2 | DIASTOLIC BLOOD PRESSURE: 70 MMHG | WEIGHT: 198 LBS | SYSTOLIC BLOOD PRESSURE: 128 MMHG

## 2024-04-03 DIAGNOSIS — Z87.59 HISTORY OF GESTATIONAL HYPERTENSION: ICD-10-CM

## 2024-04-03 DIAGNOSIS — Z3A.20 20 WEEKS GESTATION OF PREGNANCY: Primary | ICD-10-CM

## 2024-04-08 RX ORDER — FAMOTIDINE 20 MG/1
20 TABLET, FILM COATED ORAL 2 TIMES DAILY
Qty: 60 TABLET | Refills: 6 | Status: SHIPPED | OUTPATIENT
Start: 2024-04-08

## 2024-04-29 ENCOUNTER — TELEPHONE (OUTPATIENT)
Dept: OBSTETRICS AND GYNECOLOGY | Facility: CLINIC | Age: 34
End: 2024-04-29
Payer: COMMERCIAL

## 2024-04-29 NOTE — TELEPHONE ENCOUNTER
Hub staff attempted to follow warm transfer process and was unsuccessful     Caller: Suha Raines    Relationship to patient: Self    Best call back number: [phone] / LVM    Patient is needing: PT IS ABOUT 24 WEEKS PREG - PT WAS OUTSIDE YESTERDAY AND WAS ATE UP BY [facility] - PT HAS BITES ALL OVER BOTH ARMS AND HAS A RASH ON HER RT ARM - PT IS A LITTLE CONCERNED AND WANTS TO KNOW WHAT SHE CAN USE OTC OR IF SOMETHING CAN BE CALLED INTO HER PHARMACY ([facility] CONFIRMED)     PLEASE CALL THE PT TO DISCUSS    THANK YOU!

## 2024-05-01 ENCOUNTER — ROUTINE PRENATAL (OUTPATIENT)
Dept: OBSTETRICS AND GYNECOLOGY | Facility: CLINIC | Age: 34
End: 2024-05-01
Payer: COMMERCIAL

## 2024-05-01 VITALS — SYSTOLIC BLOOD PRESSURE: 128 MMHG | DIASTOLIC BLOOD PRESSURE: 76 MMHG | BODY MASS INDEX: 33.12 KG/M2 | WEIGHT: 199 LBS

## 2024-05-01 DIAGNOSIS — B00.9 HSV-1 INFECTION: ICD-10-CM

## 2024-05-01 DIAGNOSIS — Z36.9 ENCOUNTER FOR ANTENATAL SCREENING, UNSPECIFIED: Primary | ICD-10-CM

## 2024-05-01 DIAGNOSIS — J45.20 MILD INTERMITTENT ASTHMA WITHOUT COMPLICATION: ICD-10-CM

## 2024-05-01 DIAGNOSIS — O09.899 SHORT INTERVAL BETWEEN PREGNANCIES AFFECTING PREGNANCY, ANTEPARTUM: ICD-10-CM

## 2024-05-01 NOTE — PROGRESS NOTES
S:  cc: Pt here for ob office visit .  hpi: Suha Raines is a 33 y.o.  24w1d being seen today for her obstetrical visit.   Patient reports no complaints .   Fetal movement: normal. .      Social History     Social History Narrative    Not on file      SMOKER? No      O:  /76   Wt 90.3 kg (199 lb)   LMP 10/30/2023 (Exact Date)   BMI 33.12 kg/m²     Prenatal Assessment  Fetal Heart Rate: present  Fundal Height (cm): 25 cm  Movement: Present  Prenatal Vitals  BP: 128/76  Weight: 90.3 kg (199 lb)  Vaginal Drainage  Draining Fluid: No  Edema  LLE Edema: None  RLE Edema: None  Facial Edema: None    Brief Urine Lab Results  (Last result in the past 365 days)        Color   Clarity   Blood   Leuk Est   Nitrite   Protein   CREAT   Urine HCG        24 1132 Yellow   Clear   Negative   Negative   Negative   Negative                   A:    DIAGNOSES:  33 y.o.  24w1d  Diagnoses and all orders for this visit:    1. Encounter for  screening, unspecified (Primary)  -     POC Urinalysis Dipstick    2. Mild intermittent asthma without complication    3. HSV-1 infection- no genital lesions    4. Short interval between pregnancies affecting pregnancy, antepartum-  2023      NEW PROBLEMS? none    P:  Return in about 4 weeks (around 2024) for ob tummy, GTT/HH, Tdap.        Nav Pastrana MD  17:33 EDT   24

## 2024-05-03 RX ORDER — FLUCONAZOLE 150 MG/1
150 TABLET ORAL ONCE
Qty: 1 TABLET | Refills: 0 | Status: SHIPPED | OUTPATIENT
Start: 2024-05-03 | End: 2024-05-03

## 2024-05-06 ENCOUNTER — HOSPITAL ENCOUNTER (OUTPATIENT)
Facility: HOSPITAL | Age: 34
Discharge: HOME | End: 2024-05-06
Attending: STUDENT IN AN ORGANIZED HEALTH CARE EDUCATION/TRAINING PROGRAM | Admitting: STUDENT IN AN ORGANIZED HEALTH CARE EDUCATION/TRAINING PROGRAM
Payer: COMMERCIAL

## 2024-05-06 VITALS — HEART RATE: 71 BPM | RESPIRATION RATE: 18 BRPM | SYSTOLIC BLOOD PRESSURE: 128 MMHG | DIASTOLIC BLOOD PRESSURE: 64 MMHG

## 2024-05-06 PROBLEM — Z34.90 PREGNANCY: Status: ACTIVE | Noted: 2024-05-06

## 2024-05-06 LAB
AMPHET+METHAMPHET UR QL: NEGATIVE
AMPHETAMINES UR QL: NEGATIVE
BARBITURATES UR QL SCN: NEGATIVE
BENZODIAZ UR QL SCN: NEGATIVE
BUPRENORPHINE SERPL-MCNC: NEGATIVE NG/ML
CANNABINOIDS SERPL QL: NEGATIVE
COCAINE UR QL: NEGATIVE
METHADONE UR QL SCN: NEGATIVE
OPIATES UR QL: NEGATIVE
OXYCODONE UR QL SCN: NEGATIVE
PCP UR QL SCN: NEGATIVE
TRICYCLICS UR QL SCN: NEGATIVE

## 2024-05-06 PROCEDURE — G0463 HOSPITAL OUTPT CLINIC VISIT: HCPCS

## 2024-05-06 PROCEDURE — 80306 DRUG TEST PRSMV INSTRMNT: CPT | Performed by: STUDENT IN AN ORGANIZED HEALTH CARE EDUCATION/TRAINING PROGRAM

## 2024-05-15 NOTE — PROGRESS NOTES
OB follow up     Suha Raines is a 33 y.o.  20w1d being seen today for her obstetrical visit.  Patient reports no bleeding, no contractions, and no leaking. Fetal movement: normal.  Here for anatomical survey.  Prenatal care complicated by history of gestational hypertension of her previous pregnancy.    Review of Systems  No bleeding, No cramping/contractions     /70   Wt 89.8 kg (198 lb)   LMP 10/30/2023 (Exact Date)   BMI 32.95 kg/m²     FHT: present BPM   Uterine Size: 20 cm   Ultrasound shows live viable dixon male fetus in the breech position.  Cervical length is 3.99 cm.  Heart rate is 147 bpm.  There is an anterior placenta, MELISSA is normal.    Assessment/Plan:    1) 33 y.o.  -pregnancy at 20w1d    2)   Encounter Diagnoses   Name Primary?    20 weeks gestation of pregnancy Yes    History of gestational hypertension- currently pregnant    Normal anatomical survey.  Findings discussed with the patient.  Routine care.    3) Reviewed this stage of pregnancy  4) Problem list updated     Return in about 4 weeks (around 2024) for OB Demetriusmy.    I spent 20 minutes caring for Suha on this date of service. This time includes time spent by me in the following activities: preparing for the visit, reviewing tests, obtaining and/or reviewing a separately obtained history, counseling and educating the patient/family/caregiver, documenting information in the medical record, and independently interpreting results and communicating that information with the patient/family/caregiver.      Tayo Carrasco MD    5/15/2024  15:00 EDT

## 2024-05-20 DIAGNOSIS — O21.9 NAUSEA AND VOMITING DURING PREGNANCY PRIOR TO 22 WEEKS GESTATION: ICD-10-CM

## 2024-05-21 RX ORDER — ONDANSETRON 4 MG/1
4 TABLET, FILM COATED ORAL EVERY 8 HOURS PRN
Qty: 30 TABLET | Refills: 2 | Status: SHIPPED | OUTPATIENT
Start: 2024-05-21

## 2024-05-29 ENCOUNTER — ROUTINE PRENATAL (OUTPATIENT)
Dept: OBSTETRICS AND GYNECOLOGY | Facility: CLINIC | Age: 34
End: 2024-05-29
Payer: COMMERCIAL

## 2024-05-29 VITALS — DIASTOLIC BLOOD PRESSURE: 76 MMHG | WEIGHT: 207 LBS | BODY MASS INDEX: 34.45 KG/M2 | SYSTOLIC BLOOD PRESSURE: 134 MMHG

## 2024-05-29 DIAGNOSIS — Z87.59 HISTORY OF GESTATIONAL HYPERTENSION: ICD-10-CM

## 2024-05-29 DIAGNOSIS — J45.20 MILD INTERMITTENT ASTHMA WITHOUT COMPLICATION: ICD-10-CM

## 2024-05-29 DIAGNOSIS — O36.60X0 EXCESSIVE FETAL GROWTH AFFECTING MANAGEMENT OF PREGNANCY, ANTEPARTUM, SINGLE OR UNSPECIFIED FETUS: ICD-10-CM

## 2024-05-29 DIAGNOSIS — O09.899 SHORT INTERVAL BETWEEN PREGNANCIES AFFECTING PREGNANCY, ANTEPARTUM: ICD-10-CM

## 2024-05-29 DIAGNOSIS — Z36.9 ENCOUNTER FOR ANTENATAL SCREENING, UNSPECIFIED: Primary | ICD-10-CM

## 2024-05-29 DIAGNOSIS — Z3A.28 28 WEEKS GESTATION OF PREGNANCY: ICD-10-CM

## 2024-05-29 DIAGNOSIS — I10 WHITE COAT SYNDROME WITH DIAGNOSIS OF HYPERTENSION: ICD-10-CM

## 2024-05-29 PROBLEM — Z34.92 PRENATAL CARE IN SECOND TRIMESTER: Status: RESOLVED | Noted: 2024-03-27 | Resolved: 2024-05-29

## 2024-05-29 PROBLEM — O12.12 PROTEINURIA AFFECTING PREGNANCY IN SECOND TRIMESTER: Status: ACTIVE | Noted: 2024-05-29

## 2024-05-29 LAB
BILIRUB BLD-MCNC: NEGATIVE MG/DL
CLARITY, POC: CLEAR
COLOR UR: YELLOW
GLUCOSE UR STRIP-MCNC: NEGATIVE MG/DL
KETONES UR QL: NEGATIVE
LEUKOCYTE EST, POC: NEGATIVE
NITRITE UR-MCNC: NEGATIVE MG/ML
PH UR: 7 [PH] (ref 5–8)
PROT UR STRIP-MCNC: ABNORMAL MG/DL
RBC # UR STRIP: NEGATIVE /UL
SP GR UR: 1 (ref 1–1.03)
UROBILINOGEN UR QL: NORMAL

## 2024-05-29 NOTE — PROGRESS NOTES
S:  cc: Pt here for ob office visit and u/s for CL for hx short interval, GTT/HH & Tdap.  hpi: Suha Raines is a 33 y.o.  28w1d being seen today for her obstetrical visit.   Patient reports fatigue .   Fetal movement: normal. .      Social History     Social History Narrative    Not on file      SMOKER? No      O:  /76   Wt 93.9 kg (207 lb)   LMP 10/30/2023 (Exact Date)   BMI 34.45 kg/m²     Prenatal Assessment  Fetal Heart Rate: present  Fundal Height (cm): 28 cm  Movement: Present  Prenatal Vitals  BP: 134/76  Weight: 93.9 kg (207 lb)  Urine Glucose/Protein  Urine Glucose Read-only: Negative  Urine Protein Read-only: (!) 1+  Vaginal Drainage  Draining Fluid: No  Edema  LLE Edema: None  RLE Edema: None  Facial Edema: None    Brief Urine Lab Results  (Last result in the past 365 days)        Color   Clarity   Blood   Leuk Est   Nitrite   Protein   CREAT   Urine HCG        24 0934 Yellow   Clear   Negative   Negative   Negative   1+                 Proteinuria noted.     A:    DIAGNOSES:  33 y.o.  28w1d  Diagnoses and all orders for this visit:    1. Encounter for  screening, unspecified (Primary)  -     POC Urinalysis Dipstick    2. History of gestational hypertension- currently pregnant    3. White coat syndrome with diagnosis of hypertension    4. Short interval between pregnancies affecting pregnancy, antepartum-  2023    5. 28 weeks gestation of pregnancy    6. Mild intermittent asthma without complication    7. Excessive fetal growth affecting management of pregnancy, antepartum, single or unspecified fetus      NEW PROBLEMS? Pt declines Tdap after counseling.  GTT/HH done today.      P:  Return in about 2 weeks (around 2024) for ob tummy.    Pt instructed to call for results of any testing done today and that failure to call if she has not heard from us could result in inadequate care and treament.  Pt verbalized her understanding.       Nav Mccarty  MD Victoriano  09:50 EDT   05/29/24

## 2024-06-12 ENCOUNTER — ROUTINE PRENATAL (OUTPATIENT)
Dept: OBSTETRICS AND GYNECOLOGY | Facility: CLINIC | Age: 34
End: 2024-06-12
Payer: COMMERCIAL

## 2024-06-12 VITALS — SYSTOLIC BLOOD PRESSURE: 128 MMHG | BODY MASS INDEX: 34.95 KG/M2 | WEIGHT: 210 LBS | DIASTOLIC BLOOD PRESSURE: 76 MMHG

## 2024-06-12 DIAGNOSIS — I10 WHITE COAT SYNDROME WITH DIAGNOSIS OF HYPERTENSION: ICD-10-CM

## 2024-06-12 DIAGNOSIS — J45.20 MILD INTERMITTENT ASTHMA WITHOUT COMPLICATION: ICD-10-CM

## 2024-06-12 DIAGNOSIS — Z3A.30 30 WEEKS GESTATION OF PREGNANCY: ICD-10-CM

## 2024-06-12 DIAGNOSIS — O09.899 SHORT INTERVAL BETWEEN PREGNANCIES AFFECTING PREGNANCY, ANTEPARTUM: ICD-10-CM

## 2024-06-12 DIAGNOSIS — O36.60X0 EXCESSIVE FETAL GROWTH AFFECTING MANAGEMENT OF PREGNANCY, ANTEPARTUM, SINGLE OR UNSPECIFIED FETUS: ICD-10-CM

## 2024-06-12 DIAGNOSIS — Z36.9 ENCOUNTER FOR ANTENATAL SCREENING, UNSPECIFIED: Primary | ICD-10-CM

## 2024-06-12 DIAGNOSIS — Z87.59 HISTORY OF GESTATIONAL HYPERTENSION: ICD-10-CM

## 2024-06-12 LAB
BILIRUB BLD-MCNC: NEGATIVE MG/DL
CLARITY, POC: CLEAR
COLOR UR: YELLOW
GLUCOSE UR STRIP-MCNC: NEGATIVE MG/DL
KETONES UR QL: NEGATIVE
LEUKOCYTE EST, POC: NEGATIVE
NITRITE UR-MCNC: NEGATIVE MG/ML
PH UR: 5 [PH] (ref 5–8)
PROT UR STRIP-MCNC: ABNORMAL MG/DL
RBC # UR STRIP: NEGATIVE /UL
SP GR UR: 1 (ref 1–1.03)
UROBILINOGEN UR QL: NORMAL

## 2024-06-12 NOTE — PROGRESS NOTES
S:  cc: Pt here for ob office visit .  hpi: Suha Raines is a 33 y.o.  30w1d being seen today for her obstetrical visit.   Patient reports  minor discomforts of pregnancy    .   Fetal movement: normal. .      Social History     Social History Narrative    Not on file      SMOKER? No      O:  /76   Wt 95.3 kg (210 lb)   LMP 10/30/2023 (Exact Date)   BMI 34.95 kg/m²     Prenatal Assessment  Fetal Heart Rate: present  Fundal Height (cm): 30 cm  Movement: Present  Prenatal Vitals  BP: 128/76  Weight: 95.3 kg (210 lb)  Urine Glucose/Protein  Urine Glucose Read-only: Negative  Urine Protein Read-only: (!) Trace  Vaginal Drainage  Draining Fluid: No  Edema  LLE Edema: Trace  RLE Edema: Trace  Facial Edema: None    Brief Urine Lab Results  (Last result in the past 365 days)        Color   Clarity   Blood   Leuk Est   Nitrite   Protein   CREAT   Urine HCG        24 1613 Yellow   Clear   Negative   Negative   Negative   Trace                   A:    DIAGNOSES:  33 y.o.  30w1d  Diagnoses and all orders for this visit:    1. Encounter for  screening, unspecified (Primary)  -     POC Urinalysis Dipstick    2. White coat syndrome with diagnosis of hypertension    3. 30 weeks gestation of pregnancy    4. Short interval between pregnancies affecting pregnancy, antepartum-  2023    5. Mild intermittent asthma without complication    6. History of gestational hypertension- currently pregnant    7. Excessive fetal growth affecting management of pregnancy, antepartum, single or unspecified fetus        P:  Return in about 2 weeks (around 2024) for ob tummy.    Pt instructed to call for results of any testing done today and that failure to call if she has not heard from us could result in inadequate care and treament.  Pt verbalized her understanding.       Nav Pastrana MD  16:35 EDT   24

## 2024-06-26 ENCOUNTER — ROUTINE PRENATAL (OUTPATIENT)
Dept: OBSTETRICS AND GYNECOLOGY | Facility: CLINIC | Age: 34
End: 2024-06-26
Payer: COMMERCIAL

## 2024-06-26 VITALS — WEIGHT: 213 LBS | SYSTOLIC BLOOD PRESSURE: 118 MMHG | DIASTOLIC BLOOD PRESSURE: 72 MMHG | BODY MASS INDEX: 35.45 KG/M2

## 2024-06-26 DIAGNOSIS — Z3A.32 32 WEEKS GESTATION OF PREGNANCY: Primary | ICD-10-CM

## 2024-06-26 DIAGNOSIS — Z36.9 ENCOUNTER FOR ANTENATAL SCREENING, UNSPECIFIED: ICD-10-CM

## 2024-06-26 DIAGNOSIS — Z87.59 HISTORY OF GESTATIONAL HYPERTENSION: ICD-10-CM

## 2024-06-26 PROCEDURE — 0502F SUBSEQUENT PRENATAL CARE: CPT | Performed by: OBSTETRICS & GYNECOLOGY

## 2024-06-26 RX ORDER — FLUCONAZOLE 150 MG/1
150 TABLET ORAL ONCE
Qty: 1 TABLET | Refills: 0 | Status: SHIPPED | OUTPATIENT
Start: 2024-06-26 | End: 2024-06-26

## 2024-06-26 NOTE — PROGRESS NOTES
OB follow up     Suha Raines is a 33 y.o.  32w1d being seen today for her obstetrical visit.  Patient reports no bleeding, no contractions, and no leaking. Fetal movement: normal.  Here for growth scan.    Review of Systems  No bleeding, No cramping/contractions     /72   Wt 96.6 kg (213 lb)   LMP 10/30/2023 (Exact Date)   BMI 35.45 kg/m²     FHT: present BPM   Uterine Size:     Live viable dixon fetus in the vertex position.  Growth is in the 69th percentile.  MELISSA normal at 17.13 cm.  Cervical length is 3.53 cm with no funneling.    Assessment/Plan:    1) 33 y.o.  -pregnancy at 32w1d    2)   Encounter Diagnoses   Name Primary?    32 weeks gestation of pregnancy Yes    Encounter for  screening, unspecified     History of gestational hypertension- currently pregnant    Normal growth.    3) Reviewed this stage of pregnancy  4) Problem list updated     Return in about 2 weeks (around 7/10/2024) for OB Tummy.    I spent 20 minutes caring for Suha on this date of service. This time includes time spent by me in the following activities: preparing for the visit, reviewing tests, performing a medically appropriate examination and/or evaluation, counseling and educating the patient/family/caregiver, documenting information in the medical record, and independently interpreting results and communicating that information with the patient/family/caregiver.      Tayo Carrasco MD    2024  17:02 EDT

## 2024-07-02 DIAGNOSIS — O21.9 NAUSEA AND VOMITING DURING PREGNANCY PRIOR TO 22 WEEKS GESTATION: ICD-10-CM

## 2024-07-02 RX ORDER — ONDANSETRON 4 MG/1
4 TABLET, FILM COATED ORAL EVERY 8 HOURS PRN
Qty: 30 TABLET | Refills: 2 | Status: SHIPPED | OUTPATIENT
Start: 2024-07-02

## 2024-07-10 ENCOUNTER — ROUTINE PRENATAL (OUTPATIENT)
Dept: OBSTETRICS AND GYNECOLOGY | Facility: CLINIC | Age: 34
End: 2024-07-10
Payer: COMMERCIAL

## 2024-07-10 VITALS — DIASTOLIC BLOOD PRESSURE: 88 MMHG | WEIGHT: 218 LBS | BODY MASS INDEX: 36.28 KG/M2 | SYSTOLIC BLOOD PRESSURE: 124 MMHG

## 2024-07-10 DIAGNOSIS — O09.899 SHORT INTERVAL BETWEEN PREGNANCIES AFFECTING PREGNANCY, ANTEPARTUM: ICD-10-CM

## 2024-07-10 DIAGNOSIS — Z87.59 HISTORY OF GESTATIONAL HYPERTENSION: ICD-10-CM

## 2024-07-10 DIAGNOSIS — R87.613 HGSIL (HIGH GRADE SQUAMOUS INTRAEPITHELIAL LESION) ON PAP SMEAR OF CERVIX: ICD-10-CM

## 2024-07-10 DIAGNOSIS — Z36.9 ENCOUNTER FOR ANTENATAL SCREENING, UNSPECIFIED: Primary | ICD-10-CM

## 2024-07-10 DIAGNOSIS — O12.12 PROTEINURIA AFFECTING PREGNANCY IN SECOND TRIMESTER: ICD-10-CM

## 2024-07-10 DIAGNOSIS — I10 WHITE COAT SYNDROME WITH DIAGNOSIS OF HYPERTENSION: ICD-10-CM

## 2024-07-10 PROBLEM — O21.0 HYPEREMESIS GRAVIDARUM: Status: RESOLVED | Noted: 2024-01-31 | Resolved: 2024-07-10

## 2024-07-10 RX ORDER — FAMOTIDINE 20 MG/1
20 TABLET, FILM COATED ORAL 2 TIMES DAILY
Qty: 60 TABLET | Refills: 6 | Status: SHIPPED | OUTPATIENT
Start: 2024-07-10

## 2024-07-10 NOTE — PROGRESS NOTES
S:  CC: Pt here for ob office visit .  HPI: Suha Raines is a 33 y.o.  34w1d being seen today for her obstetrical visit.   Patient reports no complaints .   Fetal movement: normal. .      Social History     Social History Narrative    Not on file      SMOKER? No      O:  /88   Wt 98.9 kg (218 lb)   LMP 10/30/2023 (Exact Date)   BMI 36.28 kg/m²     Prenatal Assessment  Fetal Heart Rate: present  Fundal Height (cm): 35 cm  Movement: Present  Prenatal Vitals  BP: 124/88  Weight: 98.9 kg (218 lb)  Urine Glucose/Protein  Urine Glucose Read-only: Negative  Urine Protein Read-only: Negative  Vaginal Drainage  Draining Fluid: No  Edema  LLE Edema: None  RLE Edema: None  Facial Edema: None    Brief Urine Lab Results  (Last result in the past 365 days)        Color   Clarity   Blood   Leuk Est   Nitrite   Protein   CREAT   Urine HCG        07/10/24 1631 Yellow   Clear   Negative   Negative   Negative   Negative                       A/P:    Assessment & Plan  Encounter for  screening, unspecified    White coat syndrome with diagnosis of hypertension    hx HGSIL, current pap w/ LGSIL. colpo: bx, ECC neg; pt needs LEEP.    History of gestational hypertension- currently pregnant    Proteinuria affecting pregnancy in second trimester: mild    Short interval between pregnancies affecting pregnancy, antepartum-  2023      Orders Placed This Encounter   Procedures    POC Urinalysis Dipstick     New Medications Ordered This Visit   Medications    famotidine (PEPCID) 20 MG tablet     Sig: Take 1 tablet by mouth 2 (Two) Times a Day.     Dispense:  60 tablet     Refill:  6         Return in about 2 weeks (around 2024) for ob int, GBS, labor warnings.      Pt instructed to call for results of any testing done today and that failure to call if she has not heard from us could result in inadequate care and treament.  Pt verbalized her understanding.        Nav Pastrana MD  16:53 EDT    07/10/24

## 2024-07-25 ENCOUNTER — ROUTINE PRENATAL (OUTPATIENT)
Dept: OBSTETRICS AND GYNECOLOGY | Facility: CLINIC | Age: 34
End: 2024-07-25
Payer: COMMERCIAL

## 2024-07-25 VITALS — WEIGHT: 219 LBS | SYSTOLIC BLOOD PRESSURE: 128 MMHG | BODY MASS INDEX: 36.44 KG/M2 | DIASTOLIC BLOOD PRESSURE: 84 MMHG

## 2024-07-25 DIAGNOSIS — Z87.59 HISTORY OF GESTATIONAL HYPERTENSION: ICD-10-CM

## 2024-07-25 DIAGNOSIS — B00.9 HSV-1 INFECTION: ICD-10-CM

## 2024-07-25 DIAGNOSIS — D50.8 OTHER IRON DEFICIENCY ANEMIA: ICD-10-CM

## 2024-07-25 DIAGNOSIS — O99.019 MATERNAL ANEMIA IN PREGNANCY, ANTEPARTUM: ICD-10-CM

## 2024-07-25 DIAGNOSIS — O09.899 SHORT INTERVAL BETWEEN PREGNANCIES AFFECTING PREGNANCY, ANTEPARTUM: ICD-10-CM

## 2024-07-25 DIAGNOSIS — R87.613 HGSIL (HIGH GRADE SQUAMOUS INTRAEPITHELIAL LESION) ON PAP SMEAR OF CERVIX: ICD-10-CM

## 2024-07-25 DIAGNOSIS — O36.60X0 EXCESSIVE FETAL GROWTH AFFECTING MANAGEMENT OF PREGNANCY, ANTEPARTUM, SINGLE OR UNSPECIFIED FETUS: ICD-10-CM

## 2024-07-25 DIAGNOSIS — J45.20 MILD INTERMITTENT ASTHMA WITHOUT COMPLICATION: ICD-10-CM

## 2024-07-25 DIAGNOSIS — Z34.90 PREGNANCY, UNSPECIFIED GESTATIONAL AGE: ICD-10-CM

## 2024-07-25 DIAGNOSIS — Z36.9 ENCOUNTER FOR ANTENATAL SCREENING, UNSPECIFIED: Primary | ICD-10-CM

## 2024-07-25 RX ORDER — FERROUS GLUCONATE 324(38)MG
324 TABLET ORAL
Qty: 30 TABLET | Refills: 6 | Status: SHIPPED | OUTPATIENT
Start: 2024-07-25

## 2024-07-25 NOTE — PROGRESS NOTES
OB follow up     Suha Raines is a 33 y.o.  36w2d being seen today for her obstetrical visit.  Patient reports  generalized discomfort. She is not taking iron. She does not need inhaler often. . Fetal movement: normal. This is the first time I am seeing this patient in this pregnancy and all of her problems are new to me, the examiner.       Review of Systems  No bleeding, No cramping/contractions     /84   Wt 99.3 kg (219 lb)   LMP 10/30/2023 (Exact Date)   BMI 36.44 kg/m²     FHT: 145 BPM   Uterine Size: 37  cm     305, 1 cm, - 3 station.     Assessment/Plan:    1) 33 y.o.  -pregnancy at 36w2d- GBS collected.     2) AFP neg. T21 low risk, male. CF/SMA/FX neg .      3) Anemia- HgB 11.9 ( 2024)- check CBC. Start daily iron    4) H/O 8.13# infant- last growth US on 2024= 70%. Watch growth 3rd trimester, has growth US scheduled for 38 weeks. Consider IOL at 39 weeks.      5) Short interval between pregnancies-  .      6) hx of GHTN- ? Related to white coat vs CHTN; Pr;CR= 165 (2024)      7) N/V- Has zofran for PRN use. Takes B6 and Unisom at bedtime.      8) Anxiety- Reports going well. She has hydroxyzine for anxiety PRN.      9) Asthma- well controlled; only needs inhaler on occasion; Has albuterol inhaler for PRN use.      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 colpo: bx, ECC neg in 2023. ; pt needs repeat pap pp and offer Gardasil.     12) Reviewed this stage of pregnancy. Plans breast, epidural, circ, Dr Garth green and undecided about contraception    13) Problem list updated     14) RTO 1 week OB int, check growth at 38 weeks.       Sravanthi Obando MD    2024  15:51 EDT

## 2024-07-26 ENCOUNTER — TELEPHONE (OUTPATIENT)
Dept: OBSTETRICS AND GYNECOLOGY | Facility: CLINIC | Age: 34
End: 2024-07-26

## 2024-07-26 LAB
ERYTHROCYTE [DISTWIDTH] IN BLOOD BY AUTOMATED COUNT: 12.3 % (ref 11.7–15.4)
HCT VFR BLD AUTO: 33 % (ref 34–46.6)
HGB BLD-MCNC: 11.5 G/DL (ref 11.1–15.9)
MCH RBC QN AUTO: 33.6 PG (ref 26.6–33)
MCHC RBC AUTO-ENTMCNC: 34.8 G/DL (ref 31.5–35.7)
MCV RBC AUTO: 97 FL (ref 79–97)
PLATELET # BLD AUTO: 223 X10E3/UL (ref 150–450)
RBC # BLD AUTO: 3.42 X10E6/UL (ref 3.77–5.28)
WBC # BLD AUTO: 7.6 X10E3/UL (ref 3.4–10.8)

## 2024-07-26 NOTE — TELEPHONE ENCOUNTER
Hub staff attempted to follow warm transfer process and was unsuccessful     Caller: Suha Raines    Relationship to patient: Self    Best call back number: [phone]    Patient is needing: PT STATED THAT A PRESCRIPTION WAS CALLED IN AS A PRECAUTION FROM HER APPT YESTERDAY.     BASED ON HER LAB WORK RESULTS FROM YESTERDAY, PT WOULD LIKE TO KNOW IF SHE NEEDS TO BE TAKING THE MEDICATION THAT WAS CALLED IN.

## 2024-07-29 LAB — B-HEM STREP SPEC QL CULT: NEGATIVE

## 2024-07-31 ENCOUNTER — ROUTINE PRENATAL (OUTPATIENT)
Dept: OBSTETRICS AND GYNECOLOGY | Facility: CLINIC | Age: 34
End: 2024-07-31
Payer: COMMERCIAL

## 2024-07-31 VITALS — WEIGHT: 221 LBS | SYSTOLIC BLOOD PRESSURE: 112 MMHG | DIASTOLIC BLOOD PRESSURE: 78 MMHG | BODY MASS INDEX: 36.78 KG/M2

## 2024-07-31 DIAGNOSIS — Z36.9 ENCOUNTER FOR ANTENATAL SCREENING, UNSPECIFIED: ICD-10-CM

## 2024-07-31 DIAGNOSIS — O36.60X0 EXCESSIVE FETAL GROWTH AFFECTING MANAGEMENT OF PREGNANCY, ANTEPARTUM, SINGLE OR UNSPECIFIED FETUS: ICD-10-CM

## 2024-07-31 DIAGNOSIS — Z3A.37 37 WEEKS GESTATION OF PREGNANCY: Primary | ICD-10-CM

## 2024-07-31 LAB
BILIRUB BLD-MCNC: NEGATIVE MG/DL
CLARITY, POC: CLEAR
COLOR UR: YELLOW
GLUCOSE UR STRIP-MCNC: NEGATIVE MG/DL
KETONES UR QL: NEGATIVE
LEUKOCYTE EST, POC: NEGATIVE
NITRITE UR-MCNC: NEGATIVE MG/ML
PH UR: 5 [PH] (ref 5–8)
PROT UR STRIP-MCNC: NEGATIVE MG/DL
RBC # UR STRIP: NEGATIVE /UL
SP GR UR: 1.03 (ref 1–1.03)
UROBILINOGEN UR QL: NORMAL

## 2024-07-31 NOTE — PROGRESS NOTES
OB follow up     Suha Raines is a 33 y.o.  37w1d being seen today for her obstetrical visit.  Patient reports no bleeding, no contractions, and no leaking. Fetal movement: normal.  History of LGA delivery.    Review of Systems  No bleeding, No cramping/contractions     /78   Wt 100 kg (221 lb)   LMP 10/30/2023 (Exact Date)   BMI 36.78 kg/m²     FHT: present BPM   Uterine Size:         Assessment/Plan:    1) 33 y.o.  -pregnancy at 37w1d    2)   Encounter Diagnoses   Name Primary?    37 weeks gestation of pregnancy Yes    Encounter for  screening, unspecified     Excessive fetal growth affecting management of pregnancy, antepartum, single or unspecified fetus    Plan growth at 38 weeks and consider induction at 39.  GBS was negative.    3) Reviewed this stage of pregnancy  4) Problem list updated     Return in about 1 week (around 2024) for OB US Renetta, Growth.    I spent 10 minutes caring for Suha on this date of service. This time includes time spent by me in the following activities: preparing for the visit, reviewing tests, performing a medically appropriate examination and/or evaluation, counseling and educating the patient/family/caregiver, and documenting information in the medical record.      Tayo Carrasco MD    2024  16:21 EDT

## 2024-07-31 NOTE — PROGRESS NOTES
OB follow up     Suha Raines is a 33 y.o.  37w1d being seen today for her obstetrical visit.  Patient reports no bleeding, no leaking, and occasional contractions. Fetal movement: normal    Review of Systems  No bleeding, No cramping/contractions     /78   Wt 100 kg (221 lb)   LMP 10/30/2023 (Exact Date)   BMI 36.78 kg/m²     FHT: present BPM   Uterine Size:         Assessment/Plan:    1) 33 y.o.  -pregnancy at 37w1d    2)   Encounter Diagnoses   Name Primary?    37 weeks gestation of pregnancy Yes    Encounter for  screening, unspecified     Excessive fetal growth affecting management of pregnancy, antepartum, single or unspecified fetus    70th percentile last scan.    3) Reviewed this stage of pregnancy  4) Problem list updated     Return in about 1 week (around 2024) for OB US [facility] Growth.    I spent 10 minutes caring for Suha on this date of service. This time includes time spent by me in the following activities: preparing for the visit, reviewing tests, performing a medically appropriate examination and/or evaluation, counseling and educating the patient/family/caregiver, and documenting information in the medical record.      Tayo Carrasco MD    2024  16:45 EDT

## 2024-08-07 ENCOUNTER — ROUTINE PRENATAL (OUTPATIENT)
Dept: OBSTETRICS AND GYNECOLOGY | Facility: CLINIC | Age: 34
End: 2024-08-07
Payer: COMMERCIAL

## 2024-08-07 VITALS — WEIGHT: 222 LBS | SYSTOLIC BLOOD PRESSURE: 130 MMHG | DIASTOLIC BLOOD PRESSURE: 70 MMHG | BODY MASS INDEX: 36.94 KG/M2

## 2024-08-07 DIAGNOSIS — Z36.9 ENCOUNTER FOR ANTENATAL SCREENING, UNSPECIFIED: Primary | ICD-10-CM

## 2024-08-07 DIAGNOSIS — I10 WHITE COAT SYNDROME WITH DIAGNOSIS OF HYPERTENSION: ICD-10-CM

## 2024-08-07 DIAGNOSIS — O36.63X0 MACROSOMIA OF FETUS AFFECTING MANAGEMENT OF MOTHER IN THIRD TRIMESTER, SINGLE OR UNSPECIFIED FETUS: ICD-10-CM

## 2024-08-07 DIAGNOSIS — J45.20 MILD INTERMITTENT ASTHMA WITHOUT COMPLICATION: ICD-10-CM

## 2024-08-07 DIAGNOSIS — Z87.59 HISTORY OF GESTATIONAL HYPERTENSION: ICD-10-CM

## 2024-08-07 DIAGNOSIS — O36.60X0 EXCESSIVE FETAL GROWTH AFFECTING MANAGEMENT OF PREGNANCY, ANTEPARTUM, SINGLE OR UNSPECIFIED FETUS: ICD-10-CM

## 2024-08-07 DIAGNOSIS — O09.899 SHORT INTERVAL BETWEEN PREGNANCIES AFFECTING PREGNANCY, ANTEPARTUM: ICD-10-CM

## 2024-08-07 DIAGNOSIS — Z3A.38 38 WEEKS GESTATION OF PREGNANCY: ICD-10-CM

## 2024-08-07 PROBLEM — K59.00 CONSTIPATION: Status: RESOLVED | Noted: 2024-01-15 | Resolved: 2024-08-07

## 2024-08-07 PROBLEM — O12.12 PROTEINURIA AFFECTING PREGNANCY IN SECOND TRIMESTER: Status: RESOLVED | Noted: 2024-05-29 | Resolved: 2024-08-07

## 2024-08-07 PROCEDURE — 0502F SUBSEQUENT PRENATAL CARE: CPT | Performed by: OBSTETRICS & GYNECOLOGY

## 2024-08-07 NOTE — PROGRESS NOTES
Chief Complaint   Patient presents with    Routine Prenatal Visit       HPI: 33 y.o.  at 38w1d here for ob internal & u/s for S>D    Relevant data reviewed:    Vitals:    24 1554   BP: 130/70   Weight: 101 kg (222 lb)     Total weight gain for pregnancy:  Not found.    Cx exam:  30/1/-3  Presentation: Vertex    Review of systems:   Gen: negative  CV:     negative  GI: negative  :   good fetal movement noted  and pelvic pressure  MS:    negative  Neuro: negative  Pul: negative    Physical Exam  Vitals reviewed.   Constitutional:       Appearance: Normal appearance.   HENT:      Head: Normocephalic and atraumatic.   Eyes:      Extraocular Movements: Extraocular movements intact.      Pupils: Pupils are equal, round, and reactive to light.   Cardiovascular:      Rate and Rhythm: Regular rhythm.   Pulmonary:      Effort: Pulmonary effort is normal.   Abdominal:      Comments: pregnant   Genitourinary:     General: Normal vulva.   Musculoskeletal:         General: Normal range of motion.      Cervical back: Normal range of motion.   Skin:     General: Skin is warm and dry.   Neurological:      General: No focal deficit present.      Mental Status: She is alert.   Psychiatric:         Mood and Affect: Mood normal.             Results for orders placed in visit on 24    US OB Follow Up Transabdominal Approach    Narrative  U/s for growth:  Ceph, EFW >99% (4721g,10-7),  anteroleftlateral plac,  MELISSA = 18.9cm, +breathing and movement       A/P  1. Intrauterine pregnancy at 38w1d   2. Pregnancy Risk:  COMPLICATED    Diagnoses and all orders for this visit:    1. Encounter for  screening, unspecified (Primary)  -     POC Urinalysis Dipstick    2. Mild intermittent asthma without complication    3. 38 weeks gestation of pregnancy    4. Short interval between pregnancies affecting pregnancy, antepartum-  2023    5. History of gestational hypertension- currently pregnant    6. White coat syndrome  with diagnosis of hypertension    7. Excessive fetal growth affecting management of pregnancy, antepartum, single or unspecified fetus    8. Macrosomia of fetus affecting management of mother in third trimester, single or unspecified fetus  Overview:  EFW = 99%,4721g,10-24           labor was discussed.  Warnings were provided.  Routine labor warnings were discussed and indications for L & D f/u including bleeding, regular contractions, decreased fetal movement or/and rupture of membranes.   Detailed instructions for  were provided  -----------------------  PLAN:     IOL @ 39 weeks.  Pt aware of diagnosis of macrosomia.  Declines Prim C/S despite discussion of risks/complications, birth injury, etc.   Pt very tearful and anxious  R/b/a explained to pt of IOL v. Prim C/S and she and her  verbalize their understanding.     Nav Pastrana MD  2024 16:33 EDT

## 2024-08-08 ENCOUNTER — TELEPHONE (OUTPATIENT)
Dept: OBSTETRICS AND GYNECOLOGY | Facility: CLINIC | Age: 34
End: 2024-08-08

## 2024-08-08 NOTE — TELEPHONE ENCOUNTER
Provider: DR GAN    Caller: DEVAN TRISTAN    Relationship to Patient: SELF    Phone Number: [phone]    Reason for Call: OB PT CALLED STATED SHE HAS QUESTIONS ABOUT ; PT IS SCHEDULED TO BE INDUCED.    PLEASE CALL PT TO ADVISE.

## 2024-08-09 ENCOUNTER — TELEPHONE (OUTPATIENT)
Dept: OBSTETRICS AND GYNECOLOGY | Facility: HOSPITAL | Age: 34
End: 2024-08-09

## 2024-08-09 ENCOUNTER — TELEPHONE (OUTPATIENT)
Dept: OBSTETRICS AND GYNECOLOGY | Facility: CLINIC | Age: 34
End: 2024-08-09
Payer: COMMERCIAL

## 2024-08-09 NOTE — TELEPHONE ENCOUNTER
Spoke with Suha today via phone @ 1869. Went over ERAS instructions and answered questions regarding anesthesia and kangaroo care in c/s. She is aware she is to take x2 tylenol 500 mg tablets orally Monday evening of 8/12/24 and that she is to drink 20 oz of gatorade (not red) two hours before her given arrival time to the hospital which is 0530 am on Tuesday 8/13/24.-SERVANDO Pablo

## 2024-08-09 NOTE — TELEPHONE ENCOUNTER
Patient called back and has decided she wants to proceed with a primary section due to macrosomia. She is currently scheduled for an IOL on Tuesday, 8/13/24. I was unable to reach Dr. Pastrana to see which day she needed to be scheduled for. I am out off the office Monday, Leanne is going to try and reach him to find out when.

## 2024-08-12 ENCOUNTER — PREP FOR SURGERY (OUTPATIENT)
Dept: OTHER | Facility: HOSPITAL | Age: 34
End: 2024-08-12
Payer: COMMERCIAL

## 2024-08-12 ENCOUNTER — ANESTHESIA EVENT (OUTPATIENT)
Dept: OBSTETRICS AND GYNECOLOGY | Facility: HOSPITAL | Age: 34
End: 2024-08-12
Payer: COMMERCIAL

## 2024-08-12 ENCOUNTER — TELEPHONE (OUTPATIENT)
Dept: OBSTETRICS AND GYNECOLOGY | Facility: CLINIC | Age: 34
End: 2024-08-12
Payer: COMMERCIAL

## 2024-08-12 DIAGNOSIS — O36.60X0 EXCESSIVE FETAL GROWTH AFFECTING MANAGEMENT OF PREGNANCY, ANTEPARTUM, SINGLE OR UNSPECIFIED FETUS: Primary | ICD-10-CM

## 2024-08-12 PROCEDURE — S0260 H&P FOR SURGERY: HCPCS | Performed by: OBSTETRICS & GYNECOLOGY

## 2024-08-12 RX ORDER — MISOPROSTOL 200 UG/1
800 TABLET ORAL ONCE AS NEEDED
Status: CANCELLED | OUTPATIENT
Start: 2024-08-12

## 2024-08-12 RX ORDER — SODIUM CHLORIDE 0.9 % (FLUSH) 0.9 %
10 SYRINGE (ML) INJECTION EVERY 12 HOURS SCHEDULED
Status: CANCELLED | OUTPATIENT
Start: 2024-08-12

## 2024-08-12 RX ORDER — KETOROLAC TROMETHAMINE 30 MG/ML
30 INJECTION, SOLUTION INTRAMUSCULAR; INTRAVENOUS ONCE
Status: CANCELLED | OUTPATIENT
Start: 2024-08-12 | End: 2024-08-12

## 2024-08-12 RX ORDER — OXYTOCIN/0.9 % SODIUM CHLORIDE 30/500 ML
999 PLASTIC BAG, INJECTION (ML) INTRAVENOUS ONCE
Status: CANCELLED | OUTPATIENT
Start: 2024-08-12

## 2024-08-12 RX ORDER — SODIUM CHLORIDE 0.9 % (FLUSH) 0.9 %
10 SYRINGE (ML) INJECTION AS NEEDED
Status: CANCELLED | OUTPATIENT
Start: 2024-08-12

## 2024-08-12 RX ORDER — SODIUM CHLORIDE, SODIUM LACTATE, POTASSIUM CHLORIDE, CALCIUM CHLORIDE 600; 310; 30; 20 MG/100ML; MG/100ML; MG/100ML; MG/100ML
125 INJECTION, SOLUTION INTRAVENOUS CONTINUOUS
Status: CANCELLED | OUTPATIENT
Start: 2024-08-12

## 2024-08-12 RX ORDER — ACETAMINOPHEN 500 MG
1000 TABLET ORAL ONCE
Status: CANCELLED | OUTPATIENT
Start: 2024-08-12 | End: 2024-08-12

## 2024-08-12 RX ORDER — METHYLERGONOVINE MALEATE 0.2 MG/ML
200 INJECTION INTRAVENOUS ONCE AS NEEDED
Status: CANCELLED | OUTPATIENT
Start: 2024-08-12

## 2024-08-12 RX ORDER — CARBOPROST TROMETHAMINE 250 UG/ML
250 INJECTION, SOLUTION INTRAMUSCULAR
Status: CANCELLED | OUTPATIENT
Start: 2024-08-12

## 2024-08-12 RX ORDER — LIDOCAINE HYDROCHLORIDE 10 MG/ML
0.5 INJECTION, SOLUTION INFILTRATION; PERINEURAL ONCE AS NEEDED
Status: CANCELLED | OUTPATIENT
Start: 2024-08-12

## 2024-08-12 RX ORDER — OXYTOCIN/0.9 % SODIUM CHLORIDE 30/500 ML
250 PLASTIC BAG, INJECTION (ML) INTRAVENOUS CONTINUOUS
Status: CANCELLED | OUTPATIENT
Start: 2024-08-12 | End: 2024-08-12

## 2024-08-12 RX ORDER — SODIUM CHLORIDE 9 MG/ML
40 INJECTION, SOLUTION INTRAVENOUS AS NEEDED
Status: CANCELLED | OUTPATIENT
Start: 2024-08-12

## 2024-08-13 ENCOUNTER — ANESTHESIA (OUTPATIENT)
Dept: OBSTETRICS AND GYNECOLOGY | Facility: HOSPITAL | Age: 34
End: 2024-08-13
Payer: COMMERCIAL

## 2024-08-13 ENCOUNTER — HOSPITAL ENCOUNTER (INPATIENT)
Facility: HOSPITAL | Age: 34
LOS: 2 days | Discharge: HOME | End: 2024-08-15
Attending: OBSTETRICS & GYNECOLOGY | Admitting: OBSTETRICS & GYNECOLOGY
Payer: COMMERCIAL

## 2024-08-13 DIAGNOSIS — O36.60X0 EXCESSIVE FETAL GROWTH AFFECTING MANAGEMENT OF PREGNANCY, ANTEPARTUM, SINGLE OR UNSPECIFIED FETUS: ICD-10-CM

## 2024-08-13 DIAGNOSIS — K59.00 CONSTIPATION, UNSPECIFIED CONSTIPATION TYPE: ICD-10-CM

## 2024-08-13 LAB
ABO GROUP BLD: NORMAL
ALBUMIN SERPL-MCNC: 3.3 G/DL (ref 3.5–5.2)
ALBUMIN/GLOB SERPL: 1.3 G/DL
ALP SERPL-CCNC: 136 U/L (ref 39–117)
ALT SERPL W P-5'-P-CCNC: 14 U/L (ref 1–33)
AMPHET+METHAMPHET UR QL: NEGATIVE
AMPHETAMINES UR QL: NEGATIVE
ANION GAP SERPL CALCULATED.3IONS-SCNC: 12 MMOL/L (ref 5–15)
ANION GAP SERPL CALCULATED.3IONS-SCNC: 14.5 MMOL/L (ref 5–15)
AST SERPL-CCNC: 25 U/L (ref 1–32)
BARBITURATES UR QL SCN: NEGATIVE
BENZODIAZ UR QL SCN: NEGATIVE
BILIRUB SERPL-MCNC: 0.6 MG/DL (ref 0–1.2)
BLD GP AB SCN SERPL QL: NEGATIVE
BUN SERPL-MCNC: 7 MG/DL (ref 6–20)
BUN SERPL-MCNC: 8 MG/DL (ref 6–20)
BUN/CREAT SERPL: 14.3 (ref 7–25)
BUN/CREAT SERPL: 17.4 (ref 7–25)
BUPRENORPHINE SERPL-MCNC: NEGATIVE NG/ML
CALCIUM SPEC-SCNC: 8.8 MG/DL (ref 8.6–10.5)
CALCIUM SPEC-SCNC: 9.1 MG/DL (ref 8.6–10.5)
CANNABINOIDS SERPL QL: NEGATIVE
CHLORIDE SERPL-SCNC: 103 MMOL/L (ref 98–107)
CHLORIDE SERPL-SCNC: 104 MMOL/L (ref 98–107)
CO2 SERPL-SCNC: 18.5 MMOL/L (ref 22–29)
CO2 SERPL-SCNC: 19 MMOL/L (ref 22–29)
COCAINE UR QL: NEGATIVE
CREAT SERPL-MCNC: 0.46 MG/DL (ref 0.57–1)
CREAT SERPL-MCNC: 0.49 MG/DL (ref 0.57–1)
DEPRECATED RDW RBC AUTO: 44.5 FL (ref 37–54)
DEPRECATED RDW RBC AUTO: 46 FL (ref 37–54)
EGFRCR SERPLBLD CKD-EPI 2021: 128.6 ML/MIN/1.73
EGFRCR SERPLBLD CKD-EPI 2021: 130.6 ML/MIN/1.73
ERYTHROCYTE [DISTWIDTH] IN BLOOD BY AUTOMATED COUNT: 12.6 % (ref 12.3–15.4)
ERYTHROCYTE [DISTWIDTH] IN BLOOD BY AUTOMATED COUNT: 12.8 % (ref 12.3–15.4)
GLOBULIN UR ELPH-MCNC: 2.5 GM/DL
GLUCOSE SERPL-MCNC: 108 MG/DL (ref 65–99)
GLUCOSE SERPL-MCNC: 97 MG/DL (ref 65–99)
HCT VFR BLD AUTO: 33 % (ref 34–46.6)
HCT VFR BLD AUTO: 34.2 % (ref 34–46.6)
HGB BLD-MCNC: 11.4 G/DL (ref 12–15.9)
HGB BLD-MCNC: 12.1 G/DL (ref 12–15.9)
MCH RBC QN AUTO: 33.9 PG (ref 26.6–33)
MCH RBC QN AUTO: 34 PG (ref 26.6–33)
MCHC RBC AUTO-ENTMCNC: 34.5 G/DL (ref 31.5–35.7)
MCHC RBC AUTO-ENTMCNC: 35.4 G/DL (ref 31.5–35.7)
MCV RBC AUTO: 96.1 FL (ref 79–97)
MCV RBC AUTO: 98.2 FL (ref 79–97)
METHADONE UR QL SCN: NEGATIVE
OPIATES UR QL: NEGATIVE
OXYCODONE UR QL SCN: NEGATIVE
PCP UR QL SCN: NEGATIVE
PLATELET # BLD AUTO: 179 10*3/MM3 (ref 140–450)
PLATELET # BLD AUTO: 202 10*3/MM3 (ref 140–450)
PMV BLD AUTO: 11 FL (ref 6–12)
PMV BLD AUTO: 11.1 FL (ref 6–12)
POTASSIUM SERPL-SCNC: 3.5 MMOL/L (ref 3.5–5.2)
POTASSIUM SERPL-SCNC: 3.7 MMOL/L (ref 3.5–5.2)
PROT SERPL-MCNC: 5.8 G/DL (ref 6–8.5)
RBC # BLD AUTO: 3.36 10*6/MM3 (ref 3.77–5.28)
RBC # BLD AUTO: 3.56 10*6/MM3 (ref 3.77–5.28)
RH BLD: POSITIVE
SODIUM SERPL-SCNC: 135 MMOL/L (ref 136–145)
SODIUM SERPL-SCNC: 136 MMOL/L (ref 136–145)
T&S EXPIRATION DATE: NORMAL
TREPONEMA PALLIDUM IGG+IGM AB [PRESENCE] IN SERUM OR PLASMA BY IMMUNOASSAY: NORMAL
TRICYCLICS UR QL SCN: NEGATIVE
WBC NRBC COR # BLD AUTO: 12.26 10*3/MM3 (ref 3.4–10.8)
WBC NRBC COR # BLD AUTO: 8.32 10*3/MM3 (ref 3.4–10.8)

## 2024-08-13 PROCEDURE — 59514 CESAREAN DELIVERY ONLY: CPT | Performed by: SPECIALIST/TECHNOLOGIST, OTHER

## 2024-08-13 PROCEDURE — 25010000002 PHENYLEPHRINE 10 MG/ML SOLUTION

## 2024-08-13 PROCEDURE — 25010000002 KETOROLAC TROMETHAMINE PER 15 MG: Performed by: OBSTETRICS & GYNECOLOGY

## 2024-08-13 PROCEDURE — 86901 BLOOD TYPING SEROLOGIC RH(D): CPT | Performed by: STUDENT IN AN ORGANIZED HEALTH CARE EDUCATION/TRAINING PROGRAM

## 2024-08-13 PROCEDURE — 25010000002 MORPHINE PER 10 MG

## 2024-08-13 PROCEDURE — 25010000002 ONDANSETRON PER 1 MG

## 2024-08-13 PROCEDURE — 80053 COMPREHEN METABOLIC PANEL: CPT | Performed by: STUDENT IN AN ORGANIZED HEALTH CARE EDUCATION/TRAINING PROGRAM

## 2024-08-13 PROCEDURE — 25010000002 PROMETHAZINE PER 50 MG: Performed by: OBSTETRICS & GYNECOLOGY

## 2024-08-13 PROCEDURE — 25810000003 LACTATED RINGERS PER 1000 ML: Performed by: STUDENT IN AN ORGANIZED HEALTH CARE EDUCATION/TRAINING PROGRAM

## 2024-08-13 PROCEDURE — 25010000002 BUPIVACAINE PF 0.75 % SOLUTION

## 2024-08-13 PROCEDURE — 25010000002 KETOROLAC TROMETHAMINE PER 15 MG: Performed by: STUDENT IN AN ORGANIZED HEALTH CARE EDUCATION/TRAINING PROGRAM

## 2024-08-13 PROCEDURE — 25010000002 FENTANYL CITRATE (PF) 50 MCG/ML SOLUTION

## 2024-08-13 PROCEDURE — 86900 BLOOD TYPING SEROLOGIC ABO: CPT | Performed by: STUDENT IN AN ORGANIZED HEALTH CARE EDUCATION/TRAINING PROGRAM

## 2024-08-13 PROCEDURE — 88307 TISSUE EXAM BY PATHOLOGIST: CPT

## 2024-08-13 PROCEDURE — 85027 COMPLETE CBC AUTOMATED: CPT | Performed by: STUDENT IN AN ORGANIZED HEALTH CARE EDUCATION/TRAINING PROGRAM

## 2024-08-13 PROCEDURE — 25810000003 SODIUM CHLORIDE 0.9 % SOLUTION 250 ML FLEX CONT: Performed by: STUDENT IN AN ORGANIZED HEALTH CARE EDUCATION/TRAINING PROGRAM

## 2024-08-13 PROCEDURE — 25010000002 CEFAZOLIN PER 500 MG: Performed by: STUDENT IN AN ORGANIZED HEALTH CARE EDUCATION/TRAINING PROGRAM

## 2024-08-13 PROCEDURE — 59510 CESAREAN DELIVERY: CPT | Performed by: OBSTETRICS & GYNECOLOGY

## 2024-08-13 PROCEDURE — 94799 UNLISTED PULMONARY SVC/PX: CPT

## 2024-08-13 PROCEDURE — 85027 COMPLETE CBC AUTOMATED: CPT | Performed by: OBSTETRICS & GYNECOLOGY

## 2024-08-13 PROCEDURE — 25810000003 LACTATED RINGERS SOLUTION: Performed by: STUDENT IN AN ORGANIZED HEALTH CARE EDUCATION/TRAINING PROGRAM

## 2024-08-13 PROCEDURE — 25010000002 DIPHENHYDRAMINE PER 50 MG

## 2024-08-13 PROCEDURE — 25010000002 AZITHROMYCIN PER 500 MG: Performed by: STUDENT IN AN ORGANIZED HEALTH CARE EDUCATION/TRAINING PROGRAM

## 2024-08-13 PROCEDURE — 86780 TREPONEMA PALLIDUM: CPT | Performed by: STUDENT IN AN ORGANIZED HEALTH CARE EDUCATION/TRAINING PROGRAM

## 2024-08-13 PROCEDURE — 80306 DRUG TEST PRSMV INSTRMNT: CPT | Performed by: OBSTETRICS & GYNECOLOGY

## 2024-08-13 PROCEDURE — 86850 RBC ANTIBODY SCREEN: CPT | Performed by: STUDENT IN AN ORGANIZED HEALTH CARE EDUCATION/TRAINING PROGRAM

## 2024-08-13 RX ORDER — SODIUM CHLORIDE 0.9 % (FLUSH) 0.9 %
10 SYRINGE (ML) INJECTION EVERY 12 HOURS SCHEDULED
Status: DISCONTINUED | OUTPATIENT
Start: 2024-08-13 | End: 2024-08-15 | Stop reason: HOSPADM

## 2024-08-13 RX ORDER — MISOPROSTOL 200 UG/1
TABLET ORAL
Status: COMPLETED
Start: 2024-08-13 | End: 2024-08-13

## 2024-08-13 RX ORDER — OXYTOCIN/0.9 % SODIUM CHLORIDE 30/500 ML
999 PLASTIC BAG, INJECTION (ML) INTRAVENOUS ONCE
Status: COMPLETED | OUTPATIENT
Start: 2024-08-13 | End: 2024-08-13

## 2024-08-13 RX ORDER — POLYETHYLENE GLYCOL 3350 17 G/17G
17 POWDER, FOR SOLUTION ORAL DAILY
Status: DISCONTINUED | OUTPATIENT
Start: 2024-08-13 | End: 2024-08-15 | Stop reason: HOSPADM

## 2024-08-13 RX ORDER — ACETAMINOPHEN 500 MG
1000 TABLET ORAL ONCE
Status: COMPLETED | OUTPATIENT
Start: 2024-08-13 | End: 2024-08-13

## 2024-08-13 RX ORDER — CARBOPROST TROMETHAMINE 250 UG/ML
250 INJECTION, SOLUTION INTRAMUSCULAR
Status: DISCONTINUED | OUTPATIENT
Start: 2024-08-13 | End: 2024-08-15 | Stop reason: HOSPADM

## 2024-08-13 RX ORDER — ACETAMINOPHEN 325 MG/1
650 TABLET ORAL EVERY 6 HOURS
Status: DISCONTINUED | OUTPATIENT
Start: 2024-08-14 | End: 2024-08-15 | Stop reason: HOSPADM

## 2024-08-13 RX ORDER — BUPIVACAINE HYDROCHLORIDE 7.5 MG/ML
INJECTION, SOLUTION EPIDURAL; RETROBULBAR
Status: COMPLETED | OUTPATIENT
Start: 2024-08-13 | End: 2024-08-13

## 2024-08-13 RX ORDER — OXYCODONE HYDROCHLORIDE 5 MG/1
10 TABLET ORAL EVERY 4 HOURS PRN
Status: DISCONTINUED | OUTPATIENT
Start: 2024-08-13 | End: 2024-08-15 | Stop reason: HOSPADM

## 2024-08-13 RX ORDER — FAMOTIDINE 10 MG/ML
INJECTION, SOLUTION INTRAVENOUS
Status: COMPLETED
Start: 2024-08-13 | End: 2024-08-13

## 2024-08-13 RX ORDER — KETOROLAC TROMETHAMINE 30 MG/ML
30 INJECTION, SOLUTION INTRAMUSCULAR; INTRAVENOUS ONCE
Status: COMPLETED | OUTPATIENT
Start: 2024-08-13 | End: 2024-08-13

## 2024-08-13 RX ORDER — SODIUM CHLORIDE 0.9 % (FLUSH) 0.9 %
10 SYRINGE (ML) INJECTION AS NEEDED
Status: DISCONTINUED | OUTPATIENT
Start: 2024-08-13 | End: 2024-08-15 | Stop reason: HOSPADM

## 2024-08-13 RX ORDER — PHENYLEPHRINE HYDROCHLORIDE 10 MG/ML
INJECTION INTRAVENOUS AS NEEDED
Status: DISCONTINUED | OUTPATIENT
Start: 2024-08-13 | End: 2024-08-13 | Stop reason: SURG

## 2024-08-13 RX ORDER — OXYTOCIN/0.9 % SODIUM CHLORIDE 30/500 ML
250 PLASTIC BAG, INJECTION (ML) INTRAVENOUS CONTINUOUS
Status: ACTIVE | OUTPATIENT
Start: 2024-08-13 | End: 2024-08-13

## 2024-08-13 RX ORDER — OXYTOCIN/0.9 % SODIUM CHLORIDE 30/500 ML
PLASTIC BAG, INJECTION (ML) INTRAVENOUS CONTINUOUS PRN
Status: DISCONTINUED | OUTPATIENT
Start: 2024-08-13 | End: 2024-08-13 | Stop reason: SURG

## 2024-08-13 RX ORDER — OXYCODONE HYDROCHLORIDE 5 MG/1
5 TABLET ORAL EVERY 4 HOURS PRN
Status: DISCONTINUED | OUTPATIENT
Start: 2024-08-13 | End: 2024-08-15 | Stop reason: HOSPADM

## 2024-08-13 RX ORDER — ONDANSETRON 2 MG/ML
4 INJECTION INTRAMUSCULAR; INTRAVENOUS ONCE AS NEEDED
Status: COMPLETED | OUTPATIENT
Start: 2024-08-13 | End: 2024-08-13

## 2024-08-13 RX ORDER — SODIUM CHLORIDE 9 MG/ML
40 INJECTION, SOLUTION INTRAVENOUS AS NEEDED
Status: DISCONTINUED | OUTPATIENT
Start: 2024-08-13 | End: 2024-08-15 | Stop reason: HOSPADM

## 2024-08-13 RX ORDER — MISOPROSTOL 200 UG/1
800 TABLET ORAL ONCE AS NEEDED
Status: COMPLETED | OUTPATIENT
Start: 2024-08-13 | End: 2024-08-13

## 2024-08-13 RX ORDER — DOCUSATE SODIUM 100 MG/1
100 CAPSULE, LIQUID FILLED ORAL 2 TIMES DAILY
Status: DISCONTINUED | OUTPATIENT
Start: 2024-08-13 | End: 2024-08-15 | Stop reason: HOSPADM

## 2024-08-13 RX ORDER — MORPHINE SULFATE 1 MG/ML
INJECTION, SOLUTION EPIDURAL; INTRATHECAL; INTRAVENOUS AS NEEDED
Status: DISCONTINUED | OUTPATIENT
Start: 2024-08-13 | End: 2024-08-13

## 2024-08-13 RX ORDER — SODIUM CHLORIDE, SODIUM LACTATE, POTASSIUM CHLORIDE, CALCIUM CHLORIDE 600; 310; 30; 20 MG/100ML; MG/100ML; MG/100ML; MG/100ML
125 INJECTION, SOLUTION INTRAVENOUS CONTINUOUS
Status: DISCONTINUED | OUTPATIENT
Start: 2024-08-13 | End: 2024-08-15 | Stop reason: HOSPADM

## 2024-08-13 RX ORDER — FAMOTIDINE 10 MG/ML
INJECTION, SOLUTION INTRAVENOUS AS NEEDED
Status: DISCONTINUED | OUTPATIENT
Start: 2024-08-13 | End: 2024-08-13 | Stop reason: SURG

## 2024-08-13 RX ORDER — DIPHENHYDRAMINE HYDROCHLORIDE 50 MG/ML
25 INJECTION INTRAMUSCULAR; INTRAVENOUS EVERY 4 HOURS PRN
Status: DISCONTINUED | OUTPATIENT
Start: 2024-08-13 | End: 2024-08-15 | Stop reason: HOSPADM

## 2024-08-13 RX ORDER — PRENATAL VIT/IRON FUM/FOLIC AC 27MG-0.8MG
1 TABLET ORAL DAILY
Status: DISCONTINUED | OUTPATIENT
Start: 2024-08-13 | End: 2024-08-15 | Stop reason: HOSPADM

## 2024-08-13 RX ORDER — METHYLERGONOVINE MALEATE 0.2 MG/ML
200 INJECTION INTRAVENOUS ONCE AS NEEDED
Status: DISCONTINUED | OUTPATIENT
Start: 2024-08-13 | End: 2024-08-15 | Stop reason: HOSPADM

## 2024-08-13 RX ORDER — FENTANYL CITRATE 50 UG/ML
INJECTION, SOLUTION INTRAMUSCULAR; INTRAVENOUS AS NEEDED
Status: DISCONTINUED | OUTPATIENT
Start: 2024-08-13 | End: 2024-08-13 | Stop reason: SURG

## 2024-08-13 RX ORDER — MORPHINE SULFATE 1 MG/ML
INJECTION, SOLUTION EPIDURAL; INTRATHECAL; INTRAVENOUS AS NEEDED
Status: DISCONTINUED | OUTPATIENT
Start: 2024-08-13 | End: 2024-08-13 | Stop reason: SURG

## 2024-08-13 RX ORDER — ONDANSETRON 4 MG/1
4 TABLET, ORALLY DISINTEGRATING ORAL EVERY 8 HOURS PRN
Status: DISCONTINUED | OUTPATIENT
Start: 2024-08-13 | End: 2024-08-15 | Stop reason: HOSPADM

## 2024-08-13 RX ORDER — KETOROLAC TROMETHAMINE 30 MG/ML
15 INJECTION, SOLUTION INTRAMUSCULAR; INTRAVENOUS EVERY 6 HOURS
Status: DISPENSED | OUTPATIENT
Start: 2024-08-13 | End: 2024-08-14

## 2024-08-13 RX ORDER — ACETAMINOPHEN 500 MG
1000 TABLET ORAL EVERY 6 HOURS
Status: DISPENSED | OUTPATIENT
Start: 2024-08-13 | End: 2024-08-14

## 2024-08-13 RX ORDER — LIDOCAINE HYDROCHLORIDE 10 MG/ML
0.5 INJECTION, SOLUTION INFILTRATION; PERINEURAL ONCE AS NEEDED
Status: DISCONTINUED | OUTPATIENT
Start: 2024-08-13 | End: 2024-08-15 | Stop reason: HOSPADM

## 2024-08-13 RX ORDER — EPHEDRINE SULFATE 5 MG/ML
INJECTION INTRAVENOUS AS NEEDED
Status: DISCONTINUED | OUTPATIENT
Start: 2024-08-13 | End: 2024-08-13 | Stop reason: SURG

## 2024-08-13 RX ORDER — OXYTOCIN/0.9 % SODIUM CHLORIDE 30/500 ML
PLASTIC BAG, INJECTION (ML) INTRAVENOUS
Status: COMPLETED
Start: 2024-08-13 | End: 2024-08-13

## 2024-08-13 RX ORDER — ENOXAPARIN SODIUM 100 MG/ML
40 INJECTION SUBCUTANEOUS EVERY 24 HOURS
Status: DISCONTINUED | OUTPATIENT
Start: 2024-08-14 | End: 2024-08-15 | Stop reason: HOSPADM

## 2024-08-13 RX ORDER — FAMOTIDINE 20 MG/1
20 TABLET, FILM COATED ORAL
Status: DISCONTINUED | OUTPATIENT
Start: 2024-08-13 | End: 2024-08-13

## 2024-08-13 RX ORDER — FENTANYL CITRATE 50 UG/ML
INJECTION, SOLUTION INTRAMUSCULAR; INTRAVENOUS AS NEEDED
Status: DISCONTINUED | OUTPATIENT
Start: 2024-08-13 | End: 2024-08-13

## 2024-08-13 RX ORDER — FAMOTIDINE 20 MG/1
20 TABLET, FILM COATED ORAL
Status: DISCONTINUED | OUTPATIENT
Start: 2024-08-13 | End: 2024-08-15 | Stop reason: HOSPADM

## 2024-08-13 RX ORDER — DIPHENHYDRAMINE HCL 25 MG
25 CAPSULE ORAL EVERY 4 HOURS PRN
Status: DISCONTINUED | OUTPATIENT
Start: 2024-08-13 | End: 2024-08-15 | Stop reason: HOSPADM

## 2024-08-13 RX ORDER — ONDANSETRON 2 MG/ML
INJECTION INTRAMUSCULAR; INTRAVENOUS AS NEEDED
Status: DISCONTINUED | OUTPATIENT
Start: 2024-08-13 | End: 2024-08-13 | Stop reason: SURG

## 2024-08-13 RX ORDER — DIPHENHYDRAMINE HYDROCHLORIDE 50 MG/ML
INJECTION INTRAMUSCULAR; INTRAVENOUS AS NEEDED
Status: DISCONTINUED | OUTPATIENT
Start: 2024-08-13 | End: 2024-08-13 | Stop reason: SURG

## 2024-08-13 RX ORDER — IBUPROFEN 600 MG/1
600 TABLET ORAL EVERY 6 HOURS
Status: DISCONTINUED | OUTPATIENT
Start: 2024-08-14 | End: 2024-08-15 | Stop reason: HOSPADM

## 2024-08-13 RX ORDER — OXYTOCIN/0.9 % SODIUM CHLORIDE 30/500 ML
125 PLASTIC BAG, INJECTION (ML) INTRAVENOUS ONCE AS NEEDED
Status: DISCONTINUED | OUTPATIENT
Start: 2024-08-13 | End: 2024-08-15 | Stop reason: HOSPADM

## 2024-08-13 RX ADMIN — DIPHENHYDRAMINE HYDROCHLORIDE 6.25 MG: 50 INJECTION INTRAMUSCULAR; INTRAVENOUS at 07:24

## 2024-08-13 RX ADMIN — AZITHROMYCIN 500 MG: 500 INJECTION, POWDER, LYOPHILIZED, FOR SOLUTION INTRAVENOUS at 07:24

## 2024-08-13 RX ADMIN — MISOPROSTOL 800 MCG: 200 TABLET ORAL at 08:00

## 2024-08-13 RX ADMIN — SODIUM CHLORIDE, POTASSIUM CHLORIDE, SODIUM LACTATE AND CALCIUM CHLORIDE: 600; 310; 30; 20 INJECTION, SOLUTION INTRAVENOUS at 07:24

## 2024-08-13 RX ADMIN — FAMOTIDINE 20 MG: 10 INJECTION INTRAVENOUS at 07:24

## 2024-08-13 RX ADMIN — KETOROLAC TROMETHAMINE 30 MG: 30 INJECTION, SOLUTION INTRAMUSCULAR; INTRAVENOUS at 08:55

## 2024-08-13 RX ADMIN — ACETAMINOPHEN 1000 MG: 500 TABLET ORAL at 06:05

## 2024-08-13 RX ADMIN — FENTANYL CITRATE 10 MCG: 50 INJECTION, SOLUTION INTRAMUSCULAR; INTRAVENOUS at 07:32

## 2024-08-13 RX ADMIN — PRENATAL VIT W/ FE FUMARATE-FA TAB 27-0.8 MG 1 TABLET: 27-0.8 TAB at 12:34

## 2024-08-13 RX ADMIN — KETOROLAC TROMETHAMINE 15 MG: 30 INJECTION, SOLUTION INTRAMUSCULAR; INTRAVENOUS at 15:27

## 2024-08-13 RX ADMIN — OXYTOCIN-SODIUM CHLORIDE 0.9% IV SOLN 30 UNIT/500ML 250 ML/HR: 30-0.9/5 SOLUTION at 08:41

## 2024-08-13 RX ADMIN — EPHEDRINE SULFATE 10 MG: 5 INJECTION INTRAVENOUS at 07:34

## 2024-08-13 RX ADMIN — PHENYLEPHRINE HYDROCHLORIDE 100 MCG: 10 INJECTION INTRAVENOUS at 07:44

## 2024-08-13 RX ADMIN — ACETAMINOPHEN 1000 MG: 500 TABLET ORAL at 18:22

## 2024-08-13 RX ADMIN — EPHEDRINE SULFATE 10 MG: 5 INJECTION INTRAVENOUS at 07:50

## 2024-08-13 RX ADMIN — SODIUM CHLORIDE, POTASSIUM CHLORIDE, SODIUM LACTATE AND CALCIUM CHLORIDE 1000 ML: 600; 310; 30; 20 INJECTION, SOLUTION INTRAVENOUS at 06:27

## 2024-08-13 RX ADMIN — CEFAZOLIN 2 G: 2 INJECTION, POWDER, FOR SOLUTION INTRAMUSCULAR; INTRAVENOUS at 07:34

## 2024-08-13 RX ADMIN — FAMOTIDINE 20 MG: 20 TABLET, FILM COATED ORAL at 15:59

## 2024-08-13 RX ADMIN — PROMETHAZINE HYDROCHLORIDE 12.5 MG: 25 INJECTION INTRAMUSCULAR; INTRAVENOUS at 15:54

## 2024-08-13 RX ADMIN — SODIUM CHLORIDE, POTASSIUM CHLORIDE, SODIUM LACTATE AND CALCIUM CHLORIDE 125 ML/HR: 600; 310; 30; 20 INJECTION, SOLUTION INTRAVENOUS at 15:26

## 2024-08-13 RX ADMIN — BUPIVACAINE HYDROCHLORIDE 1.8 ML: 7.5 INJECTION, SOLUTION EPIDURAL; RETROBULBAR at 07:32

## 2024-08-13 RX ADMIN — Medication 999 ML/HR: at 08:30

## 2024-08-13 RX ADMIN — KETOROLAC TROMETHAMINE 15 MG: 30 INJECTION, SOLUTION INTRAMUSCULAR; INTRAVENOUS at 20:59

## 2024-08-13 RX ADMIN — OXYTOCIN-SODIUM CHLORIDE 0.9% IV SOLN 30 UNIT/500ML 250 ML/HR: 30-0.9/5 SOLUTION at 07:54

## 2024-08-13 RX ADMIN — ONDANSETRON 4 MG: 2 INJECTION INTRAMUSCULAR; INTRAVENOUS at 14:00

## 2024-08-13 RX ADMIN — ACETAMINOPHEN 1000 MG: 500 TABLET ORAL at 12:34

## 2024-08-13 RX ADMIN — DOCUSATE SODIUM 100 MG: 100 CAPSULE, LIQUID FILLED ORAL at 12:33

## 2024-08-13 RX ADMIN — MORPHINE SULFATE 0.15 MG: 1 INJECTION, SOLUTION EPIDURAL; INTRATHECAL; INTRAVENOUS at 07:32

## 2024-08-13 RX ADMIN — PHENYLEPHRINE HYDROCHLORIDE 200 MCG: 10 INJECTION INTRAVENOUS at 07:38

## 2024-08-13 RX ADMIN — DOCUSATE SODIUM 100 MG: 100 CAPSULE, LIQUID FILLED ORAL at 20:59

## 2024-08-13 RX ADMIN — ONDANSETRON 4 MG: 2 INJECTION INTRAMUSCULAR; INTRAVENOUS at 07:24

## 2024-08-13 RX ADMIN — Medication 10 ML: at 20:59

## 2024-08-13 RX ADMIN — PHENYLEPHRINE HYDROCHLORIDE 100 MCG: 10 INJECTION INTRAVENOUS at 08:03

## 2024-08-13 RX ADMIN — OXYTOCIN-SODIUM CHLORIDE 0.9% IV SOLN 30 UNIT/500ML 999 ML/HR: 30-0.9/5 SOLUTION at 08:30

## 2024-08-13 NOTE — L&D DELIVERY NOTE
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 colpo: bx, ECC neg; pt needs LEEP.    White coat syndrome with diagnosis of hypertension    History of gestational hypertension- currently pregnant    Short interval between pregnancies affecting pregnancy, antepartum-  2023    Pregnancy    hx: Excessive fetal growth affecting management of pregnancy, antepartum    Macrosomia affecting management of mother in third trimester    Macrosomia             Problem List    Transfer to Postpartum     Review the Delivery Report for details.     Delivery     Delivery: , Low Transverse     YOB: 2023    Time of Birth:  Gestational Age 7:52 AM   39w0d     Anesthesia: Spinal     Delivering clinician: Nav Ruizing    Forceps?   No   Vacuum? No    Shoulder dystocia present: No        Delivery narrative:    PROCEDURE: Primary Low Transverse  SECTION     PREOP DIAGNOSIS:  Macrosomia     POSTOP DIAGNOSIS:  same     SURGEON:   Victoriano     ASSIST:  Terry, responsible for retracting, suturing, delivery of fetus, closing and placing dressing.     ANESTHESIA:  spinal     EBL:   1000cc     IVFS:  1200cc     UO:  75cc     COMPLICATIONS:  none     FINDINGS:  1 live, viable male, Apgars: 8, 9, wt = 10-0 @ 07:52     ANTIBIOTICS:  kefzol and zithromax              DESCRIPTION OF THE PROCEDURE:       The patient was taken to the OR and placed on the table in the dorsosupine position.  Adequate spinale anesthesia was ensured.      Pt was prepped and draped.  IV antibiotics were given, time out was done.     A Pfannenstiel incision was made with a  knife and carried down sharply till the fascia was encountered.  The fascia was scored in the midline and extended bilaterally.  The fascia was then dissected bluntly and sharply off the rectus muscle bellies in a superior and inferior direction. The muscles were divided in the midline and the preperitoneal tissue was picked up with hemostats, incised, the peritoneal cavity thus being entered.  This opening was extended bluntly.     A low transverse incision was made with a knife without developing the bladder flap and the amniotic cavity was entered.  There was clear amniotic fluid.  This opening was extended bluntly superiorly and inferiorly.     Pt was delivered of 1 live viable male from the cephalic position.  There was no nuchal cord.  Infant was completely delivered, bulb suctioned, cord doubly clamped and divided and infant handed to nurse in attendance.  Cord blood was drawn, placenta delivered manually, intact w/ 3VC and was sent to pathology.     The uterus was exteriorized out of the abdominal cavity and wiped clean with a lap.  The uterine incision was reapproximated with 0-PDS in a running, interlocking stitch till completely hemostatic.  Any bleeders were bovied or oversewn.  The uterus was returned to the abdominal cavity and it was irrigated with copious amounts of warm water.  The uterine incision was reinspected and found to be completely hemostatic. The bladder flap was closed with 0-vicryl in a running stitch.      Both tubes and ovaries were normal, and the rest of the abdominal cavity was palpably normal.  All instruments were removed. Before each level of closure, copious irrigation was carried out and hemostasis was ensured.      The urine was clear at the termination of the procedure.      The fascia was closed with 0 vicryl in a running stitch. The subcutaneous layer was closed by the assistant as was the skin.       Pt tolerated procedure well and went to the  in satis condition.  All  "sponge, instrument and needle counts were correct x 3 according to the operating room personnel.           Infant     Findings: male  infant     Infant observations: Weight: 4536 g (10 lb)   Length: 22  in  Observations/Comments:        Apgars: 8  @ 1 minute /    9  @ 5 minutes   Infant Name:      Placenta & Cord         Placenta delivered  Manual removal  at        Cord: 3 vessels  present.   Nuchal Cord?  no   Cord blood obtained: Yes    Cord gases obtained:  No    Cord gas results: Venous:  No results found for: \"PHCVEN\", \"BECVEN\"    Arterial:  No results found for: \"PHCART\", \"BECART\"     Repair     Episiotomy: None     No    Lacerations: No   Estimated Blood Loss:  1000cc     Quantitative Blood Loss:          Complications     none    Disposition     Mother to Mother Baby/Postpartum  in stable condition currently.  Baby to NBN  in stable condition currently.    Nav Pastrana MD  08/13/24  09:12 EDT        "

## 2024-08-13 NOTE — PLAN OF CARE
Problem: Adult Inpatient Plan of Care  Goal: Plan of Care Review  Outcome: Ongoing, Progressing  Flowsheets (Taken 8/13/2024 1616)  Progress: improving  Plan of Care Reviewed With: patient  Outcome Evaluation: VSS. pt reports intermittent n/v, zofran and phenergan given. pt reports pain controlled with scheduled toradol and tylenol. cm in place. output adequate. pt attempted to ambulated. plan to try again this evening. infant in nursery.  Goal: Patient-Specific Goal (Individualized)  Outcome: Ongoing, Progressing  Flowsheets (Taken 8/13/2024 1616)  Patient-Specific Goals (Include Timeframe): due to attempt to ambulate again  Individualized Care Needs: plans to breastfeed  Goal: Absence of Hospital-Acquired Illness or Injury  Outcome: Ongoing, Progressing  Intervention: Identify and Manage Fall Risk  Recent Flowsheet Documentation  Taken 8/13/2024 1604 by Kristin Arreola RN  Safety Promotion/Fall Prevention: safety round/check completed  Taken 8/13/2024 1041 by Kristin Arreola RN  Safety Promotion/Fall Prevention: safety round/check completed  Intervention: Prevent and Manage VTE (Venous Thromboembolism) Risk  Recent Flowsheet Documentation  Taken 8/13/2024 1604 by Kristin Arreola RN  Activity Management: (neausea and vomiting uncontrolled) unable to complete activity (see comments)  Taken 8/13/2024 1515 by Kristin Arreola, RN  Activity Management: sitting, edge of bed  Taken 8/13/2024 1041 by Kristin Arreola RN  Activity Management: bedrest  VTE Prevention/Management:   bilateral   sequential compression devices on  Taken 8/13/2024 0841 by Kristin Arreola RN  VTE Prevention/Management:   bilateral   sequential compression devices on  Goal: Optimal Comfort and Wellbeing  Outcome: Ongoing, Progressing  Intervention: Monitor Pain and Promote Comfort  Recent Flowsheet Documentation  Taken 8/13/2024 1526 by Kristin Arreola RN  Pain Management Interventions: see MAR  Taken 8/13/2024 1234  by Balducci, Kristin, RN  Pain Management Interventions: see MAR  Taken 2024 0855 by Kristin Arreola RN  Pain Management Interventions: see MAR  Intervention: Provide Person-Centered Care  Recent Flowsheet Documentation  Taken 2024 1041 by Kristin Arreola RN  Trust Relationship/Rapport:   choices provided   care explained   emotional support provided   empathic listening provided   questions answered   questions encouraged   reassurance provided   thoughts/feelings acknowledged  Goal: Readiness for Transition of Care  Outcome: Ongoing, Progressing     Problem: Bleeding ( Delivery)  Goal: Bleeding is Controlled  Outcome: Ongoing, Progressing     Problem: Change in Fetal Wellbeing ( Delivery)  Goal: Stable Fetal Wellbeing  Outcome: Ongoing, Progressing     Problem: Infection ( Delivery)  Goal: Absence of Infection Signs and Symptoms  Outcome: Ongoing, Progressing     Problem: Respiratory Compromise ( Delivery)  Goal: Effective Oxygenation and Ventilation  Outcome: Ongoing, Progressing     Problem: Adjustment to Role Transition (Postpartum  Delivery)  Goal: Successful Maternal Role Transition  Outcome: Ongoing, Progressing     Problem: Bleeding (Postpartum  Delivery)  Goal: Hemostasis  Outcome: Ongoing, Progressing     Problem: Infection (Postpartum  Delivery)  Goal: Absence of Infection Signs and Symptoms  Outcome: Ongoing, Progressing     Problem: Pain (Postpartum  Delivery)  Goal: Acceptable Pain Control  Outcome: Ongoing, Progressing  Intervention: Prevent or Manage Pain  Recent Flowsheet Documentation  Taken 2024 1526 by Kristin Arreola RN  Pain Management Interventions: see MAR  Taken 2024 1234 by Kristin Arreola RN  Pain Management Interventions: see MAR  Taken 2024 0855 by Kristin Arreola RN  Pain Management Interventions: see MAR     Problem: Postoperative Nausea and Vomiting (Postpartum   Delivery)  Goal: Nausea and Vomiting Relief  Outcome: Ongoing, Progressing  Intervention: Prevent or Manage Postoperative Nausea and Vomiting  Recent Flowsheet Documentation  Taken 2024 4624 by Kristin Arreola RN  Nausea/Vomiting Interventions: see MAR     Problem: Postoperative Urinary Retention (Postpartum  Delivery)  Goal: Effective Urinary Elimination  Outcome: Ongoing, Progressing   Goal Outcome Evaluation:  Plan of Care Reviewed With: patient        Progress: improving  Outcome Evaluation: VSS. pt reports intermittent n/v, zofran and phenergan given. pt reports pain controlled with scheduled toradol and tylenol. cm in place. output adequate. pt attempted to ambulated. plan to try again this evening. infant in nursery.

## 2024-08-13 NOTE — ANESTHESIA PREPROCEDURE EVALUATION
Anesthesia Evaluation     Patient summary reviewed   no history of anesthetic complications:   NPO Solid Status: > 8 hours  NPO Liquid Status: > 8 hours           Airway   Mallampati: II  TM distance: >3 FB  Neck ROM: full  No difficulty expected  Dental - normal exam     Pulmonary - normal exam   (+) asthma (sports induced),  Cardiovascular - normal exam    Rhythm: regular  Rate: normal    (+) hypertension (former gestation HTN)      Neuro/Psych- negative ROS  GI/Hepatic/Renal/Endo - negative ROS     Musculoskeletal (-) negative ROS    Abdominal    Substance History   (-) alcohol use, drug use     OB/GYN    (+) Pregnant        Other - negative ROS                   Anesthesia Plan    ASA 2     spinal       Anesthetic plan, risks, benefits, and alternatives have been provided, discussed and informed consent has been obtained with: patient and spouse/significant other.  Pre-procedure education provided  Use of blood products discussed with patient and spouse/significant other  Consented to blood products.    Plan discussed with CRNA.    CODE STATUS:    Level Of Support Discussed With: Patient  Code Status (Patient has no pulse and is not breathing): CPR (Attempt to Resuscitate)  Medical Interventions (Patient has pulse or is breathing): Full Support

## 2024-08-13 NOTE — OP NOTE
OPERATIVE REPORT 24    PROCEDURE: Primary Low Transverse  SECTION    PREOP DIAGNOSIS:  Macrosomia    POSTOP DIAGNOSIS:  same    SURGEON:   Victoriano    ASSIST:  Terry, responsible for retracting, suturing, delivery of fetus, closing and placing dressing.    ANESTHESIA:  spinal    EBL:   1000cc    IVFS:  1200cc    UO:  75cc    COMPLICATIONS:  none    FINDINGS:  1 live, viable male, Apgars: 8, 9, wt = 10-0 @ 07:52    ANTIBIOTICS:  kefzol and zithromax          DESCRIPTION OF THE PROCEDURE:      The patient was taken to the OR and placed on the table in the dorsosupine position.  Adequate spinale anesthesia was ensured.     Pt was prepped and draped.  IV antibiotics were given, time out was done.    A Pfannenstiel incision was made with a knife and carried down sharply till the fascia was encountered.  The fascia was scored in the midline and extended bilaterally.  The fascia was then dissected bluntly and sharply off the rectus muscle bellies in a superior and inferior direction. The muscles were divided in the midline and the preperitoneal tissue was picked up with hemostats, incised, the peritoneal cavity thus being entered.  This opening was extended bluntly.    A low transverse incision was made with a knife without developing the bladder flap and the amniotic cavity was entered.  There was clear amniotic fluid.  This opening was extended bluntly superiorly and inferiorly.    Pt was delivered of 1 live viable male from the cephalic position.  There was no nuchal cord.  Infant was completely delivered, bulb suctioned, cord doubly clamped and divided and infant handed to nurse in attendance.  Cord blood was drawn, placenta delivered manually, intact w/ 3VC and was sent to pathology.    The uterus was exteriorized out of the abdominal cavity and wiped clean with a lap.  The uterine incision was reapproximated with 0-PDS in a running, interlocking stitch till completely hemostatic.  Any bleeders were  bovied or oversewn.  The uterus was returned to the abdominal cavity and it was irrigated with copious amounts of warm water.  The uterine incision was reinspected and found to be completely hemostatic. The bladder flap was closed with 0-vicryl in a running stitch.     Both tubes and ovaries were normal, and the rest of the abdominal cavity was palpably normal.  All instruments were removed. Before each level of closure, copious irrigation was carried out and hemostasis was ensured.     The urine was clear at the termination of the procedure.     The fascia was closed with 0 vicryl in a running stitch. The subcutaneous layer was closed by the assistant as was the skin.      Pt tolerated procedure well and went to the  in satis condition.  All sponge, instrument and needle counts were correct x 3 according to the operating room personnel.      Nav Pastrana MD  09:04 EDT  08/13/24

## 2024-08-13 NOTE — ANESTHESIA POSTPROCEDURE EVALUATION
Patient: Suha Raines    Procedure Summary       Date: 24 Room / Location: [facility] LABOR DELIVERY  [facility] LABOR DELIVERY    Anesthesia Start: 723 Anesthesia Stop: 843    Procedure:  SECTION PRIMARY (Abdomen) Diagnosis:     Surgeons: Nav Pastrana MD Provider: Kaelyn Ann CRNA    Anesthesia Type: spinal ASA Status: 2            Anesthesia Type: spinal    Vitals  Vitals Value Taken Time   /75 24 1230   Temp 97.2 °F (36.2 °C) 24 0926   Pulse 73 24 1230   Resp 17 24 1124   SpO2 100 % 24 1124   Vitals shown include unfiled device data.        Post Anesthesia Care and Evaluation    Patient location during evaluation: bedside  Patient participation: complete - patient participated  Level of consciousness: awake and alert  Pain management: adequate    Airway patency: patent  Anesthetic complications: No anesthetic complications  PONV Status: none  Cardiovascular status: acceptable  Respiratory status: acceptable  Hydration status: acceptable

## 2024-08-13 NOTE — H&P
PREOPERATIVE HISTORY AND PHYSICAL      Patient Care Team:  Provider, No Known as PCP - Sravanthi Mann MD as Consulting Physician (Obstetrics and Gynecology)  Tayo Carrasco MD as Consulting Physician (Obstetrics and Gynecology)  Nav Pastrana MD as Consulting Physician (Obstetrics and Gynecology)  Noreen Taylor APRN as Nurse Practitioner (Obstetrics and Gynecology)    Chief complaint: Macrosomia affecting management of mother in third trimester    Pt is a 33 y.o.   Patient's last menstrual period was 10/30/2023 (exact date).     HPI:History of Present Illness  Pt here for Prim C/S for macrosomia determined by u/s done on :  U/s for growth:  Ceph, EFW >99% (4721g,10-7),   anteroleftlateral plac,   MELISSA = 18.9cm, +breathing and movement    Decision was made to proceed with a primary C/S for macrosomia to avoid potential birth injury.    Pt has pelvis proven to 8-13    Pt aware that u/s done late in pregnancy can have a range of + or - 1 # in EFW.        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 colpo: bx, ECC neg; pt needs LEEP.    White coat syndrome with diagnosis of hypertension    History of gestational hypertension- currently pregnant    Short interval between pregnancies affecting pregnancy, antepartum-  2023    Pregnancy    hx: Excessive fetal growth affecting management of pregnancy, antepartum       PSHx:   Past Surgical History:   Procedure Laterality Date    TONSILLECTOMY      WISDOM TOOTH EXTRACTION         Social Hx:   Social  History     Socioeconomic History    Marital status:    Tobacco Use    Smoking status: Never    Smokeless tobacco: Never   Vaping Use    Vaping status: Never Used   Substance and Sexual Activity    Alcohol use: Not Currently     Comment: casual    Drug use: Never    Sexual activity: Yes     Partners: Male       FHx:   Family History   Problem Relation Age of Onset    No Known Problems Mother     No Known Problems Father     Breast cancer Neg Hx     Ovarian cancer Neg Hx     Colon cancer Neg Hx     Uterine cancer Neg Hx        Debilities/Disabilities Identified: None    Emotional Behavior: Appropriate    PGyn Hx:  otherwise noncontributory    POBHx:   OB History    Para Term  AB Living   2 1 1 0 0 1   SAB IAB Ectopic Molar Multiple Live Births   0 0 0 0 0 1      # Outcome Date GA Lbr En/2nd Weight Sex Type Anes PTL Lv   2 Current            1 Term 23 39w4d / 01:18 4017 g (8 lb 13.7 oz) M Vag-Spont EPI, Local N DILEEP      Complications: Maternal fever affecting labor      Name: DEYVI TRISTAN      Apgar1: 7  Apgar5: 9       Allergies: Patient has no known allergies.    Medications:   No medications prior to admission.                            No current facility-administered medications for this encounter.    Current Outpatient Medications:     albuterol sulfate  (90 Base) MCG/ACT inhaler, Inhale 2 puffs Every 4 (Four) Hours As Needed for Wheezing., Disp: 6.7 g, Rfl: 0    aspirin 81 MG oral suspension, , Disp: , Rfl:     docusate sodium (Colace) 100 MG capsule, Take 1 capsule by mouth 2 (Two) Times a Day., Disp: 60 capsule, Rfl: 3    doxylamine (Unisom SleepTabs) 25 MG tablet, Take 1 tablet by mouth Every Night., Disp: 30 tablet, Rfl: 1    famotidine (PEPCID) 20 MG tablet, Take 1 tablet by mouth 2 (Two) Times a Day., Disp: 60 tablet, Rfl: 6    ferrous gluconate (FERGON) 324 MG tablet, Take 1 tablet by mouth Daily With Breakfast., Disp: 30 tablet, Rfl: 6    fexofenadine (ALLEGRA)  180 MG tablet, , Disp: , Rfl:     hydrOXYzine (ATARAX) 25 MG tablet, Take 1 tablet by mouth Every 6 (Six) Hours As Needed for Itching., Disp: 30 tablet, Rfl: 0    Magnesium 500 MG capsule, Daily., Disp: , Rfl:     Omega-3 Fatty Acids (fish oil) 1000 MG capsule capsule, Daily., Disp: , Rfl:     ondansetron (Zofran) 4 MG tablet, Take 1 tablet by mouth Every 8 (Eight) Hours As Needed for Nausea or Vomiting., Disp: 30 tablet, Rfl: 2    Prenatal Vit-Fe Fumarate-FA (prenatal vitamin 27-0.8) 27-0.8 MG tablet tablet, Take  by mouth Daily., Disp: , Rfl:     promethazine (PHENERGAN) 12.5 MG tablet, Take 1 tablet by mouth Every 6 (Six) Hours As Needed for Nausea or Vomiting., Disp: 30 tablet, Rfl: 1    vitamin B-12 (CYANOCOBALAMIN) 1000 MCG tablet, See Admin Instructions., Disp: , Rfl:     vitamin B-6 (PYRIDOXINE) 25 MG tablet, Take 1 tablet by mouth Every Night., Disp: 30 tablet, Rfl: 1    Zinc 30 MG capsule, Take  by mouth., Disp: , Rfl:         Review of Systems   Constitutional: Negative.    HENT: Negative.     Eyes: Negative.    Respiratory: Negative.     Cardiovascular: Negative.    Gastrointestinal: Negative.    Endocrine: Negative.    Musculoskeletal: Negative.    Skin: Negative.    Allergic/Immunologic: Negative.    Neurological: Negative.    Hematological: Negative.    Psychiatric/Behavioral: Negative.         Vital Signs  [facility] 10/30/2023 (Exact Date)     Physical Exam  Vitals and nursing note reviewed.   Constitutional:       Appearance: She is well-developed.   HENT:      Head: Normocephalic and atraumatic.   Cardiovascular:      Rate and Rhythm: Normal rate.   Pulmonary:      Effort: Pulmonary effort is normal.   Abdominal:      General: There is no distension.      Palpations: Abdomen is soft. There is no mass.      Tenderness: There is no abdominal tenderness. There is no guarding.   Genitourinary:     Vagina: No vaginal discharge.   Musculoskeletal:         General: No tenderness or deformity. Normal range of  motion.      Cervical back: Normal range of motion.   Skin:     General: Skin is warm and dry.      Coloration: Skin is not pale.      Findings: No erythema or rash.   Neurological:      Mental Status: She is alert and oriented to person, place, and time.   Psychiatric:         Behavior: Behavior normal.         Thought Content: Thought content normal.         Judgment: Judgment normal.             IMPRESSION:    Macrosomia affecting management of mother in third trimester                                    PLAN:    Procedure(s):   SECTION PRIMARY    RISKS, ALTERNATIVES, COMPLICATIONS OF THE PROCEDURE INCLUDING BUT NOT LIMITED TO:    INTRAOPERATIVE RISKS: INJURY TO INTERNAL AND ADJACENT ORGANS AND STRUCTURES (BOWEL, BLADDER, URETER,BLOOD VESSELS) OR HEMORRHAGE REQUIRING FURTHER SURGERY (LAPAROTOMY),  POSSIBLE NON-DIAGNOSTIC FINDINGS, DISCOVERY OF POSSIBLE MALIGNANCY, INFECTION, AND DEATH;   POSTOP COMPLICATIONS: BLEEDING, INFECTION (REQUIRING POSSIBLE REOPERATION), FAILURE OF GOAL OF SURGERY AND RECURRENCE OF ORIGINAL SYMPTOMS, PNEUMONIA, PULMONARY EMBOLISM, AND DEATH;  WERE EXPLAINED TO THE PT WHO VERBALIZED HER UNDERSTANDING.             I discussed the patients findings and my recommendations with patient and family.     Nav Pastrana MD  24  23:13 EDT

## 2024-08-13 NOTE — INTERVAL H&P NOTE
"H&P reviewed. The patient was examined and there are no changes to the H&P.    /88   Pulse 79   Temp 98.1 °F (36.7 °C) (Oral)   Resp 16   Ht 165.1 cm (65\")   Wt 101 kg (222 lb)   LMP 10/30/2023 (Exact Date)   Breastfeeding Yes   BMI 36.94 kg/m²     Medications Prior to Admission   Medication Sig Dispense Refill Last Dose    aspirin 81 MG oral suspension    Past Week    docusate sodium (Colace) 100 MG capsule Take 1 capsule by mouth 2 (Two) Times a Day. 60 capsule 3 Past Week    famotidine (PEPCID) 20 MG tablet Take 1 tablet by mouth 2 (Two) Times a Day. 60 tablet 6 8/12/2024    ferrous gluconate (FERGON) 324 MG tablet Take 1 tablet by mouth Daily With Breakfast. 30 tablet 6 Past Week    Magnesium 500 MG capsule Daily.   Past Week    Omega-3 Fatty Acids (fish oil) 1000 MG capsule capsule Daily.   Past Week    ondansetron (Zofran) 4 MG tablet Take 1 tablet by mouth Every 8 (Eight) Hours As Needed for Nausea or Vomiting. 30 tablet 2 8/12/2024    Prenatal Vit-Fe Fumarate-FA (prenatal vitamin 27-0.8) 27-0.8 MG tablet tablet Take  by mouth Daily.   Past Week    vitamin B-12 (CYANOCOBALAMIN) 1000 MCG tablet See Admin Instructions.   Past Week    vitamin B-6 (PYRIDOXINE) 25 MG tablet Take 1 tablet by mouth Every Night. 30 tablet 1 Past Week    Zinc 30 MG capsule Take  by mouth.   Past Week    albuterol sulfate  (90 Base) MCG/ACT inhaler Inhale 2 puffs Every 4 (Four) Hours As Needed for Wheezing. 6.7 g 0 More than a month    doxylamine (Unisom SleepTabs) 25 MG tablet Take 1 tablet by mouth Every Night. 30 tablet 1 More than a month    fexofenadine (ALLEGRA) 180 MG tablet    More than a month    hydrOXYzine (ATARAX) 25 MG tablet Take 1 tablet by mouth Every 6 (Six) Hours As Needed for Itching. 30 tablet 0 More than a month    promethazine (PHENERGAN) 12.5 MG tablet Take 1 tablet by mouth Every 6 (Six) Hours As Needed for Nausea or Vomiting. 30 tablet 1 More than a month     "

## 2024-08-13 NOTE — ANESTHESIA PROCEDURE NOTES
Spinal Block    Pre-sedation assessment completed: 8/13/2024 7:25 AM    Patient reassessed immediately prior to procedure    Patient location during procedure: OB  Start Time: 8/13/2024 7:28 AM  Stop Time: 8/13/2024 7:32 AM  Indication:at surgeon's request and post-op pain management  Performed By  CRNA/CAA: Kaelyn Ann, CRNA  Preanesthetic Checklist  Completed: patient identified, IV checked, site marked, risks and benefits discussed, surgical consent, monitors and equipment checked, pre-op evaluation and timeout performed  Spinal Block Prep:  Patient Position:sitting  Sterile Tech:cap, gloves, mask and sterile barriers  Prep:Chloraprep  Patient Monitoring:blood pressure monitoring, continuous pulse oximetry and EKG    Spinal Block Procedure  Approach:midline  Guidance:landmark technique and palpation technique  Location:L3-L4  Needle Type:Sprotte  Needle Gauge:25 G  Placement of Spinal needle event:cerebrospinal fluid aspirated  Paresthesia: left and transient (resolved with needle reposition)  Fluid Appearance:clear  Medications: bupivacaine PF (MARCAINE) injection 0.75% - Intrathecal   1.8 mL - 8/13/2024 7:32:00 AM   Post Assessment  Patient Tolerance:patient tolerated the procedure well with no apparent complications  Complications no

## 2024-08-14 LAB
BASOPHILS # BLD AUTO: 0.03 10*3/MM3 (ref 0–0.2)
BASOPHILS NFR BLD AUTO: 0.4 % (ref 0–1.5)
DEPRECATED RDW RBC AUTO: 47.4 FL (ref 37–54)
EOSINOPHIL # BLD AUTO: 0.11 10*3/MM3 (ref 0–0.4)
EOSINOPHIL NFR BLD AUTO: 1.3 % (ref 0.3–6.2)
ERYTHROCYTE [DISTWIDTH] IN BLOOD BY AUTOMATED COUNT: 13.2 % (ref 12.3–15.4)
HCT VFR BLD AUTO: 28.9 % (ref 34–46.6)
HGB BLD-MCNC: 10 G/DL (ref 12–15.9)
IMM GRANULOCYTES # BLD AUTO: 0.04 10*3/MM3 (ref 0–0.05)
IMM GRANULOCYTES NFR BLD AUTO: 0.5 % (ref 0–0.5)
LYMPHOCYTES # BLD AUTO: 2.23 10*3/MM3 (ref 0.7–3.1)
LYMPHOCYTES NFR BLD AUTO: 26.5 % (ref 19.6–45.3)
MCH RBC QN AUTO: 34.2 PG (ref 26.6–33)
MCHC RBC AUTO-ENTMCNC: 34.6 G/DL (ref 31.5–35.7)
MCV RBC AUTO: 99 FL (ref 79–97)
MONOCYTES # BLD AUTO: 0.86 10*3/MM3 (ref 0.1–0.9)
MONOCYTES NFR BLD AUTO: 10.2 % (ref 5–12)
NEUTROPHILS NFR BLD AUTO: 5.13 10*3/MM3 (ref 1.7–7)
NEUTROPHILS NFR BLD AUTO: 61.1 % (ref 42.7–76)
NRBC BLD AUTO-RTO: 0 /100 WBC (ref 0–0.2)
PLATELET # BLD AUTO: 182 10*3/MM3 (ref 140–450)
PMV BLD AUTO: 11.1 FL (ref 6–12)
RBC # BLD AUTO: 2.92 10*6/MM3 (ref 3.77–5.28)
WBC NRBC COR # BLD AUTO: 8.4 10*3/MM3 (ref 3.4–10.8)

## 2024-08-14 PROCEDURE — 85025 COMPLETE CBC W/AUTO DIFF WBC: CPT | Performed by: OBSTETRICS & GYNECOLOGY

## 2024-08-14 PROCEDURE — 25010000002 KETOROLAC TROMETHAMINE PER 15 MG: Performed by: OBSTETRICS & GYNECOLOGY

## 2024-08-14 PROCEDURE — 0503F POSTPARTUM CARE VISIT: CPT | Performed by: NURSE PRACTITIONER

## 2024-08-14 PROCEDURE — 63710000001 DIPHENHYDRAMINE PER 50 MG

## 2024-08-14 PROCEDURE — 25010000002 ENOXAPARIN PER 10 MG: Performed by: OBSTETRICS & GYNECOLOGY

## 2024-08-14 RX ORDER — FERROUS SULFATE 325(65) MG
325 TABLET ORAL 2 TIMES DAILY WITH MEALS
Status: DISCONTINUED | OUTPATIENT
Start: 2024-08-14 | End: 2024-08-15 | Stop reason: HOSPADM

## 2024-08-14 RX ADMIN — OXYCODONE HYDROCHLORIDE 5 MG: 5 TABLET ORAL at 19:28

## 2024-08-14 RX ADMIN — FAMOTIDINE 20 MG: 20 TABLET, FILM COATED ORAL at 09:39

## 2024-08-14 RX ADMIN — PRENATAL VIT W/ FE FUMARATE-FA TAB 27-0.8 MG 1 TABLET: 27-0.8 TAB at 09:39

## 2024-08-14 RX ADMIN — FERROUS SULFATE TAB 325 MG (65 MG ELEMENTAL FE) 325 MG: 325 (65 FE) TAB at 18:12

## 2024-08-14 RX ADMIN — KETOROLAC TROMETHAMINE 15 MG: 30 INJECTION, SOLUTION INTRAMUSCULAR; INTRAVENOUS at 03:08

## 2024-08-14 RX ADMIN — FERROUS SULFATE TAB 325 MG (65 MG ELEMENTAL FE) 325 MG: 325 (65 FE) TAB at 09:39

## 2024-08-14 RX ADMIN — ACETAMINOPHEN 650 MG: 325 TABLET ORAL at 06:29

## 2024-08-14 RX ADMIN — DIPHENHYDRAMINE HYDROCHLORIDE 25 MG: 25 CAPSULE ORAL at 01:09

## 2024-08-14 RX ADMIN — FAMOTIDINE 20 MG: 20 TABLET, FILM COATED ORAL at 18:13

## 2024-08-14 RX ADMIN — IBUPROFEN 600 MG: 600 TABLET, FILM COATED ORAL at 18:13

## 2024-08-14 RX ADMIN — OXYCODONE HYDROCHLORIDE 5 MG: 5 TABLET ORAL at 10:37

## 2024-08-14 RX ADMIN — ACETAMINOPHEN 650 MG: 325 TABLET ORAL at 19:29

## 2024-08-14 RX ADMIN — IBUPROFEN 600 MG: 600 TABLET, FILM COATED ORAL at 23:58

## 2024-08-14 RX ADMIN — DOCUSATE SODIUM 100 MG: 100 CAPSULE, LIQUID FILLED ORAL at 09:39

## 2024-08-14 RX ADMIN — ENOXAPARIN SODIUM 40 MG: 100 INJECTION SUBCUTANEOUS at 09:39

## 2024-08-14 RX ADMIN — IBUPROFEN 600 MG: 600 TABLET, FILM COATED ORAL at 09:39

## 2024-08-14 RX ADMIN — ACETAMINOPHEN 1000 MG: 500 TABLET ORAL at 00:21

## 2024-08-14 NOTE — NURSING NOTE
Pt left for therapeutic day pass to visit infant in NICU at [facility]. Pt agreed to return at 1700. Pt agreed to care instructions with no additional questions.

## 2024-08-14 NOTE — PLAN OF CARE
Goal Outcome Evaluation:  Plan of Care Reviewed With: patient        Progress: improving  Outcome Evaluation: VSS, pain well controlled with ERAS, adequate urine output, pt up to ambulate in hallway this shift, tolerated well. Receiving updates on  from spouse who is with infant at [facility] NICU. Pt is pumping.

## 2024-08-14 NOTE — PROGRESS NOTES
ANASTACIO Eid   PROGRESS NOTE    Post-Op Day 1 S/P   Subjective   Subjective  Patient reports:  Pain is well controlled with  IV toradol .  She has not  ambulated yet. Tolerating diet. Tolerating po -- normal for liquids and normal for solids.  Intake -- c/o of tolerating po solids and tolerating po liquids.   Voiding -  has not voided since F/C removal ; flatus reported..  Vaginal bleeding is as much as expected.    Objective    Objective     Vitals: Vital Signs Range for the last 24 hours  Temperature: Temp:  [97.2 °F (36.2 °C)-98.1 °F (36.7 °C)] 98.1 °F (36.7 °C)   Temp Source: Temp src: Oral   BP: BP: ()/(53-75) 108/64   Pulse: Heart Rate:  [54-84] 81   Respirations: Resp:  [14-18] 16   SPO2: SpO2:  [94 %-100 %] 98 %   O2 Amount (l/min):     O2 Devices Device (Oxygen Therapy): room air   Weight:              Physical Exam    Lungs clear to auscultation bilaterally   Abdomen Soft, fundus firm, bowel sounds present   Incision  Dressing C/D/I    Extremities edema trace and Homans sign is negative, no sign of DVT     I reviewed the patient's new clinical results.    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 colpo: bx, ECC neg; pt needs LEEP.    White coat syndrome with diagnosis of hypertension    History of gestational hypertension- currently pregnant    Short interval between pregnancies affecting pregnancy, antepartum-  2023    Pregnancy    hx: Excessive fetal growth affecting management of pregnancy, antepartum    Macrosomia affecting management of mother in third trimester    Macrosomia    Assessment & Plan    Assessment:    Suha Raines is Day 1  post-partum  , Low Transverse   .      Plan:   1) Postop day #1- S/P PLTCS. Rh +. Hgb 10.0g/dL .    2) Postpartum care- advance.     3) Postpartum anemia- Continue iron.     4) Male infant- Breast. In  NICU. Pt desires a pass to see  today.     5) Dispo- Consider home tomorrow.     Noreen Taylor, SUNDEEP  24  08:19 EDT

## 2024-08-14 NOTE — PROGRESS NOTES
Patient: Suha Raines  Procedure(s):   SECTION PRIMARY  Anesthesia type: spinal    Patient location: Labor and Delivery  Vitals:    24 2105 24 0020 24 0639 24 0749   BP: 124/70 103/62 106/57 108/64   BP Location: Left arm Left arm Left arm Left arm   Patient Position: Lying Lying Lying Sitting   Pulse: 67 64 58 81   Resp: 18 18 16 16   Temp: 98 °F (36.7 °C) 97.5 °F (36.4 °C) 98.1 °F (36.7 °C) 98.1 °F (36.7 °C)   TempSrc: Axillary Axillary Oral Oral   SpO2: 100%  98% 98%   Weight:       Height:         Level of consciousness: awake, alert, and oriented    Post-anesthesia pain: adequate analgesia  Airway patency: patent  Respiratory: unassisted  Cardiovascular: stable and blood pressure at baseline  Hydration: euvolemic    Anesthetic complications: no

## 2024-08-15 VITALS
WEIGHT: 222 LBS | BODY MASS INDEX: 36.99 KG/M2 | TEMPERATURE: 98 F | HEIGHT: 65 IN | SYSTOLIC BLOOD PRESSURE: 127 MMHG | HEART RATE: 81 BPM | RESPIRATION RATE: 18 BRPM | DIASTOLIC BLOOD PRESSURE: 88 MMHG | OXYGEN SATURATION: 99 %

## 2024-08-15 PROCEDURE — 25010000002 ENOXAPARIN PER 10 MG: Performed by: OBSTETRICS & GYNECOLOGY

## 2024-08-15 PROCEDURE — 0503F POSTPARTUM CARE VISIT: CPT | Performed by: NURSE PRACTITIONER

## 2024-08-15 RX ORDER — OXYCODONE HYDROCHLORIDE 5 MG/1
5 TABLET ORAL EVERY 4 HOURS PRN
Qty: 18 TABLET | Refills: 0 | Status: SHIPPED | OUTPATIENT
Start: 2024-08-15 | End: 2024-08-18

## 2024-08-15 RX ORDER — DOCUSATE SODIUM 100 MG/1
100 CAPSULE, LIQUID FILLED ORAL 2 TIMES DAILY
Qty: 60 CAPSULE | Refills: 3 | Status: SHIPPED | OUTPATIENT
Start: 2024-08-15

## 2024-08-15 RX ORDER — ACETAMINOPHEN 325 MG/1
650 TABLET ORAL EVERY 6 HOURS
Qty: 30 TABLET | Refills: 0 | Status: SHIPPED | OUTPATIENT
Start: 2024-08-15

## 2024-08-15 RX ORDER — IBUPROFEN 600 MG/1
600 TABLET ORAL EVERY 6 HOURS
Qty: 30 TABLET | Refills: 0 | Status: SHIPPED | OUTPATIENT
Start: 2024-08-15

## 2024-08-15 RX ADMIN — ACETAMINOPHEN 650 MG: 325 TABLET ORAL at 08:21

## 2024-08-15 RX ADMIN — ENOXAPARIN SODIUM 40 MG: 100 INJECTION SUBCUTANEOUS at 08:21

## 2024-08-15 RX ADMIN — FERROUS SULFATE TAB 325 MG (65 MG ELEMENTAL FE) 325 MG: 325 (65 FE) TAB at 08:21

## 2024-08-15 RX ADMIN — ACETAMINOPHEN 650 MG: 325 TABLET ORAL at 02:55

## 2024-08-15 RX ADMIN — FAMOTIDINE 20 MG: 20 TABLET, FILM COATED ORAL at 08:21

## 2024-08-15 RX ADMIN — PRENATAL VIT W/ FE FUMARATE-FA TAB 27-0.8 MG 1 TABLET: 27-0.8 TAB at 08:21

## 2024-08-15 RX ADMIN — IBUPROFEN 600 MG: 600 TABLET, FILM COATED ORAL at 06:36

## 2024-08-15 RX ADMIN — DOCUSATE SODIUM 100 MG: 100 CAPSULE, LIQUID FILLED ORAL at 08:21

## 2024-08-15 NOTE — DISCHARGE SUMMARY
"Obstetrical Discharge Form    Primary OB Clinician: BIMAL    Gestational Age: 39.0     Antepartum complications: H/O abnormal pap, h/o GHTN, short interval between pregnancies, macrosomia    Date of Delivery:  2024     Delivered By: Nav Pastrana     Delivery Type: primary  section, low transverse incision    Tubal Ligation: n/a    Baby: Liveborn male, Apgars 8/9, weight 10 #, 0 oz,     Anesthesia: spinal    Intrapartum complications: None    Laceration: n/a    Feeding method: breast    Discharge Date: 8/15/2024; Discharge Time: 08:53 EDT    /80 (BP Location: Left arm, Patient Position: Lying)   Pulse 83   Temp 98.5 °F (36.9 °C) (Axillary)   Resp 17   Ht 165.1 cm (65\")   Wt 101 kg (222 lb)   LMP 10/30/2023 (Exact Date)   SpO2 98%   Breastfeeding Yes   BMI 36.94 kg/m²      Abd: soft, fundus firm, bowel sounds present. Incision C/D/I  Ext: trace edema, neg Yarely's sign  Results from last 7 days   Lab Units 24  0639   HEMOGLOBIN g/dL 10.0*       Impression: 1) Postpartum day #2- S/P PTLCS. Rh +.    2) Postpartum anemia- Continue iron    3) Male infant- breast. Infant in NICU.         Plan:    Patient given written instruction sheet.  Follow-up appointment with [facility] in 2 weeks.    SUNDEEP Dutton  08:53 EDT  8/15/2024    Discharge time was less than 30 minutes  "

## 2024-08-15 NOTE — NURSING NOTE
D/c instructions discussed w/ pt - she verbalized understanding and all questions answered.  Pt will call office to schedule pp visit.  She is aware of s/s to call MD or go to ER.  Pt denies any needs or concerns at this time.

## 2024-08-15 NOTE — PLAN OF CARE
Goal Outcome Evaluation:              Outcome Evaluation: VSS; pain well controlled; pt wants to go home to go visit her baby in the NICU; Pt up ad richa; Pt is pumping; Up to bathroom on own;

## 2024-08-15 NOTE — CASE MANAGEMENT/SOCIAL WORK
Case Management Discharge Note      Final Note: Discharged home.         Selected Continued Care - Discharged on 8/15/2024 Admission date: 8/13/2024 - Discharge disposition: Home or Self Care      Destination    No services have been selected for the patient.                Durable Medical Equipment    No services have been selected for the patient.                Dialysis/Infusion    No services have been selected for the patient.                Home Medical Care    No services have been selected for the patient.                Therapy    No services have been selected for the patient.                Community Resources    No services have been selected for the patient.                Community & DME    No services have been selected for the patient.                         Final Discharge Disposition Code: 01 - home or self-care

## 2024-08-19 LAB
LAB AP CASE REPORT: NORMAL
PATH REPORT.FINAL DX SPEC: NORMAL

## 2024-08-28 ENCOUNTER — MATERNAL SCREENING (OUTPATIENT)
Dept: CALL CENTER | Facility: HOSPITAL | Age: 34
End: 2024-08-28
Payer: COMMERCIAL

## 2024-08-28 ENCOUNTER — POSTPARTUM VISIT (OUTPATIENT)
Dept: OBSTETRICS AND GYNECOLOGY | Facility: CLINIC | Age: 34
End: 2024-08-28
Payer: COMMERCIAL

## 2024-08-28 VITALS
WEIGHT: 196 LBS | SYSTOLIC BLOOD PRESSURE: 112 MMHG | HEIGHT: 65 IN | BODY MASS INDEX: 32.65 KG/M2 | DIASTOLIC BLOOD PRESSURE: 88 MMHG

## 2024-08-28 DIAGNOSIS — Z13.89 SCREENING FOR GENITOURINARY CONDITION: Primary | ICD-10-CM

## 2024-08-28 NOTE — PROGRESS NOTES
"FINAL POSTPARTUM VISIT: , Low Transverse ; gender: male    S:  .  PP depression symptoms or concerns?  no.      Breast or bottle feeding?  breast.      Hx GDM? no, Hx HTN? no.  If \"yes\" to either, pt counseled that these disorders are associated   With a higher lifetime risk of cardiometabolic disease and that follow up with their PCP for ongoing   Care is vital.     Any shortness of breath or persistent lower extremity swelling?  no    Hx  birth? no    O:  Ht 165.1 cm (65\")   Wt 88.9 kg (196 lb)   LMP 10/30/2023 (Exact Date)   Breastfeeding Yes   BMI 32.62 kg/m²     Exam: inc looks good    A:  doing well postop C/S      * No active hospital problems. *      Smoker? no    P:  RTO 4 weeks.    Contraception needed?  condoms    Pt instructed to incorporate pelvic floor exercises for NAHEED.    Nav Pastrana MD  14:51 EDT  24    "

## 2024-08-28 NOTE — OUTREACH NOTE
Maternal Screening Survey      Flowsheet Row Responses   Facility patient discharged from? [facility]   Attempt successful? No   Unsuccessful attempts Attempt 2              GRACE WEBSTER - Registered Nurse

## 2024-08-28 NOTE — OUTREACH NOTE
Maternal Screening Survey      Flowsheet Row Responses   Facility patient discharged from? [facility]   Attempt successful? No   Unsuccessful attempts Attempt 1  [left msg  on vm]              GRACE WEBSTER - Registered Nurse

## 2024-08-29 ENCOUNTER — MATERNAL SCREENING (OUTPATIENT)
Dept: CALL CENTER | Facility: HOSPITAL | Age: 34
End: 2024-08-29
Payer: COMMERCIAL

## 2024-08-29 NOTE — OUTREACH NOTE
Maternal Screening Survey      Flowsheet Row Responses   Facility patient discharged from? [facility]   Attempt successful? No   Unsuccessful attempts Attempt 3   Revoke Decline to participate              Mykel NO - Registered Nurse

## 2024-10-01 ENCOUNTER — POSTPARTUM VISIT (OUTPATIENT)
Dept: OBSTETRICS AND GYNECOLOGY | Facility: CLINIC | Age: 34
End: 2024-10-01
Payer: COMMERCIAL

## 2024-10-01 VITALS
BODY MASS INDEX: 33.26 KG/M2 | WEIGHT: 199.6 LBS | HEIGHT: 65 IN | DIASTOLIC BLOOD PRESSURE: 70 MMHG | SYSTOLIC BLOOD PRESSURE: 116 MMHG

## 2024-10-01 PROCEDURE — 81025 URINE PREGNANCY TEST: CPT | Performed by: OBSTETRICS & GYNECOLOGY

## 2024-10-01 PROCEDURE — 99213 OFFICE O/P EST LOW 20 MIN: CPT | Performed by: OBSTETRICS & GYNECOLOGY

## 2024-10-29 ENCOUNTER — TELEPHONE (OUTPATIENT)
Dept: OBSTETRICS AND GYNECOLOGY | Facility: CLINIC | Age: 34
End: 2024-10-29

## 2024-10-29 NOTE — TELEPHONE ENCOUNTER
Caller: Suha Raines    Relationship: Self    Best call back number: [phone]    What is the best time to reach you: ASAP    Who are you requesting to speak with (clinical staff, provider,  specific staff member):     DR GAN OR NURSE    What was the call regarding:     PT HAD C SECTION ON 8/13/2024 AND BELIEVES IT IS INFECTED    RED, SWOLLEN, LUMP AND PAINFUL ON THE RIGHT SIDE    PLEASE CALL ASAP

## 2024-10-30 ENCOUNTER — TELEPHONE (OUTPATIENT)
Dept: OBSTETRICS AND GYNECOLOGY | Facility: CLINIC | Age: 34
End: 2024-10-30

## 2024-10-30 ENCOUNTER — OFFICE VISIT (OUTPATIENT)
Dept: OBSTETRICS AND GYNECOLOGY | Facility: CLINIC | Age: 34
End: 2024-10-30
Payer: COMMERCIAL

## 2024-10-30 ENCOUNTER — HOSPITAL ENCOUNTER (EMERGENCY)
Facility: HOSPITAL | Age: 34
Discharge: HOME | End: 2024-10-30
Payer: COMMERCIAL

## 2024-10-30 VITALS
SYSTOLIC BLOOD PRESSURE: 110 MMHG | DIASTOLIC BLOOD PRESSURE: 72 MMHG | HEIGHT: 65 IN | WEIGHT: 198 LBS | BODY MASS INDEX: 32.99 KG/M2

## 2024-10-30 VITALS
HEART RATE: 57 BPM | HEIGHT: 65 IN | OXYGEN SATURATION: 98 % | BODY MASS INDEX: 32.49 KG/M2 | DIASTOLIC BLOOD PRESSURE: 70 MMHG | TEMPERATURE: 98.3 F | RESPIRATION RATE: 16 BRPM | WEIGHT: 195 LBS | SYSTOLIC BLOOD PRESSURE: 91 MMHG

## 2024-10-30 DIAGNOSIS — Z51.89 VISIT FOR WOUND CHECK: Primary | ICD-10-CM

## 2024-10-30 DIAGNOSIS — Z13.89 SCREENING FOR GENITOURINARY CONDITION: Primary | ICD-10-CM

## 2024-10-30 PROCEDURE — 99282 EMERGENCY DEPT VISIT SF MDM: CPT

## 2024-10-30 RX ORDER — MEDROXYPROGESTERONE ACETATE 10 MG
10 TABLET ORAL DAILY
Qty: 10 TABLET | Refills: 0 | Status: SHIPPED | OUTPATIENT
Start: 2024-10-30

## 2024-10-30 RX ORDER — CEPHALEXIN 500 MG/1
500 CAPSULE ORAL 3 TIMES DAILY
Qty: 21 CAPSULE | Refills: 0 | Status: SHIPPED | OUTPATIENT
Start: 2024-10-30 | End: 2024-11-06

## 2024-10-30 RX ORDER — GINSENG 100 MG
1 CAPSULE ORAL 2 TIMES DAILY
Qty: 14.2 G | Refills: 0 | Status: SHIPPED | OUTPATIENT
Start: 2024-10-30 | End: 2024-11-06

## 2024-10-30 NOTE — TELEPHONE ENCOUNTER
Pt called and stated she come in today for exam. Pt states she got home and noticed she had some stinky pus coming out of her incision. Pt states she  in concerned and going to urgent care to have incision looked at. Advised pt to come in for appt tomorrow if not better.

## 2024-10-30 NOTE — ED PROVIDER NOTES
Subjective   History of Present Illness    33yo female w/ Phx HSV1, HGSIL, whitecoat hypertension, gestational hypertension, presenting to the ED for evaluation of wound check-     Review of Systems    ROS as specified in HPI.    Past Medical History:   Diagnosis Date    Abdominal cramps     Abnormal Pap smear of cervix     Asthma     Gestational hypertension, antepartum 2023    Hyperemesis gravidarum 2024    Pregnancy 2024    Varicella     Weight loss        No Known Allergies    Past Surgical History:   Procedure Laterality Date     SECTION N/A 2024    Procedure:  SECTION PRIMARY;  Surgeon: Nav Pastrana MD;  Location: [facility] LABOR DELIVERY;  Service: Obstetrics/Gynecology;  Laterality: N/A;    TONSILLECTOMY      WISDOM TOOTH EXTRACTION         Family History   Problem Relation Age of Onset    No Known Problems Mother     No Known Problems Father     Breast cancer Neg Hx     Ovarian cancer Neg Hx     Colon cancer Neg Hx     Uterine cancer Neg Hx        Social History     Socioeconomic History    Marital status:    Tobacco Use    Smoking status: Never    Smokeless tobacco: Never   Vaping Use    Vaping status: Never Used   Substance and Sexual Activity    Alcohol use: Not Currently     Comment: casual    Drug use: Never    Sexual activity: Yes     Partners: Male           Objective   Physical Exam    Procedures           ED Course                                               MDM    Final diagnoses:   None       ED Disposition  ED Disposition       None            No follow-up provider specified.       Medication List      No changes were made to your prescriptions during this visit.         ED Course: ***.    EKG: ***    Consults: ***    Incidental Findings: ***

## 2024-10-30 NOTE — DISCHARGE INSTRUCTIONS
Return to the ER with fever, spreading of the red area, purulent discharge, increased pain.  Keep the area clean and dry with mild soap and water and apply the antibiotic ointment to the wounds twice a day for the next 7 days.  Follow-up with Dr. Pastrana in 7 days time for repeat wound check.

## 2024-10-30 NOTE — PROGRESS NOTES
"EVALUATION AND MANAGEMENT ENCOUNTER    S:  Chief Complaint   Patient presents with    Postpartum Care     Inc check       HPI:  Suha Raines is a 34 y.o.  with No LMP recorded (lmp unknown). here for possible infected Csection incision from months ago.  Pt denies discharge, drainage.  Pt states she had a fever at home.    Review of Systems   Constitutional: Negative.    HENT: Negative.     Eyes: Negative.    Respiratory: Negative.     Cardiovascular: Negative.    Gastrointestinal: Negative.    Endocrine: Negative.    Musculoskeletal: Negative.    Skin: Negative.    Allergic/Immunologic: Negative.    Neurological: Negative.    Hematological: Negative.    Psychiatric/Behavioral: Negative.     :    .CESSATIONOPT    Vital Signs: /72   Ht 165.1 cm (65\")   Wt 89.8 kg (198 lb)   LMP  (LMP Unknown)   Breastfeeding Yes   BMI 32.95 kg/m²      Brief Urine Lab Results  (Last result in the past 365 days)        Color   Clarity   Blood   Leuk Est   Nitrite   Protein   CREAT   Urine HCG        10/01/24 1111               Negative       10/01/24 1111 Yellow   Clear   Negative   Negative   Negative   Negative                   Physical Exam  Vitals and nursing note reviewed.   Constitutional:       Appearance: She is well-developed.   HENT:      Head: Normocephalic and atraumatic.   Cardiovascular:      Rate and Rhythm: Normal rate.   Pulmonary:      Effort: Pulmonary effort is normal.   Abdominal:      General: Bowel sounds are normal. There is no distension.      Palpations: Abdomen is soft. There is no mass.      Tenderness: There is no abdominal tenderness. There is no guarding or rebound.      Hernia: No hernia is present.          Comments: Incision clean dry and intact, but with small hematoma-appearing area as indicated.  No erythema or induration or drainage.  Appeared as though endometriotic implant.   Genitourinary:     Vagina: No vaginal discharge.   Musculoskeletal:         General: No tenderness or " deformity. Normal range of motion.      Cervical back: Normal range of motion.   Skin:     General: Skin is warm and dry.      Coloration: Skin is not pale.      Findings: No erythema or rash.   Neurological:      Mental Status: She is alert and oriented to person, place, and time.   Psychiatric:         Behavior: Behavior normal.         Thought Content: Thought content normal.         Judgment: Judgment normal.             IMPRESSION:      Endometriosis of incision, doubt cellulitis    Diagnoses and all orders for this visit:    1. Screening for genitourinary condition (Primary)  -     Cancel: POC Urinalysis Dipstick  -     Cancel: POC Pregnancy, Urine    Other orders  -     medroxyPROGESTERone (Provera) 10 MG tablet; Take 1 tablet by mouth Daily. For 10 days  Dispense: 10 tablet; Refill: 0          * No active hospital problems. *          PLAN:      Provera for suppression; 10mg/day for 10 days.  Call if no resolution by early next week.     Pt instructed to call for results of any testing done today if she does not hear from us, and that failure to do so could result in inadequate treatment . Pt verbalized her understanding.     No follow-ups on file..  Instructions and precautions given.     Time Spent: I spent 20+ minutes caring for Suha on this date of service. This time includes time spent by me in the following activities: preparing for the visit, reviewing tests, obtaining and/or reviewing a separately obtained history, performing a medically appropriate examination and/or evaluation, counseling and educating the patient/family/caregiver, ordering medications, tests, or procedures, referring and communicating with other health care professionals, documenting information in the medical record, independently interpreting results and communicating that information with the patient/family/caregiver, and care coordination.      Nav Pastrana MD  11:06 EDT   10/30/24

## 2024-10-30 NOTE — ED TRIAGE NOTES
"Had C section in August incision had been healing well had some discomfort develop with redness in one spot went into clinic today was advised to be seen if worsened.  Pt reports a spot \"opened up and bloody puss came out\".    There is no drainage upon arrival to ER there eis a small area where incisional line has superficially  slightly   "

## 2024-10-30 NOTE — ED PROVIDER NOTES
EMERGENCY DEPARTMENT ENCOUNTER    Room Number:    PCP: Provider, No Known    HPI:  Chief Complaint: Wound check  A complete HPI/ROS/PMH/PSH/SH/FH are unobtainable due to: Nothing   Context: Suha Raines is a 34 y.o. female who presents to the ED c/o acute wound infection.  Patient reports  2024 that was performed by Dr. Roe George.  Over the past few weeks she states part of the incision has become red.  She was seen in the office by Dr. Pastrana today as she was concerned that her incision had become infected.  She was diagnosed with incisional endometriosis.  She was placed on 10 mg a day for 10 days.  Patient reports she got home this evening and there was a small area on the left side of the right side of the incision that ruptured and began to drain pus.  She decided to present to the ED for reevaluation.  No associated fever or chills.  No abdominal pain.      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 colpo: bx, ECC neg; pt needs LEEP. 10/26/2022    White coat syndrome with diagnosis of hypertension 2023    History of gestational hypertension- currently pregnant 01/15/2024    Short interval between pregnancies affecting pregnancy, antepartum-  2023 01/15/2024    Pregnancy 2024    hx: Excessive fetal growth affecting management of pregnancy, antepartum 2024    Macrosomia affecting management of mother in third trimester 2024    Macrosomia 2024     delivery delivered 2024     Resolved Ambulatory Problems     Diagnosis Date Noted    Well woman exam with routine gynecological exam 2021    IUD check up 2021    History of abnormal cervical Papanicolaou smear 2021    Cervical smear, as part of routine gynecological examination 2021    Routine  gynecological examination 2021    Initial obstetric visit in first trimester 2022     (ASC-H)- pt declines colpo 2022    Pregnancy 2022    Borderline high blood pressure 2022    Gestational hypertension, antepartum 2023    Encounter for planned induction of labor 2023    Normal vaginal delivery 2023    Acute mastitis 02/15/2023    Amenorrhea 2023    Pregnancy confirmed by positive urine test 2023    Empty gestational sac with ongoing pregnancy 2023    Early stage of pregnancy 2024    Constipation 01/15/2024    Hyperemesis gravidarum 2024    Prenatal care in second trimester 2024    Proteinuria affecting pregnancy in second trimester: mild 2024     Past Medical History:   Diagnosis Date    Abdominal cramps     Abnormal Pap smear of cervix     Asthma     Varicella     Weight loss          PAST SURGICAL HISTORY  Past Surgical History:   Procedure Laterality Date     SECTION N/A 2024    Procedure:  SECTION PRIMARY;  Surgeon: Nav Pastrana MD;  Location:  [facility]IV[facility];  Service: Obstetrics/Gynecology;  Laterality: N/A;    TONSILLECTOMY      WISDOM TOOTH EXTRACTION           FAMILY HISTORY  Family History   Problem Relation Age of Onset    No Known Problems Mother     No Known Problems Father     Breast cancer Neg Hx     Ovarian cancer Neg Hx     Colon cancer Neg Hx     Uterine cancer Neg Hx          SOCIAL HISTORY  Social History     Socioeconomic History    Marital status:    Tobacco Use    Smoking status: Never    Smokeless tobacco: Never   Vaping Use    Vaping status: Never Used   Substance and Sexual Activity    Alcohol use: Not Currently     Comment: casual    Drug use: Never    Sexual activity: Yes     Partners: Male         ALLERGIES  Patient has no known allergies.        REVIEW OF SYSTEMS  Review of Systems     Included in HPI  All systems reviewed and negative except for  those discussed in HPI.       PHYSICAL EXAM  ED Triage Vitals [10/30/24 1713]   Temp Heart Rate Resp BP SpO2   98.3 °F (36.8 °C) 57 16 130/92 99 %      Temp src Heart Rate Source Patient Position BP Location FiO2 (%)   Oral Monitor Lying Right arm --       Physical Exam      GENERAL: WDWN female, no acute distress  HENT: nares patent  EYES: no scleral icterus  CV: regular rhythm, normal rate  RESPIRATORY: normal effort  ABDOMEN: soft  MUSCULOSKELETAL: no deformity  NEURO: alert, moves all extremities, follows commands  PSYCH:  calm, cooperative  SKIN: Well-healing well-approximated incision site.  No obvious palpable seroma, hematoma, abscess.  There is a small area roughly a centimeter in length at approximately the right third of the incision that appears to be very superficially open.  There is a surrounding serous colored fluid.  No purulent drainage or definite signs of infection.    Vital signs and nursing notes reviewed.          LAB RESULTS  No results found for this or any previous visit (from the past 24 hours).    Ordered the above labs and reviewed the results.        RADIOLOGY  No Radiology Exams Resulted Within Past 24 Hours    Ordered the above noted radiological studies. Reviewed by me in PACS.        MEDICATIONS GIVEN IN ER  Medications - No data to display      ORDERS PLACED DURING THIS VISIT:  No orders of the defined types were placed in this encounter.        OUTPATIENT MEDICATION MANAGEMENT:  No current Epic-ordered facility-administered medications on file.     Current Outpatient Medications Ordered in Epic   Medication Sig Dispense Refill    albuterol sulfate  (90 Base) MCG/ACT inhaler Inhale 2 puffs Every 4 (Four) Hours As Needed for Wheezing. 6.7 g 0    aspirin 81 MG oral suspension       bacitracin 500 UNIT/GM ointment Apply 1 Application topically to the appropriate area as directed 2 (Two) Times a Day for 7 days. 14.2 g 0    cephalexin (KEFLEX) 500 MG capsule Take 1 capsule by  mouth 3 (Three) Times a Day for 7 days. 21 capsule 0    docusate sodium (Colace) 100 MG capsule Take 1 capsule by mouth 2 (Two) Times a Day. 60 capsule 3    fexofenadine (ALLEGRA) 180 MG tablet       ibuprofen (ADVIL,MOTRIN) 600 MG tablet Take 1 tablet by mouth Every 6 (Six) Hours. 30 tablet 0    Magnesium 500 MG capsule Daily.      medroxyPROGESTERone (Provera) 10 MG tablet Take 1 tablet by mouth Daily. For 10 days 10 tablet 0    Omega-3 Fatty Acids (fish oil) 1000 MG capsule capsule Daily.      Prenatal Vit-Fe Fumarate-FA (prenatal vitamin 27-0.8) 27-0.8 MG tablet tablet Take  by mouth Daily.      vitamin B-12 (CYANOCOBALAMIN) 1000 MCG tablet See Admin Instructions.         PROCEDURES  Procedures          MEDICAL DECISION MAKING, PROGRESS, and CONSULTS    Discussion below represents my analysis of pertinent findings related to patient's condition, differential diagnosis, treatment plan and final disposition.          Differential diagnosis:    DDx: Incisional endometriosis, cervical, less likely less likely early cellulitis.  Discussed watchful waiting with the patient and continuing with the medications that her OB/GYN have prescribed with follow-up in a week's time.  Patient states there was definitely a purulent drainage from the wound this evening.  She seems convinced of an early infection.  Given I cannot completely rule it out or say that this will lead to an infection I will go ahead and cover with 7 days of Keflex.  Will have her to continue with her primary as previously directed by her GYN.             Independent interpretation of labs, radiology studies, and discussions with consultants:  ED Course as of 10/30/24 1754   Wed Oct 30, 2024   1737 BP: 130/92 [RC]   1737 Noninvasive MAP (mmHg): 103 [RC]   1737 Heart Rate: 57 [RC]   1737 Resp: 16 [RC]   1737 SpO2: 99 %  RA [RC]      ED Course User Index  [RC] Donavan Mckeon III, PA             DIAGNOSIS  Final diagnoses:   Visit for wound check          DISPOSITION  Discharge      Latest Documented Vital Signs:  As of 17:54 EDT  BP- 91/70 HR- 57 Temp- 98.3 °F (36.8 °C) (Oral) O2 sat- 98%      --    Please note that portions of this were completed with a voice recognition program.       Note Disclaimer: At [facility], we believe that sharing information builds trust and better relationships. You are receiving this note because you are receiving care at [facility] or recently visited. It is possible you will see health information before a provider has talked with you about it. This kind of information can be easy to misunderstand. To help you fully understand what it means for your health, we urge you to discuss this note with your provider.         Donavan Mckeon III, PA  10/30/24 1703

## 2024-11-06 ENCOUNTER — POSTPARTUM VISIT (OUTPATIENT)
Dept: OBSTETRICS AND GYNECOLOGY | Facility: CLINIC | Age: 34
End: 2024-11-06
Payer: COMMERCIAL

## 2024-11-06 VITALS
HEIGHT: 65 IN | BODY MASS INDEX: 33.32 KG/M2 | DIASTOLIC BLOOD PRESSURE: 80 MMHG | SYSTOLIC BLOOD PRESSURE: 132 MMHG | WEIGHT: 200 LBS

## 2024-11-06 DIAGNOSIS — T81.89XA PROBLEM INVOLVING SURGICAL INCISION: ICD-10-CM

## 2024-11-06 NOTE — PROGRESS NOTES
POSTOP VISIT    Patient Care Team:  Provider, No Known as PCP - General  Sravanthi Obando MD as Consulting Physician (Obstetrics and Gynecology)  Tayo Carrasco MD as Consulting Physician (Obstetrics and Gynecology)  Nav Pastrana MD as Consulting Physician (Obstetrics and Gynecology)  Noreen Taylor APRN as Nurse Practitioner (Obstetrics and Gynecology)  -----------------------------------------------------HISTORY---------------------------------------------------    Chief Complaint: Pt here for postop check      34 y.o.  No LMP recorded (lmp unknown).    HPI:  Pt here for postop check for procedure:  Section Primary completed on date: 2024  Pt reports complaints: inc healing well now.  Tolerating diet well, normal bladder and bowel function, incision/s healing well.  Vaginal bleeding? no      PAST HISTORY REVIEWED:  1.   Past Surgical History:   Procedure Laterality Date     SECTION N/A 2024    Procedure:  SECTION PRIMARY;  Surgeon: Nav Pastrana MD;  Location: [facility] LABOR DELIVERY;  Service: Obstetrics/Gynecology;  Laterality: N/A;    TONSILLECTOMY      WISDOM TOOTH EXTRACTION        2.   Current Outpatient Medications:     albuterol sulfate  (90 Base) MCG/ACT inhaler, Inhale 2 puffs Every 4 (Four) Hours As Needed for Wheezing., Disp: 6.7 g, Rfl: 0    aspirin 81 MG oral suspension, , Disp: , Rfl:     docusate sodium (Colace) 100 MG capsule, Take 1 capsule by mouth 2 (Two) Times a Day., Disp: 60 capsule, Rfl: 3    fexofenadine (ALLEGRA) 180 MG tablet, , Disp: , Rfl:     ibuprofen (ADVIL,MOTRIN) 600 MG tablet, Take 1 tablet by mouth Every 6 (Six) Hours., Disp: 30 tablet, Rfl: 0    Magnesium 500 MG capsule, Daily., Disp: , Rfl:     medroxyPROGESTERone (Provera) 10 MG tablet, Take 1 tablet by mouth Daily. For 10 days, Disp: 10 tablet, Rfl: 0    Omega-3 Fatty Acids (fish oil) 1000 MG capsule capsule, Daily., Disp: ,  "Rfl:     Prenatal Vit-Fe Fumarate-FA (prenatal vitamin 27-0.8) 27-0.8 MG tablet tablet, Take  by mouth Daily., Disp: , Rfl:     vitamin B-12 (CYANOCOBALAMIN) 1000 MCG tablet, See Admin Instructions., Disp: , Rfl:   3. No Known Allergies    ROS:  Review of Systems   Constitutional: Negative.    Respiratory: Negative.     Cardiovascular: Negative.    Gastrointestinal: Negative.    Genitourinary: Negative.    Neurological: Negative.    Psychiatric/Behavioral: Negative.     :    -----------------------------------------------PHYSICAL EXAM----------------------------------------------    Vital Signs: /80   Ht 165.1 cm (65\")   Wt 90.7 kg (200 lb)   LMP  (LMP Unknown)   BMI 33.28 kg/m²      Physical Exam  Vitals and nursing note reviewed.   Constitutional:       Appearance: She is well-developed.   HENT:      Head: Normocephalic and atraumatic.   Pulmonary:      Effort: Pulmonary effort is normal.   Abdominal:      General: Bowel sounds are normal. There is no distension.      Palpations: Abdomen is soft. There is no mass.      Tenderness: There is no abdominal tenderness. There is no guarding or rebound.      Hernia: No hernia is present.      Comments: Incision clean dry and intact   Musculoskeletal:         General: Normal range of motion.      Cervical back: Normal range of motion.   Skin:     General: Skin is warm and dry.   Neurological:      Mental Status: She is alert and oriented to person, place, and time.   Psychiatric:         Behavior: Behavior normal.         Thought Content: Thought content normal.         Judgment: Judgment normal.         -----------------------------------------------MEDICAL DECISION MAKING-----------------------------      DATA Review & labs ordered:        Path report reviewed? yes    IMPRESSION & DIAGNOSIS:      Incision healing well      PLAN:     RTO  as scheduled for colposcopy         Nav Pastrana MD  14:17 EST  11/07/24      "

## 2024-11-28 RX ORDER — FLUCONAZOLE 200 MG/1
200 TABLET ORAL ONCE
Qty: 1 TABLET | Refills: 2 | Status: SHIPPED | OUTPATIENT
Start: 2024-11-28 | End: 2024-11-28

## 2024-11-28 RX ORDER — DIAZEPAM 5 MG/1
5 TABLET ORAL EVERY 8 HOURS PRN
Qty: 1 TABLET | Refills: 0 | Status: SHIPPED | OUTPATIENT
Start: 2024-11-28 | End: 2025-11-28

## 2024-12-04 ENCOUNTER — OFFICE VISIT (OUTPATIENT)
Dept: OBSTETRICS AND GYNECOLOGY | Facility: CLINIC | Age: 34
End: 2024-12-04
Payer: COMMERCIAL

## 2024-12-04 VITALS
HEIGHT: 65 IN | BODY MASS INDEX: 33.15 KG/M2 | SYSTOLIC BLOOD PRESSURE: 138 MMHG | WEIGHT: 199 LBS | DIASTOLIC BLOOD PRESSURE: 82 MMHG

## 2024-12-04 DIAGNOSIS — Z01.419 CERVICAL SMEAR, AS PART OF ROUTINE GYNECOLOGICAL EXAMINATION: ICD-10-CM

## 2024-12-04 DIAGNOSIS — R87.613 HGSIL (HIGH GRADE SQUAMOUS INTRAEPITHELIAL LESION) ON PAP SMEAR OF CERVIX: Primary | ICD-10-CM

## 2024-12-04 DIAGNOSIS — Z13.89 SCREENING FOR GENITOURINARY CONDITION: ICD-10-CM

## 2024-12-04 PROBLEM — O09.899 SHORT INTERVAL BETWEEN PREGNANCIES AFFECTING PREGNANCY, ANTEPARTUM: Status: RESOLVED | Noted: 2024-01-15 | Resolved: 2024-12-04

## 2024-12-04 PROBLEM — O36.63X0 MACROSOMIA AFFECTING MANAGEMENT OF MOTHER IN THIRD TRIMESTER: Status: RESOLVED | Noted: 2024-08-07 | Resolved: 2024-12-04

## 2024-12-04 PROBLEM — Z34.90 PREGNANCY: Status: RESOLVED | Noted: 2024-05-06 | Resolved: 2024-12-04

## 2024-12-04 PROBLEM — O36.60X0 EXCESSIVE FETAL GROWTH AFFECTING MANAGEMENT OF PREGNANCY, ANTEPARTUM: Status: RESOLVED | Noted: 2024-05-29 | Resolved: 2024-12-04

## 2024-12-04 PROBLEM — R87.612 LGSIL ON PAP SMEAR OF CERVIX: Status: ACTIVE | Noted: 2022-10-26

## 2024-12-04 NOTE — PROGRESS NOTES
"Colposcopy Procedure Note    /82   Ht 165.1 cm (65\")   Wt 90.3 kg (199 lb)   Breastfeeding Yes   BMI 33.12 kg/m²     Indications: Most recent Pap smear showed: low-grade squamous intraepithelial neoplasia (LGSIL - encompassing HPV,mild dysplasia,KIA I).  Prior cervical/vaginal disease: ASCUS.  Prior cervical treatment: no treatment.    Procedure Details   The risks and benefits of the procedure and Verbal informed consent obtained.    Speculum placed in vagina and excellent visualization of cervix achieved, cervix swabbed x 3 with acetic acid solution and Lugol's solution     Findings:  Physical Exam  Cervix: no visible lesions, no mosaicism, no punctation, and no abnormal vasculature; SCJ visualized 360 degrees without lesions, no biopsies taken, cervix swabbed with Lugol's solution, and specimen labelled and sent to pathology. Pap done  Vaginal inspection: normal without visible lesions.  Vulvar colposcopy: vulvar colposcopy not performed.      Specimens: pap smear    Complications: none.    PT TOLERATED PROCEDURE WELL.     I saw the patient with a face mask, gloves and eye protection  The patient herself was masked.  Social distancing was observed as appropriate. All COVID precautions observed.     IMPRESSION:  NEGATIVE, ADEQUATE COLPOSCOPY    Plan:  call for pap results.  If ASCUS or less, rpt pap 6 months.  If LGSIL or worse, pt needs LEEP.        Nav Pastrana MD  12:52 EST  12/04/24    "

## 2024-12-11 PROBLEM — B97.7 HPV IN FEMALE: Status: ACTIVE | Noted: 2024-12-11

## 2024-12-11 LAB
CYTOLOGIST CVX/VAG CYTO: ABNORMAL
CYTOLOGY CVX/VAG DOC CYTO: ABNORMAL
CYTOLOGY CVX/VAG DOC THIN PREP: ABNORMAL
DX ICD CODE: ABNORMAL
HPV I/H RISK 4 DNA CVX QL PROBE+SIG AMP: POSITIVE
HPV16 DNA CVX QL PROBE+SIG AMP: NEGATIVE
HPV18+45 E6+E7 MRNA CVX QL NAA+PROBE: NEGATIVE
Lab: ABNORMAL
Lab: ABNORMAL
OTHER STN SPEC: ABNORMAL
STAT OF ADQ CVX/VAG CYTO-IMP: ABNORMAL

## 2025-03-11 NOTE — PROGRESS NOTES
RM:________    Referral Provider: Referring, Self Provider, No Known    NEW PATIENT/ CONSULT  PREVIOUS CARDIOLOGIST: ______________________________    CARDIAC TESTING: __________________________________________________    : 1990   AGE: 34 y.o.    2025  REASON FOR VISIT/  CC:      WT: ____________ BP: __________L __________R/ HR_______________    ALLERGIES:  Patient has no known allergies.  SMOKING HISTORY  Social History     Tobacco Use    Smoking status: Never     Passive exposure: Never    Smokeless tobacco: Never   Vaping Use    Vaping status: Never Used   Substance Use Topics    Alcohol use: Not Currently     Comment: casual    Drug use: Never          H/O: MI_____   STROKE________   GOUT_____   ANEMIA______     CAROTID________ HIV____ CAD_______ HYPERCHOL _____    H/O: CHF _____   RF____ DM___ HTN_______PVD____THYROID DISEASE_______    PMH: GI ____   HEPATITIS ___ KIDNEY DISEASE ___ LUNG DISEASE _______     SLEEP APNEA ____ BLOOD CLOTS ____ DVT ____ VEIN STRIPPING ___________      STOP BANG _________ (CARDIO ONCOLOGY ONLY)    CANCER _________________________________ CHEMO/ RADIATION__________

## 2025-03-18 ENCOUNTER — OFFICE VISIT (OUTPATIENT)
Dept: CARDIOLOGY | Facility: CLINIC | Age: 35
End: 2025-03-18
Payer: COMMERCIAL

## 2025-03-18 VITALS
HEIGHT: 65 IN | SYSTOLIC BLOOD PRESSURE: 114 MMHG | HEART RATE: 74 BPM | BODY MASS INDEX: 33.62 KG/M2 | DIASTOLIC BLOOD PRESSURE: 78 MMHG | WEIGHT: 201.8 LBS

## 2025-03-18 DIAGNOSIS — R06.83 SNORING: ICD-10-CM

## 2025-03-18 DIAGNOSIS — Z87.59 HISTORY OF GESTATIONAL HYPERTENSION: ICD-10-CM

## 2025-03-18 DIAGNOSIS — R00.2 PALPITATIONS: Primary | ICD-10-CM

## 2025-03-18 DIAGNOSIS — I49.9 IRREGULAR HEART BEAT: ICD-10-CM

## 2025-03-18 PROCEDURE — 93000 ELECTROCARDIOGRAM COMPLETE: CPT | Performed by: INTERNAL MEDICINE

## 2025-03-18 PROCEDURE — 99204 OFFICE O/P NEW MOD 45 MIN: CPT | Performed by: INTERNAL MEDICINE

## 2025-03-18 NOTE — PROGRESS NOTES
"Date of Office Visit: 25  Encounter Provider: Leobardo Diaz MD  Place of Service: [facility] CARDIOLOGY  Patient Name: Suha Raines  :1990    Chief Complaint   Patient presents with    Palpitations   :     HPI:     Ms. Raines is 34 y.o. and presents today for evaluation of palpitations.     She is a healthy young woman. She had gestational hypertension with one of her pregnancies but no pre-eclampsia. Her family history is benign. She does not have diabetes or hyperlipidemia. Her  reports that she snores, and both report that she wakes frequently at night.     She has had episodic palpitations for years. They can be  by long periods. It feels like a flutter sensation in her chest. She has not had sustained tachycardia. She denies LH/syncope. She denies CP/SOA. Her smart watch reports \"inconclusive\" recordings during most of these, but on two occasions, it has reported atrial fibrillation.    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  colpo 24: 10/26/2022    Mild intermittent asthma without complication 2022    Pregnancy 2024    Varicella        Past Surgical History:   Procedure Laterality Date     SECTION N/A 2024    Procedure:  SECTION PRIMARY;  Surgeon: Nav Pastrana MD;  Location: [facility] LABOR DELIVERY;  Service: Obstetrics/Gynecology;  Laterality: N/A;    TONSILLECTOMY      WISDOM TOOTH EXTRACTION         Social History     Socioeconomic History    Marital status:    Tobacco Use    Smoking status: Never     Passive exposure: Never    Smokeless tobacco: Never   Vaping Use    Vaping status: Never Used   Substance and Sexual Activity    " "Alcohol use: Not Currently     Comment: casual    Drug use: Never    Sexual activity: Yes     Partners: Male       Family History   Problem Relation Age of Onset    No Known Problems Mother     No Known Problems Father     Heart disease Maternal Grandfather     Breast cancer Neg Hx     Ovarian cancer Neg Hx     Colon cancer Neg Hx     Uterine cancer Neg Hx        Review of Systems   Cardiovascular:  Positive for palpitations.   All other systems reviewed and are negative.      No Known Allergies      Current Outpatient Medications:     albuterol sulfate  (90 Base) MCG/ACT inhaler, Inhale 2 puffs Every 4 (Four) Hours As Needed for Wheezing., Disp: 6.7 g, Rfl: 0    aspirin 81 MG oral suspension, , Disp: , Rfl:     BIOTIN PO, Take  by mouth., Disp: , Rfl:     docusate sodium (Colace) 100 MG capsule, Take 1 capsule by mouth 2 (Two) Times a Day., Disp: 60 capsule, Rfl: 3    fexofenadine (ALLEGRA) 180 MG tablet, , Disp: , Rfl:     ibuprofen (ADVIL,MOTRIN) 600 MG tablet, Take 1 tablet by mouth Every 6 (Six) Hours., Disp: 30 tablet, Rfl: 0    Magnesium 500 MG capsule, Daily., Disp: , Rfl:     Prenatal Vit-Fe Fumarate-FA (prenatal vitamin 27-0.8) 27-0.8 MG tablet tablet, Take  by mouth Daily., Disp: , Rfl:       Objective:     Vitals:    03/18/25 1407 03/18/25 1413   BP: 114/72 114/78   BP Location: Left arm Right arm   Patient Position: Sitting Sitting   Cuff Size: Large Adult Large Adult   Pulse: 74    Weight: 91.5 kg (201 lb 12.8 oz)    Height: 165.1 cm (65\")      Body mass index is 33.58 kg/m².    Vitals reviewed.   Constitutional:       Appearance: Well-developed and not in distress.   Eyes:      Conjunctiva/sclera: Conjunctivae normal.   HENT:      Head: Normocephalic.      Nose: Nose normal.   Neck:      Thyroid: Thyroid normal.      Vascular: No JVD. JVD normal.      Lymphadenopathy: No cervical adenopathy.   Pulmonary:      Effort: Pulmonary effort is normal.      Breath sounds: Normal breath sounds. "   Cardiovascular:      Normal rate. Regular rhythm.      Murmurs: There is no murmur.   Pulses:     Intact distal pulses.   Edema:     Peripheral edema absent.   Abdominal:      Palpations: Abdomen is soft.      Tenderness: There is no abdominal tenderness.   Musculoskeletal: Normal range of motion.      Cervical back: Normal range of motion. Skin:     General: Skin is warm and dry.   Neurological:      General: No focal deficit present.      Mental Status: Alert and oriented to person, place, and time.      Cranial Nerves: No cranial nerve deficit.   Psychiatric:         Behavior: Behavior normal.         Thought Content: Thought content normal.         Judgment: Judgment normal.           ECG 12 Lead    Date/Time: 3/18/2025 2:16 PM  Performed by: Leobardo Diaz MD    Authorized by: Leobardo Diaz MD  Comparison: compared with previous ECG   Similar to previous ECG  Rhythm comments: sinus arrhythmia  Conduction: conduction normal  ST Segments: ST segments normal  T Waves: T waves normal  QRS axis: normal  Other: no other findings    Clinical impression: normal ECG            Assessment:       Diagnosis Plan   1. Palpitations  Adult Transthoracic Echo Complete W/ Cont if Necessary Per Protocol    Home Sleep Study      2. Irregular heart beat        3. Snoring  Adult Transthoracic Echo Complete W/ Cont if Necessary Per Protocol    Home Sleep Study      4. History of gestational hypertension               Plan:       Mrs Raines has been experiencing intermittent palpitations that sound consistent with benign ectopics. Review of her EKG and some of her watch tracings shows sinus arrhythmia. Sometimes, there is an isolated P wave. Atrial activity is consistently seen in every tracing; I reassured her and her  that she was not having atrial fibrillation. I do want to get an echocardiogram given the ectopy and her history of GHTN, mainly to assess LA volume and LV wall thickness.    I have ordered a home sleep study  given her history of snoring and frequent nighttime waking.    Sincerely,       Leobardo Diaz MD

## 2025-04-02 ENCOUNTER — HOSPITAL ENCOUNTER (OUTPATIENT)
Dept: CARDIOLOGY | Facility: HOSPITAL | Age: 35
Discharge: HOME | End: 2025-04-02
Admitting: INTERNAL MEDICINE
Payer: COMMERCIAL

## 2025-04-02 VITALS
WEIGHT: 201.72 LBS | HEIGHT: 65 IN | DIASTOLIC BLOOD PRESSURE: 82 MMHG | BODY MASS INDEX: 33.61 KG/M2 | SYSTOLIC BLOOD PRESSURE: 130 MMHG

## 2025-04-02 DIAGNOSIS — R00.2 PALPITATIONS: ICD-10-CM

## 2025-04-02 DIAGNOSIS — R06.83 SNORING: ICD-10-CM

## 2025-04-02 LAB
AORTIC ARCH: 2.8 CM
AORTIC DIMENSIONLESS INDEX: 0.67 (DI)
ASCENDING AORTA: 3.6 CM
AV MEAN PRESS GRAD SYS DOP V1V2: 5.5 MMHG
AV VMAX SYS DOP: 166.3 CM/SEC
BH CV ECHO MEAS - ACS: 1.8 CM
BH CV ECHO MEAS - AO MAX PG: 11.1 MMHG
BH CV ECHO MEAS - AO ROOT DIAM: 3.3 CM
BH CV ECHO MEAS - AO V2 VTI: 34.2 CM
BH CV ECHO MEAS - AVA(I,D): 2.7 CM2
BH CV ECHO MEAS - EDV(CUBED): 112.2 ML
BH CV ECHO MEAS - EDV(MOD-SP2): 126 ML
BH CV ECHO MEAS - EDV(MOD-SP4): 137 ML
BH CV ECHO MEAS - EF(MOD-SP2): 65.9 %
BH CV ECHO MEAS - EF(MOD-SP4): 59.1 %
BH CV ECHO MEAS - ESV(CUBED): 38.1 ML
BH CV ECHO MEAS - ESV(MOD-SP2): 43 ML
BH CV ECHO MEAS - ESV(MOD-SP4): 56 ML
BH CV ECHO MEAS - FS: 30.2 %
BH CV ECHO MEAS - IVS/LVPW: 1.22 CM
BH CV ECHO MEAS - IVSD: 1.06 CM
BH CV ECHO MEAS - LAT PEAK E' VEL: 16.6 CM/SEC
BH CV ECHO MEAS - LV DIASTOLIC VOL/BSA (35-75): 69.1 CM2
BH CV ECHO MEAS - LV MASS(C)D: 163.1 GRAMS
BH CV ECHO MEAS - LV MAX PG: 4.5 MMHG
BH CV ECHO MEAS - LV MEAN PG: 2.33 MMHG
BH CV ECHO MEAS - LV SYSTOLIC VOL/BSA (12-30): 28.2 CM2
BH CV ECHO MEAS - LV V1 MAX: 105.7 CM/SEC
BH CV ECHO MEAS - LV V1 VTI: 23.1 CM
BH CV ECHO MEAS - LVIDD: 4.8 CM
BH CV ECHO MEAS - LVIDS: 3.4 CM
BH CV ECHO MEAS - LVOT AREA: 4.1 CM2
BH CV ECHO MEAS - LVOT DIAM: 2.28 CM
BH CV ECHO MEAS - LVPWD: 0.87 CM
BH CV ECHO MEAS - MED PEAK E' VEL: 12.5 CM/SEC
BH CV ECHO MEAS - MV A DUR: 0.12 SEC
BH CV ECHO MEAS - MV A MAX VEL: 62.2 CM/SEC
BH CV ECHO MEAS - MV DEC SLOPE: 351.6 CM/SEC2
BH CV ECHO MEAS - MV DEC TIME: 0.25 SEC
BH CV ECHO MEAS - MV E MAX VEL: 99.8 CM/SEC
BH CV ECHO MEAS - MV E/A: 1.6
BH CV ECHO MEAS - MV MAX PG: 5.5 MMHG
BH CV ECHO MEAS - MV MEAN PG: 1.93 MMHG
BH CV ECHO MEAS - MV P1/2T: 90.7 MSEC
BH CV ECHO MEAS - MV V2 VTI: 31.5 CM
BH CV ECHO MEAS - MVA(P1/2T): 2.43 CM2
BH CV ECHO MEAS - MVA(VTI): 3 CM2
BH CV ECHO MEAS - PA ACC TIME: 0.16 SEC
BH CV ECHO MEAS - PA V2 MAX: 93 CM/SEC
BH CV ECHO MEAS - PULM A REVS DUR: 0.09 SEC
BH CV ECHO MEAS - PULM A REVS VEL: 18.5 CM/SEC
BH CV ECHO MEAS - PULM DIAS VEL: 48.3 CM/SEC
BH CV ECHO MEAS - PULM S/D: 1.22
BH CV ECHO MEAS - PULM SYS VEL: 58.7 CM/SEC
BH CV ECHO MEAS - RV MAX PG: 2.44 MMHG
BH CV ECHO MEAS - RV V1 MAX: 78.1 CM/SEC
BH CV ECHO MEAS - RV V1 VTI: 19.1 CM
BH CV ECHO MEAS - SV(LVOT): 94 ML
BH CV ECHO MEAS - SV(MOD-SP2): 83 ML
BH CV ECHO MEAS - SV(MOD-SP4): 81 ML
BH CV ECHO MEAS - SVI(LVOT): 47.4 ML/M2
BH CV ECHO MEAS - SVI(MOD-SP2): 41.9 ML/M2
BH CV ECHO MEAS - SVI(MOD-SP4): 40.9 ML/M2
BH CV ECHO MEAS - TAPSE (>1.6): 2.17 CM
BH CV ECHO MEASUREMENTS AVERAGE E/E' RATIO: 6.86
BH CV XLRA - RV BASE: 3.8 CM
BH CV XLRA - RV LENGTH: 8.2 CM
BH CV XLRA - RV MID: 2.8 CM
BH CV XLRA - TDI S': 14 CM/SEC
LEFT ATRIUM VOLUME INDEX: 24.5 ML/M2
LV EF BIPLANE MOD: 64.2 %
SINUS: 2.9 CM
STJ: 2.9 CM

## 2025-04-02 PROCEDURE — 93306 TTE W/DOPPLER COMPLETE: CPT

## 2025-04-30 ENCOUNTER — HOSPITAL ENCOUNTER (OUTPATIENT)
Dept: SLEEP MEDICINE | Facility: HOSPITAL | Age: 35
Discharge: HOME | End: 2025-04-30
Admitting: INTERNAL MEDICINE
Payer: COMMERCIAL

## 2025-04-30 DIAGNOSIS — R06.83 SNORING: ICD-10-CM

## 2025-04-30 DIAGNOSIS — R00.2 PALPITATIONS: ICD-10-CM

## 2025-04-30 PROCEDURE — G0399 HOME SLEEP TEST/TYPE 3 PORTA: HCPCS

## 2025-05-02 DIAGNOSIS — E66.811 CLASS 1 DRUG-INDUCED OBESITY WITH BODY MASS INDEX (BMI) OF 33.0 TO 33.9 IN ADULT, UNSPECIFIED WHETHER SERIOUS COMORBIDITY PRESENT: ICD-10-CM

## 2025-05-02 DIAGNOSIS — G47.33 OSA (OBSTRUCTIVE SLEEP APNEA): Primary | ICD-10-CM

## 2025-05-02 DIAGNOSIS — E66.09 CLASS 1 OBESITY DUE TO EXCESS CALORIES WITH BODY MASS INDEX (BMI) OF 33.0 TO 33.9 IN ADULT, UNSPECIFIED WHETHER SERIOUS COMORBIDITY PRESENT: ICD-10-CM

## 2025-05-02 DIAGNOSIS — E66.1 CLASS 1 DRUG-INDUCED OBESITY WITH BODY MASS INDEX (BMI) OF 33.0 TO 33.9 IN ADULT, UNSPECIFIED WHETHER SERIOUS COMORBIDITY PRESENT: ICD-10-CM

## 2025-05-02 DIAGNOSIS — E66.811 CLASS 1 OBESITY DUE TO EXCESS CALORIES WITH BODY MASS INDEX (BMI) OF 33.0 TO 33.9 IN ADULT, UNSPECIFIED WHETHER SERIOUS COMORBIDITY PRESENT: ICD-10-CM

## 2025-05-05 ENCOUNTER — TELEPHONE (OUTPATIENT)
Dept: SLEEP MEDICINE | Facility: HOSPITAL | Age: 35
End: 2025-05-05
Payer: COMMERCIAL

## 2025-05-05 NOTE — TELEPHONE ENCOUNTER
Pt was called and given sleep study results over the phone.  Pt stated that she would like to set a appt with Dr. Tinajero to go over results and therapy options other than Cpap

## 2025-05-07 ENCOUNTER — TELEPHONE (OUTPATIENT)
Dept: OBSTETRICS AND GYNECOLOGY | Facility: CLINIC | Age: 35
End: 2025-05-07

## 2025-05-07 NOTE — TELEPHONE ENCOUNTER
Caller: Suha Raines  Female, 34 y.o., 1990  MRN: [id number]  CSN: [id number]  Phone: [phone]    Relationship to patient: SELF            Type of visit: NEW OB APPT    PT CALLED TO MAKE NEW OB APPT, UNKNOWN LMP, PT REQ A CALL BACK TO [facility] APPT

## 2025-05-12 ENCOUNTER — OFFICE VISIT (OUTPATIENT)
Dept: OBSTETRICS AND GYNECOLOGY | Facility: CLINIC | Age: 35
End: 2025-05-12
Payer: COMMERCIAL

## 2025-05-12 VITALS
BODY MASS INDEX: 33.22 KG/M2 | DIASTOLIC BLOOD PRESSURE: 86 MMHG | HEIGHT: 65 IN | SYSTOLIC BLOOD PRESSURE: 134 MMHG | WEIGHT: 199.4 LBS

## 2025-05-12 DIAGNOSIS — O09.899 SHORT INTERVAL BETWEEN PREGNANCIES AFFECTING PREGNANCY, ANTEPARTUM: ICD-10-CM

## 2025-05-12 DIAGNOSIS — Z87.59 HISTORY OF GESTATIONAL HYPERTENSION: ICD-10-CM

## 2025-05-12 DIAGNOSIS — N92.6 MISSED MENSES: Primary | ICD-10-CM

## 2025-05-12 DIAGNOSIS — O21.9 NAUSEA AND VOMITING DURING PREGNANCY PRIOR TO 22 WEEKS GESTATION: ICD-10-CM

## 2025-05-12 DIAGNOSIS — O09.293 HISTORY OF MACROSOMIA IN INFANT IN PRIOR PREGNANCY, CURRENTLY PREGNANT IN THIRD TRIMESTER: ICD-10-CM

## 2025-05-12 DIAGNOSIS — Z98.891 HISTORY OF CESAREAN SECTION, LOW TRANSVERSE: ICD-10-CM

## 2025-05-12 LAB
B-HCG UR QL: POSITIVE
BILIRUB BLD-MCNC: NEGATIVE MG/DL
CLARITY, POC: CLEAR
COLOR UR: YELLOW
EXPIRATION DATE: ABNORMAL
GLUCOSE UR STRIP-MCNC: NEGATIVE MG/DL
HCG INTACT+B SERPL-ACNC: NORMAL MIU/ML
INTERNAL NEGATIVE CONTROL: NEGATIVE
INTERNAL POSITIVE CONTROL: POSITIVE
KETONES UR QL: NEGATIVE
LEUKOCYTE EST, POC: NEGATIVE
Lab: 55
NITRITE UR-MCNC: NEGATIVE MG/ML
PH UR: 5 [PH] (ref 5–8)
PROT UR STRIP-MCNC: NEGATIVE MG/DL
RBC # UR STRIP: NEGATIVE /UL
SP GR UR: 1 (ref 1–1.03)
UROBILINOGEN UR QL: NORMAL

## 2025-05-12 RX ORDER — ONDANSETRON 4 MG/1
4 TABLET, ORALLY DISINTEGRATING ORAL EVERY 8 HOURS PRN
Qty: 30 TABLET | Refills: 2 | Status: SHIPPED | OUTPATIENT
Start: 2025-05-12

## 2025-05-12 NOTE — PROGRESS NOTES
Confirmation of pregnancy     Chief Complaint   Patient presents with    Confirmation     25-lmp          Suha Raines is being seen today for evaluation of absence of menses. Due to her period being late, she tested for pregnancy and had + home UPT. She is a 34 y.o. . This problem is new to me, the examiner.       OB History          3    Para   2    Term   2       0    AB   0    Living   2         SAB   0    IAB   0    Ectopic   0    Molar   0    Multiple   0    Live Births   2          Obstetric Comments    x 1- 8#13oz  C/S x 1 for macrosomia- 10#               HPI     LNMP: -  Confident with date: Yes  Taking prenatal vitamins: Yes. Needs RX: No  Planned pregnancy: No- just stopped breastfeeding  Prior obstetric issues, potential pregnancy concerns:  x 1, C/S x 1  Family history of genetic issues (includes FOB): no; FOB adopted  Varicella Hx: yes  Flu vaccine: no  COVID 19 vaccine: yes. Booster vaccine: no  History of STDs: HSV1- no genital lesions  Current medications: PNV, Allegra, ASA, colace  Last pap smear: 2024  Smoker: No  Drug or alcohol abuse: No  H/O physical, emotional or sexual abuse: no  H/O mental health disorder: no  Prior testing for Cystic Fibrosis Carrier or Sickle Cell Trait- yes, CF/FX/SMA neg  Prepregnancy BMI - Body mass index is 33.18 kg/m².    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  colpo 24: 10/26/2022    Mild intermittent asthma without complication 2022    Pregnancy 2024    Varicella        Past Surgical History:   Procedure Laterality Date     SECTION N/A 2024    Procedure:  SECTION PRIMARY;  Surgeon: Nav Pastrana MD;   "Location:  [facility];  Service: Obstetrics/Gynecology;  Laterality: N/A;    TONSILLECTOMY      WISDOM TOOTH EXTRACTION           Current Outpatient Medications:     albuterol sulfate  (90 Base) MCG/ACT inhaler, Inhale 2 puffs Every 4 (Four) Hours As Needed for Wheezing., Disp: 6.7 g, Rfl: 0    aspirin 81 MG oral suspension, , Disp: , Rfl:     BIOTIN PO, Take  by mouth., Disp: , Rfl:     docusate sodium (Colace) 100 MG capsule, Take 1 capsule by mouth 2 (Two) Times a Day., Disp: 60 capsule, Rfl: 3    fexofenadine (ALLEGRA) 180 MG tablet, , Disp: , Rfl:     Prenatal Vit-Fe Fumarate-FA (prenatal vitamin 27-0.8) 27-0.8 MG tablet tablet, Take  by mouth Daily., Disp: , Rfl:     ondansetron ODT (ZOFRAN-ODT) 4 MG disintegrating tablet, Place 1 tablet on the tongue Every 8 (Eight) Hours As Needed for Nausea or Vomiting., Disp: 30 tablet, Rfl: 2    No Known Allergies    Social History     Socioeconomic History    Marital status:    Tobacco Use    Smoking status: Never     Passive exposure: Never    Smokeless tobacco: Never   Vaping Use    Vaping status: Never Used   Substance and Sexual Activity    Alcohol use: Not Currently     Comment: casual    Drug use: Never    Sexual activity: Yes     Partners: Male       Family History   Problem Relation Age of Onset    No Known Problems Mother     No Known Problems Father     Heart disease Maternal Grandfather     Breast cancer Neg Hx     Ovarian cancer Neg Hx     Colon cancer Neg Hx     Uterine cancer Neg Hx        Review of systems     Review of Systems   Gastrointestinal:  Positive for nausea and vomiting.   Genitourinary:  Positive for amenorrhea. Negative for vaginal bleeding.       Objective    /86   Ht 165.1 cm (65\")   Wt 90.4 kg (199 lb 6.4 oz)   LMP 03/08/2025   BMI 33.18 kg/m²     Physical Exam  Vitals reviewed.   Constitutional:       General: She is awake. She is not in acute distress.     Appearance: She is not ill-appearing.   Eyes:     "  Conjunctiva/sclera: Conjunctivae normal.   Pulmonary:      Effort: Pulmonary effort is normal. No respiratory distress.   Musculoskeletal:      Cervical back: Neck supple. No rigidity.   Skin:     General: Skin is warm and dry.      Capillary Refill: Capillary refill takes less than 2 seconds.   Neurological:      Mental Status: She is alert and oriented to person, place, and time.   Psychiatric:         Mood and Affect: Mood and affect normal.         Behavior: Behavior normal.         Assessment/Plan      Missed menses: + UPT in office. LNMP March 2025 = ? week EGA with an EDC=?. US confirms IUP with +FCA: No. GS w YS.  US AV.  Discussed US findings with patient and spouse.  Check quant HCG with repeat level in 2 days. Oriented to practice.     Pregnancy: Disc importance of regular prenatal care. Enc PNV daily. Counseled on providers and on call phone. Disc Tylenol products are ok and encouraged no ibuprofen or ASA in pregnancy.  Disc exercise in pregnancy, diet, expected weight gain, etc. Enc no use of tobacco, vaping, drugs, or alcohol during pregnancy. Rev warn s/s of SAB.     Labs: Pt counseled on genetic screening, Quad screen, AFP, and NIPS.     Body mass index is 33.18 kg/m². Obese women with a pre-pregnancy BMI of 30+ should strive to gain approx 11-20 pounds for the entire pregnancy.        Smoker- nonsmoker    6.   COVID19 precautions were reviewed with the patient. Continue to encourage good hand hygiene.  Hand hygeine performed before and after seeing the patient. Also encouraged COVID booster vaccine at 6 month interval from last COVID vaccine. S/p Covid vaccine; no booster.    7.  Flu vaccine. Encouraged the flu vaccine during pregnancy. Discuss normal physiological changes during pregnancy increase the susceptibility of the flu virus and increase the risk of severe illness for the pregnant woman. Disc flu can be harmful to the unborn baby as well. Enc the flu vaccine. Disc with patient that getting  the flu vaccine is the first and most important step in protecting against the flu. Flu vaccines given during pregnancy help protect both the mother and the baby. Getting the flu vaccine during pregnancy also helps protect the  from flu illness for several months after their birth, when then are too young to get vaccinated. Also disc the importance of good hand hygiene and avoiding people who are sick. The patient did not the flu vaccine.     8. CF/FX/SMA neg    9. H/o C/S- due to fetal macrosomia in 2024; repeat at 39wga    10. Short interval between pregnancy- C/S in 2024; check CL 14-16wga    11.  H/o GHTN- takes baby asa daily; check CBC/CMP, P/C ratio at new OB    12.  H/o macrosomia- first pregnancy (8#13oz), second pregnancy (10#); monitor growth 3rd trimester    13. N/V- ERX Zofran    All questions answered.     Counseling was given to patient and spouse  for the following topics: diagnostic results, instructions for management, risk factor reductions, prognosis, patient and family education, impressions, risks and benefits of treatment options, and importance of treatment compliance . Total time of the encounter was 30 minutes and 25 minutes was spent counseling.  This time does not include time spent performing ultrasound.    Encounter Diagnoses   Name Primary?    Missed menses Yes    History of gestational hypertension     History of  section, low transverse     History of macrosomia in infant in prior pregnancy, currently pregnant in third trimester     Short interval between pregnancies affecting pregnancy, antepartum- C/S 2024     Nausea and vomiting during pregnancy prior to 22 weeks gestation        Diagnoses and all orders for this visit:    Missed menses  -     POC Urinalysis Dipstick  -     POC Pregnancy, Urine  -     HCG, B-subunit, Quantitative    History of gestational hypertension    History of  section, low transverse    History of macrosomia in infant in prior  pregnancy, currently pregnant in third trimester    Short interval between pregnancies affecting pregnancy, antepartum- C/S 8/2024    Nausea and vomiting during pregnancy prior to 22 weeks gestation  -     ondansetron ODT (ZOFRAN-ODT) 4 MG disintegrating tablet; Place 1 tablet on the tongue Every 8 (Eight) Hours As Needed for Nausea or Vomiting.        RTO in 2 weeks for US to confirm viability    Rosalina Parker, APRN  5/12/2025  13:45 EDT

## 2025-05-20 DIAGNOSIS — O21.9 NAUSEA AND VOMITING DURING PREGNANCY PRIOR TO 22 WEEKS GESTATION: ICD-10-CM

## 2025-05-20 RX ORDER — ONDANSETRON 4 MG/1
4 TABLET, FILM COATED ORAL EVERY 8 HOURS PRN
Qty: 30 TABLET | Refills: 2 | Status: SHIPPED | OUTPATIENT
Start: 2025-05-20

## 2025-05-28 ENCOUNTER — OFFICE VISIT (OUTPATIENT)
Dept: OBSTETRICS AND GYNECOLOGY | Facility: CLINIC | Age: 35
End: 2025-05-28
Payer: COMMERCIAL

## 2025-05-28 VITALS
DIASTOLIC BLOOD PRESSURE: 82 MMHG | SYSTOLIC BLOOD PRESSURE: 128 MMHG | WEIGHT: 200.25 LBS | HEIGHT: 65 IN | BODY MASS INDEX: 33.36 KG/M2

## 2025-05-28 DIAGNOSIS — O09.293 HISTORY OF MACROSOMIA IN INFANT IN PRIOR PREGNANCY, CURRENTLY PREGNANT IN THIRD TRIMESTER: ICD-10-CM

## 2025-05-28 DIAGNOSIS — Z98.891 HISTORY OF CESAREAN SECTION, LOW TRANSVERSE: ICD-10-CM

## 2025-05-28 DIAGNOSIS — Z87.59 HISTORY OF GESTATIONAL HYPERTENSION: ICD-10-CM

## 2025-05-28 DIAGNOSIS — O09.899 SHORT INTERVAL BETWEEN PREGNANCIES AFFECTING PREGNANCY, ANTEPARTUM: ICD-10-CM

## 2025-05-28 DIAGNOSIS — O36.80X0 PREGNANCY WITH INCONCLUSIVE FETAL VIABILITY, SINGLE OR UNSPECIFIED FETUS: Primary | ICD-10-CM

## 2025-05-28 LAB
B-HCG UR QL: POSITIVE
BILIRUB BLD-MCNC: NEGATIVE MG/DL
CLARITY, POC: CLEAR
COLOR UR: YELLOW
EXPIRATION DATE: ABNORMAL
GLUCOSE UR STRIP-MCNC: NEGATIVE MG/DL
INTERNAL NEGATIVE CONTROL: ABNORMAL
INTERNAL POSITIVE CONTROL: ABNORMAL
KETONES UR QL: NEGATIVE
LEUKOCYTE EST, POC: NEGATIVE
Lab: ABNORMAL
NITRITE UR-MCNC: NEGATIVE MG/ML
PH UR: 5 [PH] (ref 5–8)
PROT UR STRIP-MCNC: ABNORMAL MG/DL
RBC # UR STRIP: NEGATIVE /UL
SP GR UR: 1.01 (ref 1–1.03)
UROBILINOGEN UR QL: ABNORMAL

## 2025-05-28 RX ORDER — MAGNESIUM GLUCONATE 27 MG(500)
500 TABLET ORAL 2 TIMES DAILY
COMMUNITY

## 2025-05-28 NOTE — PROGRESS NOTES
"Subjective     Chief Complaint   Patient presents with    Amenorrhea       Suha Raines is a 34 y.o.  whose LMP is Patient's last menstrual period was 2025 (approximate).. This patient is new to me- no.  She presents with f/u viability    HPI    LMP between -.  US at confirmation showed GS w YS, no FP. Serial quant HCG's increasing. Repeat US today. Having nausea, no vomiting.  Denies VB.        The following portions of the patient's history were reviewed and updated as appropriate:vital signs, allergies, current medications, past medical history, past social history, past surgical history, and problem list      Review of Systems     Review of Systems   Gastrointestinal:  Positive for nausea. Negative for vomiting.   Genitourinary:  Positive for amenorrhea.       Objective      /82   Ht 165.1 cm (65\")   Wt 90.8 kg (200 lb 4 oz)   LMP 2025 (Approximate)   Breastfeeding No   BMI 33.32 kg/m²     Physical Exam    Physical Exam  Vitals reviewed.   Constitutional:       General: She is awake. She is not in acute distress.     Appearance: She is not ill-appearing.   Eyes:      Conjunctiva/sclera: Conjunctivae normal.   Pulmonary:      Effort: Pulmonary effort is normal. No respiratory distress.   Musculoskeletal:      Cervical back: Neck supple. No rigidity.   Skin:     General: Skin is warm and dry.      Capillary Refill: Capillary refill takes less than 2 seconds.   Neurological:      Mental Status: She is alert and oriented to person, place, and time.   Psychiatric:         Mood and Affect: Mood and affect normal.         Behavior: Behavior normal.           Lab Review   Labs: Urine pregnancy test, Urinalysis - with micro     Imaging: Ultrasound - Pelvic Vaginal      Assessment/Plan  Diagnoses and all orders for this visit:    1. Pregnancy with inconclusive fetal viability, single or unspecified fetus (Primary)  -     POC Urinalysis Dipstick  -     POC Pregnancy, Urine    2. " History of  section, low transverse    3. History of gestational hypertension    4. History of macrosomia in infant in prior pregnancy, currently pregnant in third trimester    5. Short interval between pregnancies affecting pregnancy, antepartum- C/S 2024        TVUS today- IUP marco 7w5d with FHR- 164bpm. OV WNL.  New EDC= 2026.  Discussed US findings with patient/spouse.  Compared to US on 2025.     Return in about 3 weeks (around 2025) for New OB exam/labs.      Rosalina Parker, APRN  2025  15:48 EDT

## 2025-06-19 ENCOUNTER — INITIAL PRENATAL (OUTPATIENT)
Dept: OBSTETRICS AND GYNECOLOGY | Facility: CLINIC | Age: 35
End: 2025-06-19
Payer: COMMERCIAL

## 2025-06-19 VITALS — SYSTOLIC BLOOD PRESSURE: 122 MMHG | WEIGHT: 202 LBS | BODY MASS INDEX: 33.61 KG/M2 | DIASTOLIC BLOOD PRESSURE: 84 MMHG

## 2025-06-19 DIAGNOSIS — Z87.59 HISTORY OF GESTATIONAL HYPERTENSION: ICD-10-CM

## 2025-06-19 DIAGNOSIS — O09.899 SHORT INTERVAL BETWEEN PREGNANCIES AFFECTING PREGNANCY, ANTEPARTUM: ICD-10-CM

## 2025-06-19 DIAGNOSIS — Z34.81 PRENATAL CARE, SUBSEQUENT PREGNANCY IN FIRST TRIMESTER: Primary | ICD-10-CM

## 2025-06-19 DIAGNOSIS — Z36.9 ENCOUNTER FOR ANTENATAL SCREENING, UNSPECIFIED: ICD-10-CM

## 2025-06-19 LAB
BILIRUB BLD-MCNC: NEGATIVE MG/DL
CLARITY, POC: CLEAR
COLOR UR: YELLOW
GLUCOSE UR STRIP-MCNC: NEGATIVE MG/DL
KETONES UR QL: ABNORMAL
LEUKOCYTE EST, POC: ABNORMAL
NITRITE UR-MCNC: NEGATIVE MG/ML
PH UR: 5 [PH] (ref 5–8)
PROT UR STRIP-MCNC: ABNORMAL MG/DL
RBC # UR STRIP: NEGATIVE /UL
SP GR UR: 1 (ref 1–1.03)
UROBILINOGEN UR QL: NORMAL

## 2025-06-19 RX ORDER — DIPHENHYDRAMINE HYDROCHLORIDE 25 MG/1
25 CAPSULE ORAL NIGHTLY
Qty: 30 TABLET | Refills: 1 | Status: SHIPPED | OUTPATIENT
Start: 2025-06-19

## 2025-06-19 RX ORDER — DOXYLAMINE SUCCINATE 25 MG/1
25 TABLET ORAL NIGHTLY
Qty: 30 TABLET | Refills: 1 | Status: SHIPPED | OUTPATIENT
Start: 2025-06-19

## 2025-06-19 NOTE — PROGRESS NOTES
Initial ob visit     Chief Complaint   Patient presents with    Initial Prenatal Visit       Suha Raines is being seen today for her first obstetrical visit.  She is a 34 y.o.    10w6d gestation. This problem is new to me: no      OB History          3    Para   2    Term   2       0    AB   0    Living   2         SAB   0    IAB   0    Ectopic   0    Molar   0    Multiple   0    Live Births   2          Obstetric Comments    x 1- 8#13oz  C/S x 1 for macrosomia- 10#               LNMP: -  Confident with date: Yes  Taking prenatal vitamins: Yes. Needs RX: No  Planned pregnancy: No- just stopped breastfeeding  Prior obstetric issues, potential pregnancy concerns:  x 1, C/S x 1  Family history of genetic issues (includes FOB): no; FOB adopted  Varicella Hx: yes  Flu vaccine: no  COVID 19 vaccine: yes. Booster vaccine: no  History of STDs: HSV1- no genital lesions  Current medications: PNV, Allegra, ASA, colace  Last pap smear: 2024  Smoker: No  Drug or alcohol abuse: No  H/O physical, emotional or sexual abuse: no  H/O mental health disorder: no  Prior testing for Cystic Fibrosis Carrier or Sickle Cell Trait- yes, CF/FX/SMA neg  Prepregnancy BMI: Body mass index is 33.61 kg/m².      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  colpo 24: 10/26/2022    Mild intermittent asthma without complication 2022    Pregnancy 2024    Varicella        Past Surgical History:   Procedure Laterality Date     SECTION N/A 2024    Procedure:  SECTION PRIMARY;  Surgeon: Nav Pastrana MD;  Location: [facility];  Service: Obstetrics/Gynecology;  Laterality: N/A;    TONSILLECTOMY       WISDOM TOOTH EXTRACTION           Current Outpatient Medications:     albuterol sulfate  (90 Base) MCG/ACT inhaler, Inhale 2 puffs Every 4 (Four) Hours As Needed for Wheezing., Disp: 6.7 g, Rfl: 0    aspirin 81 MG oral suspension, , Disp: , Rfl:     BIOTIN PO, Take  by mouth., Disp: , Rfl:     docusate sodium (Colace) 100 MG capsule, Take 1 capsule by mouth 2 (Two) Times a Day., Disp: 60 capsule, Rfl: 3    MAGNESIUM PO, Take  by mouth., Disp: , Rfl:     ondansetron (Zofran) 4 MG tablet, Take 1 tablet by mouth Every 8 (Eight) Hours As Needed for Nausea or Vomiting., Disp: 30 tablet, Rfl: 2    Prenatal Vit-Fe Fumarate-FA (prenatal vitamin 27-0.8) 27-0.8 MG tablet tablet, Take  by mouth Daily., Disp: , Rfl:     No Known Allergies    Social History     Socioeconomic History    Marital status:    Tobacco Use    Smoking status: Never     Passive exposure: Never    Smokeless tobacco: Never   Vaping Use    Vaping status: Never Used   Substance and Sexual Activity    Alcohol use: Not Currently     Comment: casual    Drug use: Never    Sexual activity: Yes     Partners: Male       Family History   Problem Relation Age of Onset    No Known Problems Mother     No Known Problems Father     Heart disease Maternal Grandfather     Breast cancer Neg Hx     Ovarian cancer Neg Hx     Colon cancer Neg Hx     Uterine cancer Neg Hx        Review of systems     All other systems reviewed and are negative except for: Gastrointestinal: positive for nausea     Objective    /84   Wt 91.6 kg (202 lb)   LMP 03/08/2025 (Approximate)   BMI 33.61 kg/m²       General Appearance:    Alert, cooperative, in no acute distress, habitus obese    Head:    Not examined   Eyes:           Not examined   Ears:  Not examined       Neck:  No thyroid enlargement or nodules present   Back:     No kyphosis present, no scoliosis present,                       Lungs:     Clear to auscultation,respirations regular, even and                    unlabored    Heart:    Regular rhythm and normal rate, normal S1 and S2, no            murmur, no gallop, no rub, no click   Breast Exam:    Def   Abdomen:     Normal bowel sounds, no masses, no organomegaly, soft        non-tender, non-distended, no guarding, no rebound                 tenderness   Genitalia:    Def     Extremities:   Moves all extremities well, no edema, no cyanosis, no              redness       Skin:   No bleeding, bruising or rash       Neurologic:   Sensation intact, A&O times 3      Assessment/Plan    1) Pregnancy at 10w6d- EDC established 26 and confirmed by US.  EDC by LNMP rejected    2) OB exam: OB exam completed: Yes. New OB bag provided Yes. Pap collected: no.    3) Labs: OB labs collected: Yes Counseled on genetic screening: Yes, she is previously neg CF/SMA/FX. Counseled on Quad screen and AFP: Yes, she is to early for AFP. Counseled on NIPS: Yes, she desires NIPS.      4) Body mass index is 33.61 kg/m². Obese women with a pre-pregnancy BMI of 30+ should strive to gain approx 11-20 pounds for the entire pregnancy.     5)  Prenatal care: Oriented to the office and prenatal care. Encourage prenatal vitamins. Disc Tylenol products are fine, avoid aspirin and ibuprofen; Zika (travel restrictions/ok to use insect repellant); not to change cat litter; food restrictions; exercise;  avoidance of alcohol, tobacco, drugs and saunas/hot tubs.     6) S/P COVID19 vaccine    7) Flu vaccine declined    8) CF/FX/SMA neg     9. H/o C/S- due to fetal macrosomia in 2024 d/t 10# infant;  Pt intersted in .      10. Short interval between pregnancy- C/S in 2024; check CL 14-16wga     11.  H/o GHTN- takes baby asa daily; check CBC/CMP, P/C ratio at new OB     12.  H/o macrosomia- first pregnancy (8#13oz), second pregnancy (10#); monitor growth 3rd trimester     13. N/V- Taking  Zofran PRN. ERX B6 and Unisom. Enc small frequent meals.        All questions answered.     RTO 4 weeks for OB tummy  and US- CL     Noreen Taylor, APRN  6/19/2025  15:35 EDT

## 2025-06-20 LAB
ABO GROUP BLD: NORMAL
ALBUMIN SERPL-MCNC: 4.3 G/DL (ref 3.9–4.9)
ALP SERPL-CCNC: 59 IU/L (ref 44–121)
ALT SERPL-CCNC: 14 IU/L (ref 0–32)
AST SERPL-CCNC: 16 IU/L (ref 0–40)
BASOPHILS # BLD AUTO: 0 X10E3/UL (ref 0–0.2)
BASOPHILS NFR BLD AUTO: 0 %
BILIRUB SERPL-MCNC: 0.5 MG/DL (ref 0–1.2)
BLD GP AB SCN SERPL QL: NEGATIVE
BUN SERPL-MCNC: 10 MG/DL (ref 6–20)
BUN/CREAT SERPL: 14 (ref 9–23)
CALCIUM SERPL-MCNC: 9.2 MG/DL (ref 8.7–10.2)
CHLORIDE SERPL-SCNC: 101 MMOL/L (ref 96–106)
CO2 SERPL-SCNC: 19 MMOL/L (ref 20–29)
CREAT SERPL-MCNC: 0.7 MG/DL (ref 0.57–1)
EGFRCR SERPLBLD CKD-EPI 2021: 117 ML/MIN/1.73
EOSINOPHIL # BLD AUTO: 0.2 X10E3/UL (ref 0–0.4)
EOSINOPHIL NFR BLD AUTO: 2 %
ERYTHROCYTE [DISTWIDTH] IN BLOOD BY AUTOMATED COUNT: 12.8 % (ref 11.7–15.4)
GLOBULIN SER CALC-MCNC: 2.4 G/DL (ref 1.5–4.5)
GLUCOSE SERPL-MCNC: 84 MG/DL (ref 70–99)
HBA1C MFR BLD: 5.1 % (ref 4.8–5.6)
HBV SURFACE AG SERPL QL IA: NEGATIVE
HCT VFR BLD AUTO: 37.7 % (ref 34–46.6)
HCV IGG SERPL QL IA: NON REACTIVE
HGB A MFR BLD ELPH: 97.3 % (ref 96.4–98.8)
HGB A2 MFR BLD ELPH: 2.7 % (ref 1.8–3.2)
HGB BLD-MCNC: 12.5 G/DL (ref 11.1–15.9)
HGB F MFR BLD ELPH: 0 % (ref 0–2)
HGB FRACT BLD-IMP: NORMAL
HGB S MFR BLD ELPH: 0 %
HIV 1+2 AB+HIV1 P24 AG SERPL QL IA: NON REACTIVE
IMM GRANULOCYTES # BLD AUTO: 0 X10E3/UL (ref 0–0.1)
IMM GRANULOCYTES NFR BLD AUTO: 0 %
LYMPHOCYTES # BLD AUTO: 1.9 X10E3/UL (ref 0.7–3.1)
LYMPHOCYTES NFR BLD AUTO: 26 %
MCH RBC QN AUTO: 31.6 PG (ref 26.6–33)
MCHC RBC AUTO-ENTMCNC: 33.2 G/DL (ref 31.5–35.7)
MCV RBC AUTO: 95 FL (ref 79–97)
MONOCYTES # BLD AUTO: 0.6 X10E3/UL (ref 0.1–0.9)
MONOCYTES NFR BLD AUTO: 9 %
NEUTROPHILS # BLD AUTO: 4.6 X10E3/UL (ref 1.4–7)
NEUTROPHILS NFR BLD AUTO: 63 %
OBSERVATION IMP: NORMAL
PLATELET # BLD AUTO: 290 X10E3/UL (ref 150–450)
POTASSIUM SERPL-SCNC: 3.8 MMOL/L (ref 3.5–5.2)
PROT SERPL-MCNC: 6.7 G/DL (ref 6–8.5)
RBC # BLD AUTO: 3.96 X10E6/UL (ref 3.77–5.28)
RH BLD: POSITIVE
RPR SER QL: NON REACTIVE
RUBV IGG SERPL IA-ACNC: 1.95 INDEX
SODIUM SERPL-SCNC: 135 MMOL/L (ref 134–144)
VZV IGG SER QL IA: REACTIVE
WBC # BLD AUTO: 7.3 X10E3/UL (ref 3.4–10.8)

## 2025-06-21 LAB
AMPHETAMINES UR QL SCN: NEGATIVE NG/ML
BACTERIA UR CULT: NO GROWTH
BACTERIA UR CULT: NORMAL
BARBITURATES UR QL SCN: NEGATIVE NG/ML
BENZODIAZ UR QL SCN: NEGATIVE NG/ML
BUPRENORPHINE UR QL: NEGATIVE NG/ML
BZE UR QL SCN: NEGATIVE NG/ML
C TRACH RRNA SPEC QL NAA+PROBE: NEGATIVE
CANNABINOIDS UR QL SCN: NEGATIVE NG/ML
CREAT UR-MCNC: 132.4 MG/DL (ref 20–300)
CREAT UR-MCNC: 145.6 MG/DL
FENTANYL UR-MCNC: NEGATIVE PG/ML
LABORATORY COMMENT REPORT: NORMAL
MEPERIDINE UR QL: NEGATIVE NG/ML
METHADONE UR QL SCN: NEGATIVE NG/ML
N GONORRHOEA RRNA SPEC QL NAA+PROBE: NEGATIVE
OPIATES UR QL SCN: NEGATIVE NG/ML
OXYCODONE+OXYMORPHONE UR QL SCN: NEGATIVE NG/ML
PCP UR QL: NEGATIVE NG/ML
PH UR: 5.5 [PH] (ref 4.5–8.9)
PROPOXYPH UR QL SCN: NEGATIVE NG/ML
PROT UR-MCNC: 9.5 MG/DL
PROT/CREAT UR: 65 MG/G CREAT (ref 0–200)
T VAGINALIS RRNA SPEC QL NAA+PROBE: NEGATIVE
TRAMADOL UR QL SCN: NEGATIVE NG/ML

## 2025-06-24 LAB
CFDNA.FET/CFDNA.TOTAL SFR FETUS: NORMAL %
CITATION REF LAB TEST: NORMAL
FET 13+18+21+X+Y ANEUP PLAS.CFDNA: NEGATIVE
FET CHR 21 TS PLAS.CFDNA QL: NEGATIVE
FET SEX PLAS.CFDNA DOSAGE CFDNA: NORMAL
FET TS 13 RISK PLAS.CFDNA QL: NEGATIVE
FET TS 18 RISK WBC.DNA+CFDNA QL: NEGATIVE
GA EST FROM CONCEPTION DATE: NORMAL D
GESTATIONAL AGE > 9:: YES
LAB DIRECTOR NAME PROVIDER: NORMAL
LAB DIRECTOR NAME PROVIDER: NORMAL
LABORATORY COMMENT REPORT: NORMAL
LIMITATIONS OF THE TEST: NORMAL
NEGATIVE PREDICTIVE VALUE: NORMAL
PERFORMANCE CHARACTERISTICS: NORMAL
POSITIVE PREDICTIVE VALUE: NORMAL
REF LAB TEST METHOD: NORMAL
SERVICE CMNT-IMP: NORMAL
TEST PERFORMANCE INFO SPEC: NORMAL

## 2025-07-17 ENCOUNTER — ROUTINE PRENATAL (OUTPATIENT)
Dept: OBSTETRICS AND GYNECOLOGY | Facility: CLINIC | Age: 35
End: 2025-07-17
Payer: COMMERCIAL

## 2025-07-17 VITALS — BODY MASS INDEX: 34.61 KG/M2 | WEIGHT: 208 LBS | SYSTOLIC BLOOD PRESSURE: 118 MMHG | DIASTOLIC BLOOD PRESSURE: 80 MMHG

## 2025-07-17 DIAGNOSIS — O09.293 HISTORY OF MACROSOMIA IN INFANT IN PRIOR PREGNANCY, CURRENTLY PREGNANT IN THIRD TRIMESTER: ICD-10-CM

## 2025-07-17 DIAGNOSIS — Z36.9 ENCOUNTER FOR ANTENATAL SCREENING, UNSPECIFIED: ICD-10-CM

## 2025-07-17 DIAGNOSIS — Z87.59 HISTORY OF GESTATIONAL HYPERTENSION: ICD-10-CM

## 2025-07-17 DIAGNOSIS — O09.899 SHORT INTERVAL BETWEEN PREGNANCIES AFFECTING PREGNANCY, ANTEPARTUM: ICD-10-CM

## 2025-07-17 DIAGNOSIS — J45.20 MILD INTERMITTENT ASTHMA WITHOUT COMPLICATION: ICD-10-CM

## 2025-07-17 DIAGNOSIS — Z98.891 HISTORY OF CESAREAN SECTION, LOW TRANSVERSE: ICD-10-CM

## 2025-07-17 DIAGNOSIS — Z3A.14 14 WEEKS GESTATION OF PREGNANCY: Primary | ICD-10-CM

## 2025-07-17 LAB
BILIRUB BLD-MCNC: NEGATIVE MG/DL
CLARITY, POC: CLEAR
COLOR UR: YELLOW
GLUCOSE UR STRIP-MCNC: NEGATIVE MG/DL
KETONES UR QL: ABNORMAL
LEUKOCYTE EST, POC: NEGATIVE
NITRITE UR-MCNC: NEGATIVE MG/ML
PH UR: 5 [PH] (ref 5–8)
PROT UR STRIP-MCNC: ABNORMAL MG/DL
RBC # UR STRIP: NEGATIVE /UL
SP GR UR: 1 (ref 1–1.03)
UROBILINOGEN UR QL: NORMAL

## 2025-07-17 NOTE — PROGRESS NOTES
Chief Complaint   Patient presents with    Routine Prenatal Visit       HPI: 34 y.o.  at 14w6d presenting for an OB visit. Overall feeling well today  She denies any fevers, chills, headaches, blurry vision, chest pain, shortness of breath abdominal pain, dysuria, or leg swelling.  She denies any vaginal bleeding, leakage of fluid, contractions, change in fetal movement, or increased vaginal pressure.    Relevant data reviewed:    Last OB US Data (since 2024)       None          Vitals:    25 1529   BP: 118/80   Weight: 94.3 kg (208 lb)     Total weight gain for pregnancy:  3.629 kg (8 lb)    Cx exam:   / /        Review of systems:   Gen: negative  CV:     negative  GI: negative  :   negative  MS:    negative  Neuro: negative  Pul: negative    Physical Exam  Vitals reviewed.   Constitutional:       General: She is not in acute distress.     Appearance: She is not ill-appearing.   Eyes:      Pupils: Pupils are equal, round, and reactive to light.   Pulmonary:      Effort: Pulmonary effort is normal. No respiratory distress.   Chest:      Comments: Deferred  Abdominal:      General: Abdomen is flat. There is no distension.      Palpations: Abdomen is soft.   Genitourinary:     Comments: Deferred  Musculoskeletal:         General: Normal range of motion.   Neurological:      General: No focal deficit present.      Mental Status: She is alert and oriented to person, place, and time. Mental status is at baseline.   Psychiatric:         Mood and Affect: Mood normal.         Behavior: Behavior normal.         A/P  1. Intrauterine pregnancy at 14w6d   - Estimated fetal weight 112 g (45th percentile), DVP 3.2 cm, cervical length 3.6 cm, fetal heart rate 149 bpm, breech presentation, low-lying placenta 1.7 cm away from internal cervical os  - Continue CLs for short interpregnancy interval, re-scan for placental position next visit  - discussed TOLAC vs CS, this is a short interval pregnanacy and we  discussed increased risk of uterine rupture. Patient says this is their last desired pregnancy and we discussed salpingectomy at time of repeat  section, if patient desires. Will continue to discuss delivery plans at pregnancy progresses  2. Pregnancy Risk:  NORMAL    Diagnoses and all orders for this visit:    1. 14 weeks gestation of pregnancy (Primary)    2. History of  section, low transverse    3. History of macrosomia in infant in prior pregnancy, currently pregnant in third trimester    4. History of gestational hypertension    5. Short interval between pregnancies affecting pregnancy, antepartum- C/S 2024    6. Mild intermittent asthma without complication    7. Encounter for  screening, unspecified  -     POC Urinalysis Dipstick        Routine labor warnings were discussed and indications for L & D f/u including bleeding, regular contractions, decreased fetal movement or/and rupture of membranes.   -----------------------  PLAN:   Return in about 4 weeks (around 2025) for OB visit, Anatomy scan, cervical length, AFP.    Kobe Corado MD  2025   16:43 EDT

## 2025-08-14 ENCOUNTER — ROUTINE PRENATAL (OUTPATIENT)
Dept: OBSTETRICS AND GYNECOLOGY | Facility: CLINIC | Age: 35
End: 2025-08-14
Payer: COMMERCIAL

## 2025-08-14 VITALS — SYSTOLIC BLOOD PRESSURE: 124 MMHG | DIASTOLIC BLOOD PRESSURE: 88 MMHG | WEIGHT: 203.6 LBS | BODY MASS INDEX: 33.88 KG/M2

## 2025-08-14 DIAGNOSIS — B00.9 HSV-1 INFECTION: ICD-10-CM

## 2025-08-14 DIAGNOSIS — O09.899 SHORT INTERVAL BETWEEN PREGNANCIES AFFECTING PREGNANCY, ANTEPARTUM: ICD-10-CM

## 2025-08-14 DIAGNOSIS — Z87.59 HISTORY OF GESTATIONAL HYPERTENSION: ICD-10-CM

## 2025-08-14 DIAGNOSIS — Z98.891 HISTORY OF CESAREAN SECTION, LOW TRANSVERSE: ICD-10-CM

## 2025-08-14 DIAGNOSIS — Z36.9 ENCOUNTER FOR ANTENATAL SCREENING, UNSPECIFIED: Primary | ICD-10-CM

## 2025-08-14 DIAGNOSIS — O09.293 HISTORY OF MACROSOMIA IN INFANT IN PRIOR PREGNANCY, CURRENTLY PREGNANT IN THIRD TRIMESTER: ICD-10-CM

## 2025-08-14 DIAGNOSIS — Z3A.18 18 WEEKS GESTATION OF PREGNANCY: ICD-10-CM

## 2025-08-14 PROBLEM — Z34.90 PREGNANCY: Status: ACTIVE | Noted: 2025-08-14

## 2025-08-14 PROBLEM — Z71.85 IMMUNIZATION COUNSELING: Status: ACTIVE | Noted: 2025-08-14

## 2025-08-16 LAB
AFP INTERP SERPL-IMP: NORMAL
AFP INTERP SERPL-IMP: NORMAL
AFP MOM SERPL: 0.71
AFP SERPL QL: NORMAL
AFP SERPL-MCNC: 28.7 NG/ML
AGE AT DELIVERY: 34.3 YR
GA METHOD: NORMAL
GA: 18.9 WEEKS
IDDM PATIENT QL: NO
MULTIPLE PREGNANCY: NO
NEURAL TUBE DEFECT RISK FETUS: NORMAL %
SERVICE CMNT-IMP: NORMAL

## 2025-08-25 RX ORDER — DIPHENHYDRAMINE HYDROCHLORIDE 25 MG/1
25 CAPSULE ORAL NIGHTLY
Qty: 30 TABLET | Refills: 1 | Status: SHIPPED | OUTPATIENT
Start: 2025-08-25

## (undated) DEVICE — GLV SURG SENSICARE W/ALOE PF LF 7.5 STRL

## (undated) DEVICE — APPL CHLORAPREP W/TINT 26ML ORNG

## (undated) DEVICE — HYDROGEL COATED LATEX URINE METER FOLEY TRAY,16 FR/CH (5.3 MM), 5 ML CATHETER PRE-CONNECTED TO 2000 ML DRAINAGE BAG WITH NEEDLE SAMPLING: Brand: DOVER

## (undated) DEVICE — ANTIBACTERIAL UNDYED BRAIDED (POLYGLACTIN 910), SYNTHETIC ABSORBABLE SUTURE: Brand: COATED VICRYL

## (undated) DEVICE — TRY SKINPREP DRYPREP

## (undated) DEVICE — SOL IRR H2O BTL 1000ML STRL

## (undated) DEVICE — SUT GUT CHRM 2/0 CT1 36IN 923H

## (undated) DEVICE — SUT GUT CHRM 0 CTX 36IN 904H BX/36

## (undated) DEVICE — PREP SOL POVIDONE/IODINE BT 4OZ

## (undated) DEVICE — PK C/SECT 40

## (undated) DEVICE — SUCTION CANISTER, 1000CC,SAFELINER: Brand: DEROYAL